# Patient Record
Sex: MALE | Race: WHITE | Employment: OTHER | URBAN - NONMETROPOLITAN AREA
[De-identification: names, ages, dates, MRNs, and addresses within clinical notes are randomized per-mention and may not be internally consistent; named-entity substitution may affect disease eponyms.]

---

## 2017-09-06 LAB
ALBUMIN SERPL-MCNC: 3.9 G/DL (ref 3.5–5.2)
ALP BLD-CCNC: 57 U/L (ref 40–130)
ALT SERPL-CCNC: 16 U/L (ref 5–41)
ANION GAP SERPL CALCULATED.3IONS-SCNC: 16 MMOL/L (ref 7–19)
AST SERPL-CCNC: 16 U/L (ref 5–40)
BASOPHILS ABSOLUTE: 0.1 K/UL (ref 0–0.2)
BASOPHILS RELATIVE PERCENT: 1 % (ref 0–1)
BILIRUB SERPL-MCNC: 0.7 MG/DL (ref 0.2–1.2)
BUN BLDV-MCNC: 16 MG/DL (ref 8–23)
CALCIUM SERPL-MCNC: 9 MG/DL (ref 8.8–10.2)
CHLORIDE BLD-SCNC: 102 MMOL/L (ref 98–111)
CHOLESTEROL, TOTAL: 195 MG/DL (ref 160–199)
CO2: 23 MMOL/L (ref 22–29)
CREAT SERPL-MCNC: 0.8 MG/DL (ref 0.5–1.2)
EOSINOPHILS ABSOLUTE: 0.1 K/UL (ref 0–0.6)
EOSINOPHILS RELATIVE PERCENT: 1.8 % (ref 0–5)
GFR NON-AFRICAN AMERICAN: >60
GLUCOSE BLD-MCNC: 103 MG/DL (ref 74–109)
HCT VFR BLD CALC: 44.2 % (ref 42–52)
HDLC SERPL-MCNC: 42 MG/DL (ref 55–121)
HEMOGLOBIN: 15 G/DL (ref 14–18)
LDL CHOLESTEROL CALCULATED: 122 MG/DL
LYMPHOCYTES ABSOLUTE: 1.7 K/UL (ref 1.1–4.5)
LYMPHOCYTES RELATIVE PERCENT: 34.1 % (ref 20–40)
MCH RBC QN AUTO: 32.2 PG (ref 27–31)
MCHC RBC AUTO-ENTMCNC: 33.9 G/DL (ref 33–37)
MCV RBC AUTO: 94.8 FL (ref 80–94)
MONOCYTES ABSOLUTE: 0.5 K/UL (ref 0–0.9)
MONOCYTES RELATIVE PERCENT: 10.8 % (ref 0–10)
NEUTROPHILS ABSOLUTE: 2.6 K/UL (ref 1.5–7.5)
NEUTROPHILS RELATIVE PERCENT: 51.9 % (ref 50–65)
PDW BLD-RTO: 13.4 % (ref 11.5–14.5)
PLATELET # BLD: 176 K/UL (ref 130–400)
PMV BLD AUTO: 11.2 FL (ref 9.4–12.4)
POTASSIUM SERPL-SCNC: 4 MMOL/L (ref 3.5–5)
PROSTATE SPECIFIC ANTIGEN: <0.01 NG/ML (ref 0–4)
RBC # BLD: 4.66 M/UL (ref 4.7–6.1)
SODIUM BLD-SCNC: 141 MMOL/L (ref 136–145)
TOTAL PROTEIN: 7.2 G/DL (ref 6.6–8.7)
TRIGL SERPL-MCNC: 153 MG/DL (ref 150–199)
WBC # BLD: 5 K/UL (ref 4.8–10.8)

## 2017-09-12 ENCOUNTER — OFFICE VISIT (OUTPATIENT)
Dept: FAMILY MEDICINE CLINIC | Age: 67
End: 2017-09-12
Payer: MEDICARE

## 2017-09-12 VITALS
SYSTOLIC BLOOD PRESSURE: 152 MMHG | RESPIRATION RATE: 18 BRPM | DIASTOLIC BLOOD PRESSURE: 84 MMHG | HEIGHT: 75 IN | OXYGEN SATURATION: 98 % | WEIGHT: 251.8 LBS | HEART RATE: 70 BPM | BODY MASS INDEX: 31.31 KG/M2

## 2017-09-12 DIAGNOSIS — E78.2 MIXED HYPERLIPIDEMIA: Primary | ICD-10-CM

## 2017-09-12 DIAGNOSIS — Z00.00 ADULT GENERAL MEDICAL EXAM: ICD-10-CM

## 2017-09-12 PROCEDURE — 99213 OFFICE O/P EST LOW 20 MIN: CPT | Performed by: CLINICAL NURSE SPECIALIST

## 2017-09-12 ASSESSMENT — ENCOUNTER SYMPTOMS
WHEEZING: 0
VOMITING: 0
DIARRHEA: 0
CONSTIPATION: 0
TROUBLE SWALLOWING: 0
EYE DISCHARGE: 0
ABDOMINAL PAIN: 0
SORE THROAT: 0
CHEST TIGHTNESS: 0
COUGH: 0
EYE PAIN: 0
SHORTNESS OF BREATH: 0
SINUS PRESSURE: 0
NAUSEA: 0
COLOR CHANGE: 0
BACK PAIN: 0

## 2017-09-12 ASSESSMENT — PATIENT HEALTH QUESTIONNAIRE - PHQ9
2. FEELING DOWN, DEPRESSED OR HOPELESS: 0
SUM OF ALL RESPONSES TO PHQ9 QUESTIONS 1 & 2: 0
SUM OF ALL RESPONSES TO PHQ QUESTIONS 1-9: 0
1. LITTLE INTEREST OR PLEASURE IN DOING THINGS: 0

## 2018-01-23 ENCOUNTER — APPOINTMENT (OUTPATIENT)
Dept: GENERAL RADIOLOGY | Facility: HOSPITAL | Age: 68
End: 2018-01-23
Attending: FAMILY MEDICINE

## 2018-01-23 PROCEDURE — 71046 X-RAY EXAM CHEST 2 VIEWS: CPT

## 2018-03-06 DIAGNOSIS — E78.2 MIXED HYPERLIPIDEMIA: ICD-10-CM

## 2018-03-06 LAB
ALBUMIN SERPL-MCNC: 4 G/DL (ref 3.5–5.2)
ALP BLD-CCNC: 55 U/L (ref 40–130)
ALT SERPL-CCNC: 15 U/L (ref 5–41)
ANION GAP SERPL CALCULATED.3IONS-SCNC: 16 MMOL/L (ref 7–19)
AST SERPL-CCNC: 17 U/L (ref 5–40)
BILIRUB SERPL-MCNC: 0.5 MG/DL (ref 0.2–1.2)
BUN BLDV-MCNC: 13 MG/DL (ref 8–23)
CALCIUM SERPL-MCNC: 9 MG/DL (ref 8.8–10.2)
CHLORIDE BLD-SCNC: 101 MMOL/L (ref 98–111)
CHOLESTEROL, TOTAL: 214 MG/DL (ref 160–199)
CO2: 24 MMOL/L (ref 22–29)
CREAT SERPL-MCNC: 0.9 MG/DL (ref 0.5–1.2)
GFR NON-AFRICAN AMERICAN: >60
GLUCOSE BLD-MCNC: 109 MG/DL (ref 74–109)
HCT VFR BLD CALC: 47 % (ref 42–52)
HDLC SERPL-MCNC: 47 MG/DL (ref 55–121)
HEMOGLOBIN: 15.8 G/DL (ref 14–18)
LDL CHOLESTEROL CALCULATED: 137 MG/DL
MCH RBC QN AUTO: 31.1 PG (ref 27–31)
MCHC RBC AUTO-ENTMCNC: 33.6 G/DL (ref 33–37)
MCV RBC AUTO: 92.5 FL (ref 80–94)
PDW BLD-RTO: 13.4 % (ref 11.5–14.5)
PLATELET # BLD: 188 K/UL (ref 130–400)
PMV BLD AUTO: 11.6 FL (ref 9.4–12.4)
POTASSIUM SERPL-SCNC: 4.2 MMOL/L (ref 3.5–5)
RBC # BLD: 5.08 M/UL (ref 4.7–6.1)
SODIUM BLD-SCNC: 141 MMOL/L (ref 136–145)
TOTAL PROTEIN: 7 G/DL (ref 6.6–8.7)
TRIGL SERPL-MCNC: 150 MG/DL (ref 0–149)
WBC # BLD: 5.6 K/UL (ref 4.8–10.8)

## 2018-03-13 ENCOUNTER — OFFICE VISIT (OUTPATIENT)
Dept: FAMILY MEDICINE CLINIC | Age: 68
End: 2018-03-13
Payer: MEDICARE

## 2018-03-13 VITALS
TEMPERATURE: 97.7 F | BODY MASS INDEX: 31.71 KG/M2 | DIASTOLIC BLOOD PRESSURE: 96 MMHG | HEART RATE: 82 BPM | SYSTOLIC BLOOD PRESSURE: 158 MMHG | OXYGEN SATURATION: 98 % | HEIGHT: 75 IN | WEIGHT: 255 LBS

## 2018-03-13 DIAGNOSIS — Z23 NEED FOR TDAP VACCINATION: ICD-10-CM

## 2018-03-13 DIAGNOSIS — I10 ESSENTIAL HYPERTENSION: ICD-10-CM

## 2018-03-13 DIAGNOSIS — E78.2 MIXED HYPERLIPIDEMIA: Primary | ICD-10-CM

## 2018-03-13 DIAGNOSIS — H61.21 IMPACTED CERUMEN OF RIGHT EAR: ICD-10-CM

## 2018-03-13 PROCEDURE — G8482 FLU IMMUNIZE ORDER/ADMIN: HCPCS | Performed by: CLINICAL NURSE SPECIALIST

## 2018-03-13 PROCEDURE — 90471 IMMUNIZATION ADMIN: CPT | Performed by: CLINICAL NURSE SPECIALIST

## 2018-03-13 PROCEDURE — 1036F TOBACCO NON-USER: CPT | Performed by: CLINICAL NURSE SPECIALIST

## 2018-03-13 PROCEDURE — G8427 DOCREV CUR MEDS BY ELIG CLIN: HCPCS | Performed by: CLINICAL NURSE SPECIALIST

## 2018-03-13 PROCEDURE — G8417 CALC BMI ABV UP PARAM F/U: HCPCS | Performed by: CLINICAL NURSE SPECIALIST

## 2018-03-13 PROCEDURE — 4040F PNEUMOC VAC/ADMIN/RCVD: CPT | Performed by: CLINICAL NURSE SPECIALIST

## 2018-03-13 PROCEDURE — 99214 OFFICE O/P EST MOD 30 MIN: CPT | Performed by: CLINICAL NURSE SPECIALIST

## 2018-03-13 PROCEDURE — 1123F ACP DISCUSS/DSCN MKR DOCD: CPT | Performed by: CLINICAL NURSE SPECIALIST

## 2018-03-13 PROCEDURE — 3017F COLORECTAL CA SCREEN DOC REV: CPT | Performed by: CLINICAL NURSE SPECIALIST

## 2018-03-13 PROCEDURE — 90715 TDAP VACCINE 7 YRS/> IM: CPT | Performed by: CLINICAL NURSE SPECIALIST

## 2018-03-13 RX ORDER — LISINOPRIL 10 MG/1
10 TABLET ORAL DAILY
Qty: 90 TABLET | Refills: 3 | Status: SHIPPED | OUTPATIENT
Start: 2018-03-13 | End: 2019-03-03 | Stop reason: SDUPTHER

## 2018-03-13 RX ORDER — ATORVASTATIN CALCIUM 20 MG/1
TABLET, FILM COATED ORAL
Qty: 90 TABLET | Refills: 3 | Status: SHIPPED | OUTPATIENT
Start: 2018-03-13 | End: 2019-03-20 | Stop reason: SDUPTHER

## 2018-03-13 ASSESSMENT — ENCOUNTER SYMPTOMS
TROUBLE SWALLOWING: 0
BACK PAIN: 0
EYE DISCHARGE: 0
WHEEZING: 0
ABDOMINAL PAIN: 0
SHORTNESS OF BREATH: 0
EYE PAIN: 0
CHEST TIGHTNESS: 0
SORE THROAT: 0
DIARRHEA: 0
COLOR CHANGE: 0
COUGH: 0
VOMITING: 0
SINUS PRESSURE: 0
CONSTIPATION: 0
NAUSEA: 0

## 2018-03-13 NOTE — PROGRESS NOTES
SUBJECTIVE:  Best Ahuja is a 75 y.o. who presents today for 6 Month Follow-Up      HPI  Mr Marco Lara presents today for 6 month follow up. He has been doing well since his last visit, no major illness or infection. His only complaint today is buzzing in his ears, worse when room is quiet. No drainage or decreased hearing noted. He has noted over the last 1-2 months. Mixed hyperlipidemia, he admits to not taking statin daily, may only take 3 days per week some weeks. He also admits to eating anything he wants, no real diet restrictions noted. LDL fasting 137  LFTs WNL    Essential hypertension, remains elevated on routine office visits. He reports elevation at dental appointments as well. No prior treatment for high blood pressure. No reports of syncope, headache, vision changes or acute neuro symptoms. No chest pain. We need his last colonoscopy, he is unsure of date or location  Needs updated Tdap    Past Medical History:   Diagnosis Date    Adult general medical exam     Elevated blood pressure reading     Erectile dysfunction     Mixed hyperlipidemia     Osteoarthritis     Prostate cancer ([facility] [address]      Past Surgical History:   Procedure Laterality Date    HIP SURGERY      KNEE SURGERY      PROSTATECTOMY       Family History   Problem Relation Age of Onset    Hypertension Mother     Coronary Art Dis Mother     Coronary Art Dis Father     Mult Sclerosis Sister      Social History   Substance Use Topics    Smoking status: Former Smoker     Years: 3.00    Smokeless tobacco: Former User     Types: Chew    Alcohol use Yes      Comment: social     Current Outpatient Prescriptions   Medication Sig Dispense Refill    lisinopril (PRINIVIL;ZESTRIL) 10 MG tablet Take 1 tablet by mouth daily 90 tablet 3    atorvastatin (LIPITOR) 20 MG tablet TAKE 1 TABLET AT BEDTIME FOR CHOLESTEROL 90 tablet 3     No current facility-administered medications for this visit.       No Known

## 2018-09-06 DIAGNOSIS — E78.2 MIXED HYPERLIPIDEMIA: ICD-10-CM

## 2018-09-06 DIAGNOSIS — I10 ESSENTIAL HYPERTENSION: ICD-10-CM

## 2018-09-06 LAB
ALBUMIN SERPL-MCNC: 4.2 G/DL (ref 3.5–5.2)
ALP BLD-CCNC: 59 U/L (ref 40–130)
ALT SERPL-CCNC: 20 U/L (ref 5–41)
ANION GAP SERPL CALCULATED.3IONS-SCNC: 16 MMOL/L (ref 7–19)
AST SERPL-CCNC: 19 U/L (ref 5–40)
BILIRUB SERPL-MCNC: 0.8 MG/DL (ref 0.2–1.2)
BUN BLDV-MCNC: 18 MG/DL (ref 8–23)
CALCIUM SERPL-MCNC: 9.2 MG/DL (ref 8.8–10.2)
CHLORIDE BLD-SCNC: 102 MMOL/L (ref 98–111)
CHOLESTEROL, TOTAL: 148 MG/DL (ref 160–199)
CO2: 24 MMOL/L (ref 22–29)
CREAT SERPL-MCNC: 0.9 MG/DL (ref 0.5–1.2)
GFR NON-AFRICAN AMERICAN: >60
GLUCOSE BLD-MCNC: 108 MG/DL (ref 74–109)
HCT VFR BLD CALC: 45.2 % (ref 42–52)
HDLC SERPL-MCNC: 44 MG/DL (ref 55–121)
HEMOGLOBIN: 14.9 G/DL (ref 14–18)
LDL CHOLESTEROL CALCULATED: 80 MG/DL
MCH RBC QN AUTO: 31.9 PG (ref 27–31)
MCHC RBC AUTO-ENTMCNC: 33 G/DL (ref 33–37)
MCV RBC AUTO: 96.8 FL (ref 80–94)
PDW BLD-RTO: 13.7 % (ref 11.5–14.5)
PLATELET # BLD: 186 K/UL (ref 130–400)
PMV BLD AUTO: 11.8 FL (ref 9.4–12.4)
POTASSIUM SERPL-SCNC: 3.9 MMOL/L (ref 3.5–5)
RBC # BLD: 4.67 M/UL (ref 4.7–6.1)
SODIUM BLD-SCNC: 142 MMOL/L (ref 136–145)
TOTAL PROTEIN: 7.1 G/DL (ref 6.6–8.7)
TRIGL SERPL-MCNC: 122 MG/DL (ref 0–149)
WBC # BLD: 6.1 K/UL (ref 4.8–10.8)

## 2018-09-13 ENCOUNTER — OFFICE VISIT (OUTPATIENT)
Dept: FAMILY MEDICINE CLINIC | Age: 68
End: 2018-09-13
Payer: MEDICARE

## 2018-09-13 VITALS
HEIGHT: 75 IN | HEART RATE: 74 BPM | WEIGHT: 251 LBS | BODY MASS INDEX: 31.21 KG/M2 | OXYGEN SATURATION: 98 % | TEMPERATURE: 98.3 F | SYSTOLIC BLOOD PRESSURE: 122 MMHG | DIASTOLIC BLOOD PRESSURE: 64 MMHG

## 2018-09-13 DIAGNOSIS — Z12.5 ENCOUNTER FOR SCREENING FOR MALIGNANT NEOPLASM OF PROSTATE: ICD-10-CM

## 2018-09-13 DIAGNOSIS — E78.2 MIXED HYPERLIPIDEMIA: ICD-10-CM

## 2018-09-13 DIAGNOSIS — Z23 NEED FOR INFLUENZA VACCINATION: ICD-10-CM

## 2018-09-13 DIAGNOSIS — C61 PROSTATE CANCER (HCC): ICD-10-CM

## 2018-09-13 DIAGNOSIS — Z00.00 ROUTINE GENERAL MEDICAL EXAMINATION AT A HEALTH CARE FACILITY: Primary | ICD-10-CM

## 2018-09-13 DIAGNOSIS — Z13.6 SCREENING FOR AAA (ABDOMINAL AORTIC ANEURYSM): ICD-10-CM

## 2018-09-13 DIAGNOSIS — I10 ESSENTIAL HYPERTENSION: ICD-10-CM

## 2018-09-13 PROCEDURE — G0438 PPPS, INITIAL VISIT: HCPCS | Performed by: CLINICAL NURSE SPECIALIST

## 2018-09-13 PROCEDURE — G0008 ADMIN INFLUENZA VIRUS VAC: HCPCS | Performed by: CLINICAL NURSE SPECIALIST

## 2018-09-13 PROCEDURE — 90662 IIV NO PRSV INCREASED AG IM: CPT | Performed by: CLINICAL NURSE SPECIALIST

## 2018-09-13 PROCEDURE — 4040F PNEUMOC VAC/ADMIN/RCVD: CPT | Performed by: CLINICAL NURSE SPECIALIST

## 2018-09-13 ASSESSMENT — ENCOUNTER SYMPTOMS
ABDOMINAL PAIN: 0
COLOR CHANGE: 0
EYE PAIN: 0
CHEST TIGHTNESS: 0
DIARRHEA: 0
VOMITING: 0
SHORTNESS OF BREATH: 0
BACK PAIN: 0
SORE THROAT: 0
NAUSEA: 0
WHEEZING: 0
SINUS PRESSURE: 0
EYE DISCHARGE: 0
TROUBLE SWALLOWING: 0
CONSTIPATION: 0
COUGH: 0

## 2018-09-13 ASSESSMENT — LIFESTYLE VARIABLES
HOW OFTEN DO YOU HAVE A DRINK CONTAINING ALCOHOL: 2
AUDIT-C TOTAL SCORE: 5
AUDIT TOTAL SCORE: 5
HOW OFTEN DURING THE LAST YEAR HAVE YOU FOUND THAT YOU WERE NOT ABLE TO STOP DRINKING ONCE YOU HAD STARTED: 0
HOW OFTEN DO YOU HAVE SIX OR MORE DRINKS ON ONE OCCASION: 2
HAS A RELATIVE, FRIEND, DOCTOR, OR ANOTHER HEALTH PROFESSIONAL EXPRESSED CONCERN ABOUT YOUR DRINKING OR SUGGESTED YOU CUT DOWN: 0
HOW OFTEN DURING THE LAST YEAR HAVE YOU FAILED TO DO WHAT WAS NORMALLY EXPECTED FROM YOU BECAUSE OF DRINKING: 0
HOW OFTEN DURING THE LAST YEAR HAVE YOU HAD A FEELING OF GUILT OR REMORSE AFTER DRINKING: 0
HOW OFTEN DURING THE LAST YEAR HAVE YOU BEEN UNABLE TO REMEMBER WHAT HAPPENED THE NIGHT BEFORE BECAUSE YOU HAD BEEN DRINKING: 0
HOW MANY STANDARD DRINKS CONTAINING ALCOHOL DO YOU HAVE ON A TYPICAL DAY: 1
HAVE YOU OR SOMEONE ELSE BEEN INJURED AS A RESULT OF YOUR DRINKING: 0
HOW OFTEN DURING THE LAST YEAR HAVE YOU NEEDED AN ALCOHOLIC DRINK FIRST THING IN THE MORNING TO GET YOURSELF GOING AFTER A NIGHT OF HEAVY DRINKING: 0

## 2018-09-13 ASSESSMENT — ANXIETY QUESTIONNAIRES: GAD7 TOTAL SCORE: 0

## 2018-09-13 ASSESSMENT — PATIENT HEALTH QUESTIONNAIRE - PHQ9
SUM OF ALL RESPONSES TO PHQ QUESTIONS 1-9: 0
SUM OF ALL RESPONSES TO PHQ QUESTIONS 1-9: 0

## 2018-09-13 NOTE — PROGRESS NOTES
Vaccine Information Sheet, \"Influenza - Inactivated\" OR \"Live - Intranasal\"  given to Thea Pallas, or parent/legal guardian of  Thea Pallas and verbalized understanding. Patient responses:    Have you ever had a reaction to a flu vaccine? No  Are you able to eat eggs without adverse effects? Yes  Do you have any current illness? No  Have you ever had Guillian [place] Syndrome? No    Flu vaccine given per order. Please see immunization tab.
INFLUENZA, HIGH DOSE, 65 YRS +, IM, PF, PREFILL SYR, 0.5ML (FLUZONE HD)    4. Mixed hyperlipidemia  Controlled, continue statin therapy daily  - Comprehensive Metabolic Panel; Future  - Lipid Panel; Future    5. Essential hypertension  Controlled, continue lisinopril   - CBC; Future  - Comprehensive Metabolic Panel; Future  - Urinalysis with Microscopic; Future    6. Prostate cancer [facility])  Post prostatectomy  - Psa screening; Future  - Urinalysis with Microscopic; Future    7. Encounter for screening for malignant neoplasm of prostate  - Psa screening; Future         Return in 6 months (on 3/13/2019) for Labs one week prior. Douglas Johnson

## 2018-09-13 NOTE — PATIENT INSTRUCTIONS
Personalized Preventive Plan for Kailash Flores - 9/13/2018  Medicare offers a range of preventive health benefits. Some of the tests and screenings are paid in full while other may be subject to a deductible, co-insurance, and/or copay. Some of these benefits include a comprehensive review of your medical history including lifestyle, illnesses that may run in your family, and various assessments and screenings as appropriate. After reviewing your medical record and screening and assessments performed today your provider may have ordered immunizations, labs, imaging, and/or referrals for you. A list of these orders (if applicable) as well as your Preventive Care list are included within your After Visit Summary for your review. Other Preventive Recommendations:    · A preventive eye exam performed by an eye specialist is recommended every 1-2 years to screen for glaucoma; cataracts, macular degeneration, and other eye disorders. · A preventive dental visit is recommended every 6 months. · Try to get at least 150 minutes of exercise per week or 10,000 steps per day on a pedometer . · Order or download the FREE \"Exercise & Physical Activity: Your Everyday Guide\" from [employer]. Call 9-918.424.4714 or search [employer] online. · You need 0340-8567 mg of calcium and 9927-6504 IU of vitamin D per day. It is possible to meet your calcium requirement with diet alone, but a vitamin D supplement is usually necessary to meet this goal.  · When exposed to the sun, use a sunscreen that protects against both UVA and UVB radiation with an SPF of 30 or greater. Reapply every 2 to 3 hours or after sweating, drying off with a towel, or swimming. · Always wear a seat belt when traveling in a car. Always wear a helmet when riding a bicycle or motorcycle.

## 2019-03-04 DIAGNOSIS — I10 ESSENTIAL HYPERTENSION: ICD-10-CM

## 2019-03-04 RX ORDER — LISINOPRIL 10 MG/1
TABLET ORAL
Qty: 90 TABLET | Refills: 1 | Status: SHIPPED | OUTPATIENT
Start: 2019-03-04 | End: 2019-08-30 | Stop reason: SDUPTHER

## 2019-03-06 DIAGNOSIS — C61 PROSTATE CANCER (HCC): ICD-10-CM

## 2019-03-06 DIAGNOSIS — Z12.5 ENCOUNTER FOR SCREENING FOR MALIGNANT NEOPLASM OF PROSTATE: ICD-10-CM

## 2019-03-06 DIAGNOSIS — I10 ESSENTIAL HYPERTENSION: ICD-10-CM

## 2019-03-06 DIAGNOSIS — Z00.00 ROUTINE GENERAL MEDICAL EXAMINATION AT A HEALTH CARE FACILITY: ICD-10-CM

## 2019-03-06 DIAGNOSIS — E78.2 MIXED HYPERLIPIDEMIA: ICD-10-CM

## 2019-03-06 LAB
ALBUMIN SERPL-MCNC: 4.3 G/DL (ref 3.5–5.2)
ALP BLD-CCNC: 70 U/L (ref 40–130)
ALT SERPL-CCNC: 23 U/L (ref 5–41)
ANION GAP SERPL CALCULATED.3IONS-SCNC: 16 MMOL/L (ref 7–19)
AST SERPL-CCNC: 18 U/L (ref 5–40)
BACTERIA: NEGATIVE /HPF
BILIRUB SERPL-MCNC: 0.7 MG/DL (ref 0.2–1.2)
BILIRUBIN URINE: NEGATIVE
BLOOD, URINE: NEGATIVE
BUN BLDV-MCNC: 18 MG/DL (ref 8–23)
CALCIUM SERPL-MCNC: 9.3 MG/DL (ref 8.8–10.2)
CHLORIDE BLD-SCNC: 101 MMOL/L (ref 98–111)
CHOLESTEROL, TOTAL: 133 MG/DL (ref 160–199)
CLARITY: CLEAR
CO2: 27 MMOL/L (ref 22–29)
COLOR: YELLOW
CREAT SERPL-MCNC: 1 MG/DL (ref 0.5–1.2)
EPITHELIAL CELLS, UA: 0 /HPF (ref 0–5)
GFR NON-AFRICAN AMERICAN: >60
GLUCOSE BLD-MCNC: 110 MG/DL (ref 74–109)
GLUCOSE URINE: NEGATIVE MG/DL
HCT VFR BLD CALC: 48.1 % (ref 42–52)
HDLC SERPL-MCNC: 44 MG/DL (ref 55–121)
HEMOGLOBIN: 15.6 G/DL (ref 14–18)
HYALINE CASTS: 1 /HPF (ref 0–8)
KETONES, URINE: NEGATIVE MG/DL
LDL CHOLESTEROL CALCULATED: 68 MG/DL
LEUKOCYTE ESTERASE, URINE: NEGATIVE
MCH RBC QN AUTO: 30.9 PG (ref 27–31)
MCHC RBC AUTO-ENTMCNC: 32.4 G/DL (ref 33–37)
MCV RBC AUTO: 95.2 FL (ref 80–94)
NITRITE, URINE: NEGATIVE
PDW BLD-RTO: 13.8 % (ref 11.5–14.5)
PH UA: 6 (ref 5–8)
PLATELET # BLD: 206 K/UL (ref 130–400)
PMV BLD AUTO: 11.3 FL (ref 9.4–12.4)
POTASSIUM SERPL-SCNC: 4.4 MMOL/L (ref 3.5–5)
PROSTATE SPECIFIC ANTIGEN: 0.01 NG/ML (ref 0–4)
PROTEIN UA: NEGATIVE MG/DL
RBC # BLD: 5.05 M/UL (ref 4.7–6.1)
RBC UA: 0 /HPF (ref 0–4)
SODIUM BLD-SCNC: 144 MMOL/L (ref 136–145)
SPECIFIC GRAVITY UA: 1.02 (ref 1–1.03)
TOTAL PROTEIN: 7.6 G/DL (ref 6.6–8.7)
TRIGL SERPL-MCNC: 103 MG/DL (ref 0–149)
UROBILINOGEN, URINE: 0.2 E.U./DL
WBC # BLD: 6.5 K/UL (ref 4.8–10.8)
WBC UA: 0 /HPF (ref 0–5)

## 2019-03-13 ENCOUNTER — OFFICE VISIT (OUTPATIENT)
Dept: FAMILY MEDICINE CLINIC | Age: 69
End: 2019-03-13
Payer: MEDICARE

## 2019-03-13 VITALS
TEMPERATURE: 97.7 F | WEIGHT: 249.4 LBS | HEART RATE: 91 BPM | DIASTOLIC BLOOD PRESSURE: 74 MMHG | BODY MASS INDEX: 31.17 KG/M2 | SYSTOLIC BLOOD PRESSURE: 130 MMHG | OXYGEN SATURATION: 98 %

## 2019-03-13 DIAGNOSIS — Z23 NEED FOR SHINGLES VACCINE: ICD-10-CM

## 2019-03-13 DIAGNOSIS — C61 PROSTATE CANCER (HCC): ICD-10-CM

## 2019-03-13 DIAGNOSIS — I10 ESSENTIAL HYPERTENSION: ICD-10-CM

## 2019-03-13 DIAGNOSIS — E78.2 MIXED HYPERLIPIDEMIA: Primary | ICD-10-CM

## 2019-03-13 PROCEDURE — 1123F ACP DISCUSS/DSCN MKR DOCD: CPT | Performed by: CLINICAL NURSE SPECIALIST

## 2019-03-13 PROCEDURE — 99214 OFFICE O/P EST MOD 30 MIN: CPT | Performed by: CLINICAL NURSE SPECIALIST

## 2019-03-13 PROCEDURE — 4040F PNEUMOC VAC/ADMIN/RCVD: CPT | Performed by: CLINICAL NURSE SPECIALIST

## 2019-03-13 PROCEDURE — G8417 CALC BMI ABV UP PARAM F/U: HCPCS | Performed by: CLINICAL NURSE SPECIALIST

## 2019-03-13 PROCEDURE — 3017F COLORECTAL CA SCREEN DOC REV: CPT | Performed by: CLINICAL NURSE SPECIALIST

## 2019-03-13 PROCEDURE — 1036F TOBACCO NON-USER: CPT | Performed by: CLINICAL NURSE SPECIALIST

## 2019-03-13 PROCEDURE — 1101F PT FALLS ASSESS-DOCD LE1/YR: CPT | Performed by: CLINICAL NURSE SPECIALIST

## 2019-03-13 PROCEDURE — G8427 DOCREV CUR MEDS BY ELIG CLIN: HCPCS | Performed by: CLINICAL NURSE SPECIALIST

## 2019-03-13 PROCEDURE — G8482 FLU IMMUNIZE ORDER/ADMIN: HCPCS | Performed by: CLINICAL NURSE SPECIALIST

## 2019-03-13 ASSESSMENT — ENCOUNTER SYMPTOMS
COUGH: 0
SINUS PRESSURE: 0
DIARRHEA: 0
COLOR CHANGE: 0
TROUBLE SWALLOWING: 0
VOMITING: 0
SHORTNESS OF BREATH: 0
EYE DISCHARGE: 0
EYE PAIN: 0
CHEST TIGHTNESS: 0
ABDOMINAL PAIN: 0
CONSTIPATION: 0
BACK PAIN: 0
NAUSEA: 0
WHEEZING: 0
SORE THROAT: 0

## 2019-03-13 ASSESSMENT — PATIENT HEALTH QUESTIONNAIRE - PHQ9
SUM OF ALL RESPONSES TO PHQ QUESTIONS 1-9: 0
SUM OF ALL RESPONSES TO PHQ9 QUESTIONS 1 & 2: 0
1. LITTLE INTEREST OR PLEASURE IN DOING THINGS: 0
2. FEELING DOWN, DEPRESSED OR HOPELESS: 0
SUM OF ALL RESPONSES TO PHQ QUESTIONS 1-9: 0

## 2019-03-20 DIAGNOSIS — E78.2 MIXED HYPERLIPIDEMIA: ICD-10-CM

## 2019-03-20 RX ORDER — ATORVASTATIN CALCIUM 20 MG/1
TABLET, FILM COATED ORAL
Qty: 90 TABLET | Refills: 3 | Status: SHIPPED | OUTPATIENT
Start: 2019-03-20 | End: 2020-03-16

## 2019-03-22 ENCOUNTER — HOSPITAL ENCOUNTER (OUTPATIENT)
Dept: ULTRASOUND IMAGING | Age: 69
Discharge: HOME | End: 2019-03-22
Payer: MEDICARE

## 2019-03-22 DIAGNOSIS — Z13.6 SCREENING FOR AAA (ABDOMINAL AORTIC ANEURYSM): ICD-10-CM

## 2019-03-22 PROCEDURE — 76706 US ABDL AORTA SCREEN AAA: CPT

## 2019-06-10 RX ORDER — CHLORAL HYDRATE 500 MG
CAPSULE ORAL
COMMUNITY

## 2019-06-10 RX ORDER — NAPROXEN SODIUM 220 MG
220 TABLET ORAL 2 TIMES DAILY PRN
COMMUNITY

## 2019-07-15 NOTE — PROGRESS NOTES
Chief Complaint   Patient presents with   • Colon Cancer Screening     colon-8/6/14 diverticulosis       PCP: Aida Stubbs APRN  REFER: No ref. provider found    Subjective     HPI    Dereje Leone is a 69 y.o. male who presents to office for preventative maintenance.  There is not  a personal history of colon polyps.  There is not a history of colon cancer.  He does not have complaints of nausea/vomiting, change in bowels, weight loss, no BRBPR, no melena.  There is not a family history of colon cancer.  There is not a family history of colon polyps.  He last colonoscopy-2014 .  Bowels do move on regular basis.    CScope (Dr Wong) 2009 & 2014- normal     Past Medical History:   Diagnosis Date   • Cancer (CMS/HCC)     prostate     Past Surgical History:   Procedure Laterality Date   • COLONOSCOPY  08/06/2014    diverticosis   • ENDOSCOPY  07/26/2004    esophagitis  gastritis   • HERNIA REPAIR     • HIP SURGERY     • PROSTATE SURGERY     • REPLACEMENT TOTAL KNEE       Outpatient Medications Marked as Taking for the 7/16/19 encounter (Office Visit) with David Thacker APRN   Medication Sig Dispense Refill   • atorvastatin (LIPITOR) 20 MG tablet      • lisinopril (PRINIVIL,ZESTRIL) 10 MG tablet TAKE 1 TABLET DAILY     • naproxen sodium (ALEVE) 220 MG tablet Take 220 mg by mouth 2 (Two) Times a Day As Needed.       No Known Allergies  Social History     Socioeconomic History   • Marital status:      Spouse name: Not on file   • Number of children: Not on file   • Years of education: Not on file   • Highest education level: Not on file   Tobacco Use   • Smoking status: Former Smoker   • Smokeless tobacco: Never Used   Substance and Sexual Activity   • Alcohol use: Yes     Comment: occ   • Drug use: No   • Sexual activity: Defer     Family History   Problem Relation Age of Onset   • Esophageal cancer Mother      Review of Systems   Constitutional: Negative for fatigue, fever and unexpected  weight change.   HENT: Negative for hearing loss, sore throat and voice change.    Eyes: Negative for visual disturbance.   Respiratory: Negative for cough, shortness of breath and wheezing.    Cardiovascular: Negative for chest pain and palpitations.   Gastrointestinal: Negative for abdominal pain, blood in stool and vomiting.   Endocrine: Negative for polydipsia and polyuria.   Genitourinary: Negative for difficulty urinating, dysuria, hematuria and urgency.   Musculoskeletal: Negative for joint swelling and myalgias.   Skin: Negative for color change, rash and wound.   Neurological: Negative for dizziness, tremors, seizures and syncope.   Hematological: Does not bruise/bleed easily.   Psychiatric/Behavioral: Negative for agitation and confusion. The patient is not nervous/anxious.      Objective   Vitals:    07/16/19 1033   BP: 125/74   Pulse: 68   Temp: 95.4 °F (35.2 °C)   SpO2: 98%     Physical Exam   Constitutional: He is oriented to person, place, and time. He appears well-developed and well-nourished. He is cooperative.   HENT:   Head: Normocephalic and atraumatic.   Eyes: Conjunctivae are normal. Pupils are equal, round, and reactive to light. No scleral icterus.   Neck: Normal range of motion. Neck supple. No JVD present. No thyroid mass and no thyromegaly present.   Cardiovascular: Normal rate, regular rhythm and normal heart sounds. Exam reveals no gallop and no friction rub.   No murmur heard.  Pulmonary/Chest: Effort normal and breath sounds normal. No accessory muscle usage. No respiratory distress. He has no wheezes. He has no rales.   Abdominal: Soft. Normal appearance and bowel sounds are normal. He exhibits no distension, no ascites and no mass. There is no hepatosplenomegaly. There is no tenderness. There is no rebound and no guarding.   Musculoskeletal: Normal range of motion. He exhibits no edema or tenderness.     Vascular Status -  His right foot exhibits normal foot vasculature  and no  edema. His left foot exhibits normal foot vasculature  and no edema.  Lymphadenopathy:     He has no cervical adenopathy.   Neurological: He is alert and oriented to person, place, and time. He has normal strength. Gait normal.   Skin: Skin is warm, dry and intact. No rash noted.     Imaging Results (most recent)     None        Body mass index is 31 kg/m².    Assessment/Plan   Dereje was seen today for colon cancer screening.    Diagnoses and all orders for this visit:    Encounter for screening for malignant neoplasm of colon  -     Case Request; Standing  -     Implement Anesthesia Orders Day of Procedure; Standing  -     Obtain Informed Consent; Standing  -     Case Request      COLONOSCOPY WITH ANESTHESIA (N/A)  Educated on miralax prep    Last colon[facility] will only cover one screening colonoscopy every 10 years, this explained to patient, he verbalized understanding     All risks, benefits, alternatives, and indications of colonoscopy procedure have been discussed with the patient. Risks to include perforation of the colon requiring possible surgery or colostomy, risk of bleeding from biopsies or removal of colon tissue, possibility of missing a colon polyp or cancer, or adverse drug reaction.  Benefits to include the diagnosis and management of disease of the colon and rectum. Alternatives to include barium enema, radiographic evaluation, lab testing or no intervention. He verbalizes understanding and agrees.     Patient's Body mass index is 31 kg/m². BMI is above normal parameters. Recommendations include: no follow up.      There are no Patient Instructions on file for this visit.

## 2019-07-16 ENCOUNTER — OFFICE VISIT (OUTPATIENT)
Dept: GASTROENTEROLOGY | Facility: CLINIC | Age: 69
End: 2019-07-16

## 2019-07-16 VITALS
WEIGHT: 248 LBS | HEIGHT: 75 IN | HEART RATE: 68 BPM | DIASTOLIC BLOOD PRESSURE: 74 MMHG | SYSTOLIC BLOOD PRESSURE: 125 MMHG | BODY MASS INDEX: 30.84 KG/M2 | TEMPERATURE: 95.4 F | OXYGEN SATURATION: 98 %

## 2019-07-16 DIAGNOSIS — Z12.11 ENCOUNTER FOR SCREENING FOR MALIGNANT NEOPLASM OF COLON: Primary | ICD-10-CM

## 2019-07-16 PROCEDURE — S0260 H&P FOR SURGERY: HCPCS | Performed by: NURSE PRACTITIONER

## 2019-07-16 RX ORDER — ATORVASTATIN CALCIUM 20 MG/1
TABLET, FILM COATED ORAL
COMMUNITY
Start: 2019-06-19

## 2019-07-16 RX ORDER — LISINOPRIL 10 MG/1
TABLET ORAL
COMMUNITY
Start: 2019-03-04

## 2019-07-17 PROBLEM — Z12.11 ENCOUNTER FOR SCREENING FOR MALIGNANT NEOPLASM OF COLON: Status: ACTIVE | Noted: 2019-07-17

## 2019-08-06 ENCOUNTER — TELEPHONE (OUTPATIENT)
Dept: GASTROENTEROLOGY | Facility: CLINIC | Age: 69
End: 2019-08-06

## 2019-08-06 ENCOUNTER — ANESTHESIA (OUTPATIENT)
Dept: GASTROENTEROLOGY | Facility: HOSPITAL | Age: 69
End: 2019-08-06

## 2019-08-06 ENCOUNTER — ANESTHESIA EVENT (OUTPATIENT)
Dept: GASTROENTEROLOGY | Facility: HOSPITAL | Age: 69
End: 2019-08-06

## 2019-08-06 ENCOUNTER — HOSPITAL ENCOUNTER (OUTPATIENT)
Facility: HOSPITAL | Age: 69
Setting detail: HOSPITAL OUTPATIENT SURGERY
Discharge: HOME | End: 2019-08-06
Attending: INTERNAL MEDICINE | Admitting: INTERNAL MEDICINE

## 2019-08-06 VITALS
WEIGHT: 240 LBS | RESPIRATION RATE: 21 BRPM | BODY MASS INDEX: 29.84 KG/M2 | DIASTOLIC BLOOD PRESSURE: 83 MMHG | OXYGEN SATURATION: 96 % | SYSTOLIC BLOOD PRESSURE: 146 MMHG | HEART RATE: 69 BPM | HEIGHT: 75 IN | TEMPERATURE: 97.4 F

## 2019-08-06 DIAGNOSIS — Z12.11 ENCOUNTER FOR SCREENING FOR MALIGNANT NEOPLASM OF COLON: ICD-10-CM

## 2019-08-06 PROCEDURE — G0121 COLON CA SCRN NOT HI RSK IND: HCPCS | Performed by: INTERNAL MEDICINE

## 2019-08-06 PROCEDURE — 25010000002 PROPOFOL 10 MG/ML EMULSION: Performed by: NURSE ANESTHETIST, CERTIFIED REGISTERED

## 2019-08-06 RX ORDER — SODIUM CHLORIDE 0.9 % (FLUSH) 0.9 %
3 SYRINGE (ML) INJECTION AS NEEDED
Status: DISCONTINUED | OUTPATIENT
Start: 2019-08-06 | End: 2019-08-06 | Stop reason: HOSPADM

## 2019-08-06 RX ORDER — SODIUM CHLORIDE 9 MG/ML
500 INJECTION, SOLUTION INTRAVENOUS CONTINUOUS PRN
Status: DISCONTINUED | OUTPATIENT
Start: 2019-08-06 | End: 2019-08-06 | Stop reason: HOSPADM

## 2019-08-06 RX ORDER — PROPOFOL 10 MG/ML
VIAL (ML) INTRAVENOUS AS NEEDED
Status: DISCONTINUED | OUTPATIENT
Start: 2019-08-06 | End: 2019-08-06 | Stop reason: SURG

## 2019-08-06 RX ORDER — LIDOCAINE HYDROCHLORIDE 20 MG/ML
INJECTION, SOLUTION INFILTRATION; PERINEURAL AS NEEDED
Status: DISCONTINUED | OUTPATIENT
Start: 2019-08-06 | End: 2019-08-06 | Stop reason: SURG

## 2019-08-06 RX ADMIN — PROPOFOL 50 MG: 10 INJECTION, EMULSION INTRAVENOUS at 10:09

## 2019-08-06 RX ADMIN — PROPOFOL 50 MG: 10 INJECTION, EMULSION INTRAVENOUS at 10:05

## 2019-08-06 RX ADMIN — PROPOFOL 50 MG: 10 INJECTION, EMULSION INTRAVENOUS at 10:12

## 2019-08-06 RX ADMIN — SODIUM CHLORIDE 500 ML: 0.9 INJECTION, SOLUTION INTRAVENOUS at 09:03

## 2019-08-06 RX ADMIN — PROPOFOL 50 MG: 10 INJECTION, EMULSION INTRAVENOUS at 10:04

## 2019-08-06 RX ADMIN — PROPOFOL 50 MG: 10 INJECTION, EMULSION INTRAVENOUS at 10:07

## 2019-08-06 RX ADMIN — LIDOCAINE HYDROCHLORIDE 40 MG: 20 INJECTION, SOLUTION INFILTRATION; PERINEURAL at 10:04

## 2019-08-06 NOTE — ANESTHESIA POSTPROCEDURE EVALUATION
Patient: Dereje Leone    Procedure Summary     Date:  08/06/19 Room / Location:  [facility] ENDOSCOPY 4 / [facility] ENDOSCOPY    Anesthesia Start:  1002 Anesthesia Stop:  1016    Procedure:  COLONOSCOPY WITH ANESTHESIA (N/A ) Diagnosis:       Encounter for screening for malignant neoplasm of colon      (Encounter for screening for malignant neoplasm of colon [Z12.11])    Surgeon:  Cornelius Wong DO Provider:  Johnny Granados CRNA    Anesthesia Type:  MAC ASA Status:  2          Anesthesia Type: MAC  Last vitals  BP   146/83 (08/06/19 1017)   Temp   97.4 °F (36.3 °C) (08/06/19 0847)   Pulse   69 (08/06/19 1017)   Resp   21 (08/06/19 1030)     SpO2   96 % (08/06/19 1017)     Post Anesthesia Care and Evaluation    Patient location during evaluation: PHASE II  Patient participation: complete - patient participated  Level of consciousness: awake  Pain score: 0  Pain management: adequate  Airway patency: patent  Anesthetic complications: No anesthetic complications  PONV Status: none  Cardiovascular status: acceptable  Respiratory status: acceptable  Hydration status: acceptable  No anesthesia care post op

## 2019-08-06 NOTE — ANESTHESIA PREPROCEDURE EVALUATION
Anesthesia Evaluation     Patient summary reviewed and Nursing notes reviewed   NPO Solid Status: > 8 hours  NPO Liquid Status: > 2 hours           Airway   Mallampati: II  TM distance: >3 FB  Neck ROM: full  No difficulty expected  Dental      Pulmonary    Cardiovascular   Exercise tolerance: good (4-7 METS)    (+) hypertension, hyperlipidemia,       Neuro/Psych  (-) seizures, TIA, CVA  GI/Hepatic/Renal/Endo    (+) obesity,     (-) liver disease, no renal disease, diabetes    Musculoskeletal     Abdominal    Substance History      OB/GYN          Other      history of cancer (prostate cancer in remission) remission                    Anesthesia Plan    ASA 2     MAC     intravenous induction   Anesthetic plan, all risks, benefits, and alternatives have been provided, discussed and informed consent has been obtained with: patient.

## 2019-08-31 DIAGNOSIS — I10 ESSENTIAL HYPERTENSION: ICD-10-CM

## 2019-09-03 RX ORDER — LISINOPRIL 10 MG/1
TABLET ORAL
Qty: 90 TABLET | Refills: 4 | Status: SHIPPED | OUTPATIENT
Start: 2019-09-03 | End: 2020-11-30

## 2019-10-02 DIAGNOSIS — I10 ESSENTIAL HYPERTENSION: ICD-10-CM

## 2019-10-02 DIAGNOSIS — E78.2 MIXED HYPERLIPIDEMIA: ICD-10-CM

## 2019-10-02 LAB
ALBUMIN SERPL-MCNC: 4.3 G/DL (ref 3.5–5.2)
ALP BLD-CCNC: 70 U/L (ref 40–130)
ALT SERPL-CCNC: 24 U/L (ref 5–41)
ANION GAP SERPL CALCULATED.3IONS-SCNC: 17 MMOL/L (ref 7–19)
AST SERPL-CCNC: 18 U/L (ref 5–40)
BILIRUB SERPL-MCNC: 0.8 MG/DL (ref 0.2–1.2)
BUN BLDV-MCNC: 18 MG/DL (ref 8–23)
CALCIUM SERPL-MCNC: 9.2 MG/DL (ref 8.8–10.2)
CHLORIDE BLD-SCNC: 104 MMOL/L (ref 98–111)
CO2: 22 MMOL/L (ref 22–29)
CREAT SERPL-MCNC: 1 MG/DL (ref 0.5–1.2)
GFR NON-AFRICAN AMERICAN: >60
GLUCOSE BLD-MCNC: 110 MG/DL (ref 74–109)
POTASSIUM SERPL-SCNC: 4.3 MMOL/L (ref 3.5–5)
SODIUM BLD-SCNC: 143 MMOL/L (ref 136–145)
TOTAL PROTEIN: 7.5 G/DL (ref 6.6–8.7)

## 2019-10-09 ENCOUNTER — OFFICE VISIT (OUTPATIENT)
Dept: FAMILY MEDICINE CLINIC | Age: 69
End: 2019-10-09
Payer: MEDICARE

## 2019-10-09 VITALS
OXYGEN SATURATION: 97 % | SYSTOLIC BLOOD PRESSURE: 136 MMHG | TEMPERATURE: 97.7 F | HEART RATE: 79 BPM | DIASTOLIC BLOOD PRESSURE: 70 MMHG | WEIGHT: 249.6 LBS | BODY MASS INDEX: 31.04 KG/M2 | HEIGHT: 75 IN

## 2019-10-09 DIAGNOSIS — I10 ESSENTIAL HYPERTENSION: ICD-10-CM

## 2019-10-09 DIAGNOSIS — Z00.00 ROUTINE GENERAL MEDICAL EXAMINATION AT A HEALTH CARE FACILITY: Primary | ICD-10-CM

## 2019-10-09 DIAGNOSIS — Z23 NEED FOR INFLUENZA VACCINATION: ICD-10-CM

## 2019-10-09 DIAGNOSIS — Z12.5 ENCOUNTER FOR SCREENING FOR MALIGNANT NEOPLASM OF PROSTATE: ICD-10-CM

## 2019-10-09 DIAGNOSIS — E78.2 MIXED HYPERLIPIDEMIA: ICD-10-CM

## 2019-10-09 DIAGNOSIS — Z72.89 OTHER PROBLEMS RELATED TO LIFESTYLE: ICD-10-CM

## 2019-10-09 PROCEDURE — G8482 FLU IMMUNIZE ORDER/ADMIN: HCPCS | Performed by: CLINICAL NURSE SPECIALIST

## 2019-10-09 PROCEDURE — 3017F COLORECTAL CA SCREEN DOC REV: CPT | Performed by: CLINICAL NURSE SPECIALIST

## 2019-10-09 PROCEDURE — G8417 CALC BMI ABV UP PARAM F/U: HCPCS | Performed by: CLINICAL NURSE SPECIALIST

## 2019-10-09 PROCEDURE — G0439 PPPS, SUBSEQ VISIT: HCPCS | Performed by: CLINICAL NURSE SPECIALIST

## 2019-10-09 PROCEDURE — 1123F ACP DISCUSS/DSCN MKR DOCD: CPT | Performed by: CLINICAL NURSE SPECIALIST

## 2019-10-09 PROCEDURE — G8427 DOCREV CUR MEDS BY ELIG CLIN: HCPCS | Performed by: CLINICAL NURSE SPECIALIST

## 2019-10-09 PROCEDURE — 1036F TOBACCO NON-USER: CPT | Performed by: CLINICAL NURSE SPECIALIST

## 2019-10-09 PROCEDURE — 90653 IIV ADJUVANT VACCINE IM: CPT | Performed by: CLINICAL NURSE SPECIALIST

## 2019-10-09 PROCEDURE — 4040F PNEUMOC VAC/ADMIN/RCVD: CPT | Performed by: CLINICAL NURSE SPECIALIST

## 2019-10-09 PROCEDURE — G0008 ADMIN INFLUENZA VIRUS VAC: HCPCS | Performed by: CLINICAL NURSE SPECIALIST

## 2019-10-09 PROCEDURE — 99213 OFFICE O/P EST LOW 20 MIN: CPT | Performed by: CLINICAL NURSE SPECIALIST

## 2019-10-09 ASSESSMENT — ENCOUNTER SYMPTOMS
SINUS PRESSURE: 0
CHEST TIGHTNESS: 0
SHORTNESS OF BREATH: 0
EYE PAIN: 0
DIARRHEA: 0
NAUSEA: 0
BACK PAIN: 0
VOMITING: 0
EYE DISCHARGE: 0
COLOR CHANGE: 0
WHEEZING: 0
ABDOMINAL PAIN: 0
CONSTIPATION: 0
COUGH: 0
SORE THROAT: 0
TROUBLE SWALLOWING: 0

## 2019-10-09 ASSESSMENT — PATIENT HEALTH QUESTIONNAIRE - PHQ9
SUM OF ALL RESPONSES TO PHQ QUESTIONS 1-9: 0
SUM OF ALL RESPONSES TO PHQ QUESTIONS 1-9: 0

## 2019-10-09 ASSESSMENT — LIFESTYLE VARIABLES
HOW OFTEN DO YOU HAVE SIX OR MORE DRINKS ON ONE OCCASION: 1
HOW OFTEN DO YOU HAVE A DRINK CONTAINING ALCOHOL: 3
HOW MANY STANDARD DRINKS CONTAINING ALCOHOL DO YOU HAVE ON A TYPICAL DAY: 1
AUDIT-C TOTAL SCORE: 5

## 2020-10-07 DIAGNOSIS — E78.2 MIXED HYPERLIPIDEMIA: ICD-10-CM

## 2020-10-07 DIAGNOSIS — I10 ESSENTIAL HYPERTENSION: ICD-10-CM

## 2020-10-07 DIAGNOSIS — Z72.89 OTHER PROBLEMS RELATED TO LIFESTYLE: ICD-10-CM

## 2020-10-07 DIAGNOSIS — Z12.5 ENCOUNTER FOR SCREENING FOR MALIGNANT NEOPLASM OF PROSTATE: ICD-10-CM

## 2020-10-07 LAB
ALBUMIN SERPL-MCNC: 4.3 G/DL (ref 3.5–5.2)
ALP BLD-CCNC: 82 U/L (ref 40–130)
ALT SERPL-CCNC: 19 U/L (ref 5–41)
ANION GAP SERPL CALCULATED.3IONS-SCNC: 15 MMOL/L (ref 7–19)
AST SERPL-CCNC: 19 U/L (ref 5–40)
BACTERIA: NEGATIVE /HPF
BASOPHILS ABSOLUTE: 0.1 K/UL (ref 0–0.2)
BASOPHILS RELATIVE PERCENT: 0.9 % (ref 0–1)
BILIRUB SERPL-MCNC: 1.1 MG/DL (ref 0.2–1.2)
BILIRUBIN URINE: NEGATIVE
BLOOD, URINE: NEGATIVE
BUN BLDV-MCNC: 20 MG/DL (ref 8–23)
CALCIUM SERPL-MCNC: 9.2 MG/DL (ref 8.8–10.2)
CHLORIDE BLD-SCNC: 103 MMOL/L (ref 98–111)
CHOLESTEROL, TOTAL: 114 MG/DL (ref 160–199)
CLARITY: CLEAR
CO2: 25 MMOL/L (ref 22–29)
COLOR: ABNORMAL
CREAT SERPL-MCNC: 1 MG/DL (ref 0.5–1.2)
CRYSTALS, UA: ABNORMAL /HPF
EOSINOPHILS ABSOLUTE: 0.2 K/UL (ref 0–0.6)
EOSINOPHILS RELATIVE PERCENT: 2.6 % (ref 0–5)
EPITHELIAL CELLS, UA: 1 /HPF (ref 0–5)
GFR AFRICAN AMERICAN: >59
GFR NON-AFRICAN AMERICAN: >60
GLUCOSE BLD-MCNC: 104 MG/DL (ref 74–109)
GLUCOSE URINE: NEGATIVE MG/DL
HCT VFR BLD CALC: 50.7 % (ref 42–52)
HDLC SERPL-MCNC: 38 MG/DL (ref 55–121)
HEMOGLOBIN: 16.6 G/DL (ref 14–18)
HEPATITIS C ANTIBODY INTERPRETATION: NORMAL
HYALINE CASTS: 4 /HPF (ref 0–8)
IMMATURE GRANULOCYTES #: 0 K/UL
KETONES, URINE: NEGATIVE MG/DL
LDL CHOLESTEROL CALCULATED: 53 MG/DL
LEUKOCYTE ESTERASE, URINE: NEGATIVE
LYMPHOCYTES ABSOLUTE: 2.2 K/UL (ref 1.1–4.5)
LYMPHOCYTES RELATIVE PERCENT: 33.4 % (ref 20–40)
MCH RBC QN AUTO: 31.7 PG (ref 27–31)
MCHC RBC AUTO-ENTMCNC: 32.7 G/DL (ref 33–37)
MCV RBC AUTO: 96.8 FL (ref 80–94)
MONOCYTES ABSOLUTE: 0.7 K/UL (ref 0–0.9)
MONOCYTES RELATIVE PERCENT: 11 % (ref 0–10)
NEUTROPHILS ABSOLUTE: 3.4 K/UL (ref 1.5–7.5)
NEUTROPHILS RELATIVE PERCENT: 51.9 % (ref 50–65)
NITRITE, URINE: NEGATIVE
PDW BLD-RTO: 13.2 % (ref 11.5–14.5)
PH UA: 5 (ref 5–8)
PLATELET # BLD: 200 K/UL (ref 130–400)
PMV BLD AUTO: 11.4 FL (ref 9.4–12.4)
POTASSIUM SERPL-SCNC: 4.7 MMOL/L (ref 3.5–5)
PROSTATE SPECIFIC ANTIGEN: 0.01 NG/ML (ref 0–4)
PROTEIN UA: NEGATIVE MG/DL
RBC # BLD: 5.24 M/UL (ref 4.7–6.1)
RBC UA: 2 /HPF (ref 0–4)
SODIUM BLD-SCNC: 143 MMOL/L (ref 136–145)
SPECIFIC GRAVITY UA: 1.02 (ref 1–1.03)
TOTAL PROTEIN: 7.4 G/DL (ref 6.6–8.7)
TRIGL SERPL-MCNC: 114 MG/DL (ref 0–149)
UROBILINOGEN, URINE: 1 E.U./DL
WBC # BLD: 6.5 K/UL (ref 4.8–10.8)
WBC UA: 1 /HPF (ref 0–5)

## 2020-10-14 ENCOUNTER — HOSPITAL ENCOUNTER (OUTPATIENT)
Dept: CT IMAGING | Age: 70
Discharge: HOME | End: 2020-10-14
Payer: MEDICARE

## 2020-10-14 ENCOUNTER — OFFICE VISIT (OUTPATIENT)
Dept: FAMILY MEDICINE CLINIC | Age: 70
End: 2020-10-14
Payer: MEDICARE

## 2020-10-14 VITALS
WEIGHT: 246 LBS | BODY MASS INDEX: 30.59 KG/M2 | SYSTOLIC BLOOD PRESSURE: 138 MMHG | DIASTOLIC BLOOD PRESSURE: 82 MMHG | OXYGEN SATURATION: 99 % | TEMPERATURE: 97.3 F | HEART RATE: 55 BPM | HEIGHT: 75 IN

## 2020-10-14 PROBLEM — I48.91 ATRIAL FIBRILLATION (HCC): Status: ACTIVE | Noted: 2020-10-14

## 2020-10-14 PROCEDURE — 1036F TOBACCO NON-USER: CPT | Performed by: CLINICAL NURSE SPECIALIST

## 2020-10-14 PROCEDURE — 93000 ELECTROCARDIOGRAM COMPLETE: CPT | Performed by: CLINICAL NURSE SPECIALIST

## 2020-10-14 PROCEDURE — 99214 OFFICE O/P EST MOD 30 MIN: CPT | Performed by: CLINICAL NURSE SPECIALIST

## 2020-10-14 PROCEDURE — G8427 DOCREV CUR MEDS BY ELIG CLIN: HCPCS | Performed by: CLINICAL NURSE SPECIALIST

## 2020-10-14 PROCEDURE — 4040F PNEUMOC VAC/ADMIN/RCVD: CPT | Performed by: CLINICAL NURSE SPECIALIST

## 2020-10-14 PROCEDURE — G8417 CALC BMI ABV UP PARAM F/U: HCPCS | Performed by: CLINICAL NURSE SPECIALIST

## 2020-10-14 PROCEDURE — 3017F COLORECTAL CA SCREEN DOC REV: CPT | Performed by: CLINICAL NURSE SPECIALIST

## 2020-10-14 PROCEDURE — 90694 VACC AIIV4 NO PRSRV 0.5ML IM: CPT | Performed by: CLINICAL NURSE SPECIALIST

## 2020-10-14 PROCEDURE — 70450 CT HEAD/BRAIN W/O DYE: CPT

## 2020-10-14 PROCEDURE — 1123F ACP DISCUSS/DSCN MKR DOCD: CPT | Performed by: CLINICAL NURSE SPECIALIST

## 2020-10-14 PROCEDURE — G0008 ADMIN INFLUENZA VIRUS VAC: HCPCS | Performed by: CLINICAL NURSE SPECIALIST

## 2020-10-14 PROCEDURE — G8484 FLU IMMUNIZE NO ADMIN: HCPCS | Performed by: CLINICAL NURSE SPECIALIST

## 2020-10-14 PROCEDURE — G0439 PPPS, SUBSEQ VISIT: HCPCS | Performed by: CLINICAL NURSE SPECIALIST

## 2020-10-14 ASSESSMENT — ENCOUNTER SYMPTOMS
ABDOMINAL PAIN: 0
WHEEZING: 0
CONSTIPATION: 0
CHEST TIGHTNESS: 0
BACK PAIN: 0
VOMITING: 0
SINUS PRESSURE: 0
NAUSEA: 0
COLOR CHANGE: 0
SORE THROAT: 0
EYE DISCHARGE: 0
COUGH: 0
SHORTNESS OF BREATH: 0
TROUBLE SWALLOWING: 0
EYE PAIN: 0
DIARRHEA: 0

## 2020-10-14 ASSESSMENT — LIFESTYLE VARIABLES
HOW MANY STANDARD DRINKS CONTAINING ALCOHOL DO YOU HAVE ON A TYPICAL DAY: 0
HOW OFTEN DO YOU HAVE A DRINK CONTAINING ALCOHOL: 2
HOW OFTEN DO YOU HAVE SIX OR MORE DRINKS ON ONE OCCASION: 1
AUDIT-C TOTAL SCORE: 3

## 2020-10-14 ASSESSMENT — PATIENT HEALTH QUESTIONNAIRE - PHQ9
SUM OF ALL RESPONSES TO PHQ QUESTIONS 1-9: 0
1. LITTLE INTEREST OR PLEASURE IN DOING THINGS: 0
SUM OF ALL RESPONSES TO PHQ9 QUESTIONS 1 & 2: 0
2. FEELING DOWN, DEPRESSED OR HOPELESS: 0
SUM OF ALL RESPONSES TO PHQ QUESTIONS 1-9: 0

## 2020-10-14 NOTE — PROGRESS NOTES
Vaccine Information Sheet, \"Influenza - Inactivated\" OR \"Live - Intranasal\"  given to Al Larson, or parent/legal guardian of  Al Larson and verbalized understanding. Patient responses:    Have you ever had a reaction to a flu vaccine? No  Are you able to eat eggs without adverse effects? Yes  Do you have any current illness? No  Have you ever had Guillian [place] Syndrome? No    Flu vaccine given per order. Please see immunization tab.

## 2020-10-14 NOTE — PROGRESS NOTES
Medicare Annual Wellness Visit  Name: Micki Ada Date: 10/14/2020   MRN: [id number] Sex: Male   Age: 69 y.o. Ethnicity: Non-/Non    : 1950 Race: Sohail Regan is here for Medicare AWV    Screenings for behavioral, psychosocial and functional/safety risks, and cognitive dysfunction are all negative except as indicated below. These results, as well as other patient data from the [address] form, are documented in Flowsheets linked to this Encounter. HM  Colonoscopy UTD  PSA 0  Immunizations UTD    No Known Allergies      Prior to Visit Medications    Medication Sig Taking?  Authorizing Provider   atorvastatin (LIPITOR) 20 MG tablet Take 1 tablet by mouth daily Yes CHEIKH Ellis   lisinopril (PRINIVIL;ZESTRIL) 10 MG tablet TAKE 1 TABLET DAILY Yes CHEIKH Valdovinos   zoster recombinant adjuvanted vaccine [facility]) 50 MCG/0.5ML SUSR injection Inject 0.5 mLs into the muscle See Admin Instructions 1 dose now and repeat in 2-6 months Yes CHEIKH Acuna         Past Medical History:   Diagnosis Date    Adult general medical exam     Elevated blood pressure reading     Erectile dysfunction     Mixed hyperlipidemia     Osteoarthritis     Prostate cancer [facility])        Past Surgical History:   Procedure Laterality Date    HIP SURGERY      KNEE SURGERY      PROSTATECTOMY           Family History   Problem Relation Age of Onset    Hypertension Mother     Coronary Art Dis Mother     Coronary Art Dis Father     Mult Sclerosis Sister        CareTeam (Including outside providers/suppliers regularly involved in providing care):   Patient Care Team:  CHEIKH Acuna as PCP - General (Family Medicine)  CHEIKH Acuna as PCP - [facility] Empaneled Provider    Wt Readings from Last 3 Encounters:   10/14/20 246 lb (111.6 kg)   10/09/19 249 lb 9.6 oz (113.2 kg)   19 249 lb 6.4 oz (113.1 kg)     Vitals:    10/14/20 6218 Date(s) Administered    Influenza, High Dose (Fluzone 65 yrs and older) 10/13/2017, 09/13/2018    Influenza, Triv, inactivated, subunit, adjuvanted, IM (Fluad 65 yrs and older) 10/09/2019    Pneumococcal Conjugate 13-valent ([id number]) 08/26/2016    Pneumococcal Polysaccharide ([id number]) 07/22/2014    Tdap (Boostrix, Adacel) 03/13/2018    Zoster Recombinant (Shingrix) 10/09/2019, 12/12/2019        Health Maintenance   Topic Date Due    Annual Wellness Visit (AWV)  05/29/2019    Flu vaccine (1) 09/01/2020    Lipid screen  10/07/2021    Potassium monitoring  10/07/2021    Creatinine monitoring  10/07/2021    PSA counseling  10/07/2021    Colon cancer screen colonoscopy  08/06/2024    DTaP/Tdap/Td vaccine (2 - Td) 03/13/2028    Shingles Vaccine  Completed    Pneumococcal 65+ years Vaccine  Completed    AAA screen  Completed    Hepatitis C screen  Completed    Hepatitis A vaccine  Aged Out    Hepatitis B vaccine  Aged Out    Hib vaccine  Aged Out    Meningococcal (ACWY) vaccine  Aged Out     Recommendations for Sporthold Due: see orders and patient instructions/AVS.  . Recommended screening schedule for the next 5-10 years is provided to the patient in written form: see Patient Instructions/AVS.    Dinahcharly Brigitte was seen today for medicare awv.     Diagnoses and all orders for this visit:    Routine general medical examination at a health care facility    Need for influenza vaccination  -     INFLUENZA, QUADV, ADJUVANTED, 65 YRS =, IM, PF, PREFILL SYR, 0.5ML (FLUAD)

## 2020-10-14 NOTE — PROGRESS NOTES
SUBJECTIVE:  Sean Wills is a 69 y.o. who presents today for Medicare AWV      HPI    Mr Chanell Lopez presents today for AWV and routine follow up. Essential hypertension, well controlled. Has been compliant with med regimen, which has not been changed in years. No dizziness or syncope. No swelling. Normal elytes and renal function. Mixed hyperlipidemia, well controlled. LIPID at goal.  Tolerating statin therapy without new myalgias. LFT WNL. Remains active at home, no routine exercise though. He tells me that while on SC 2 weeks ago, he developed soreness to his right thumb. He then developed soreness that extended up right arm with some slight numbness. He now feels slight pain from his right lateral neck into hand. While sleeping he will develop some right hand stiffness and tingling. With use, right hand and arm seems weaker than left. No chest pain. No leg symptoms. He takes aleve for pain. On exam, he has noticeable irregular heart rate but he does not notice.      Past Medical History:   Diagnosis Date    Adult general medical exam     Elevated blood pressure reading     Erectile dysfunction     Mixed hyperlipidemia     Osteoarthritis     Prostate cancer ([facility] [address]      Past Surgical History:   Procedure Laterality Date    HIP SURGERY      KNEE SURGERY      PROSTATECTOMY       Family History   Problem Relation Age of Onset    Hypertension Mother     Coronary Art Dis Mother     Coronary Art Dis Father     Mult Sclerosis Sister      Social History     Tobacco Use    Smoking status: Former Smoker     Packs/day: 0.25     Years: 10.00     Pack years: 2.50     Types: Cigars     Last attempt to quit: 1975     Years since quittin.1    Smokeless tobacco: Former User     Types: Chew   Substance Use Topics    Alcohol use: Yes     Comment: social     Current Outpatient Medications   Medication Sig Dispense Refill    metoprolol tartrate (LOPRESSOR) 25 MG tablet Take 1 tablet by mouth 2 times daily 60 tablet 1    apixaban (ELIQUIS) 5 MG TABS tablet Take 1 tablet by mouth 2 times daily 60 tablet 1    atorvastatin (LIPITOR) 20 MG tablet Take 1 tablet by mouth daily 90 tablet 1    lisinopril (PRINIVIL;ZESTRIL) 10 MG tablet TAKE 1 TABLET DAILY 90 tablet 4    zoster recombinant adjuvanted vaccine (SHINGRIX) 50 MCG/0.5ML SUSR injection Inject 0.5 mLs into the muscle See Admin Instructions 1 dose now and repeat in 2-6 months 1 each 1     No current facility-administered medications for this visit. No Known Allergies    Review of Systems   Constitutional: Negative for appetite change, chills, diaphoresis, fatigue and fever. HENT: Positive for hearing loss. Negative for congestion, ear pain, postnasal drip, sinus pressure, sore throat and trouble swallowing. Eyes: Negative for pain, discharge and visual disturbance. Respiratory: Negative for cough, chest tightness, shortness of breath and wheezing. Cardiovascular: Negative for chest pain, palpitations and leg swelling. Gastrointestinal: Negative for abdominal pain, constipation, diarrhea, nausea and vomiting. Endocrine: Negative for cold intolerance and heat intolerance. Genitourinary: Negative for difficulty urinating, dysuria, flank pain, frequency, hematuria and urgency. Musculoskeletal: Positive for arthralgias. Negative for back pain, joint swelling and neck pain. Skin: Negative for color change and rash. Neurological: Positive for weakness and numbness. Negative for dizziness, syncope, light-headedness and headaches. Hematological: Negative for adenopathy. Does not bruise/bleed easily. Psychiatric/Behavioral: Negative for behavioral problems, confusion, dysphoric mood, sleep disturbance and suicidal ideas. The patient is not nervous/anxious.          OBJECTIVE:  /82   Pulse 55   Temp 97.3 °F (36.3 °C) (Temporal)   Ht 6' 3\" (1.905 m)   Wt 246 lb (111.6 kg)   SpO2 99%   BMI 30.75 kg/m²    Physical Exam  Vitals signs reviewed. Constitutional:       General: He is not in acute distress. Appearance: Normal appearance. He is well-developed. He is not ill-appearing or toxic-appearing. HENT:      Head: Normocephalic and atraumatic. Right Ear: There is impacted cerumen. Left Ear: Tympanic membrane, ear canal and external ear normal.   Eyes:      General:         Right eye: No discharge. Left eye: No discharge. Conjunctiva/sclera: Conjunctivae normal.      Pupils: Pupils are equal, round, and reactive to light. Neck:      Musculoskeletal: Normal range of motion and neck supple. Thyroid: No thyromegaly. Trachea: No tracheal deviation. Cardiovascular:      Rate and Rhythm: Normal rate. Rhythm irregular. Heart sounds: No murmur. Pulmonary:      Effort: Pulmonary effort is normal. No respiratory distress. Breath sounds: Normal breath sounds. No wheezing or rales. Abdominal:      General: Bowel sounds are normal. There is no distension. Palpations: Abdomen is soft. Tenderness: There is no abdominal tenderness. Genitourinary:     Penis: No tenderness. Musculoskeletal: Normal range of motion. General: No tenderness or deformity. Lymphadenopathy:      Cervical: No cervical adenopathy. Skin:     General: Skin is warm and dry. Findings: No erythema or rash. Neurological:      General: No focal deficit present. Mental Status: He is alert and oriented to person, place, and time. Motor: No weakness (no noticeable weakness on exam). Psychiatric:         Mood and Affect: Mood normal.         Behavior: Behavior normal.         Thought Content:  Thought content normal.         Judgment: Judgment normal.         EKG- afib with rate 112    Lab Results   Component Value Date    PSA 0.01 10/07/2020    PSA 0.01 03/06/2019    PSA <0.01 09/06/2017     Lab Results   Component Value Date    CHOL 114 (L) 10/07/2020    CHOL 133 (L) 03/06/2019 CHOL 148 (L) 09/06/2018     Lab Results   Component Value Date    TRIG 114 10/07/2020    TRIG 103 03/06/2019    TRIG 122 09/06/2018     Lab Results   Component Value Date    HDL 38 (L) 10/07/2020    HDL 44 (L) 03/06/2019    HDL 44 (L) 09/06/2018     Lab Results   Component Value Date    LDLCALC 53 10/07/2020    LDLCALC 68 03/06/2019    LDLCALC 80 09/06/2018     No results found for: LABVLDL, VLDL  No results found for: CHOLHDLRATIO  Lab Results   Component Value Date     10/07/2020    K 4.7 10/07/2020     10/07/2020    CO2 25 10/07/2020    BUN 20 10/07/2020    CREATININE 1.0 10/07/2020    GLUCOSE 104 10/07/2020    CALCIUM 9.2 10/07/2020    PROT 7.4 10/07/2020    LABALBU 4.3 10/07/2020    BILITOT 1.1 10/07/2020    ALKPHOS 82 10/07/2020    AST 19 10/07/2020    ALT 19 10/07/2020    LABGLOM >60 10/07/2020    GFRAA >59 10/07/2020       Lab Results   Component Value Date    WBC 6.5 10/07/2020    HGB 16.6 10/07/2020    HCT 50.7 10/07/2020    MCV 96.8 (H) 10/07/2020     10/07/2020     Hep C antibody- negative    CT HEAD WO CONTRAST  Narrative: EXAMINATION: CT HEAD WO CONTRAST 10/14/2020 10:32 AM  HISTORY: Right arm weakness  Comparison: None  Technique: Sequential imaging was performed from the vertex through  the base of the skull without the use of IV contrast. Sagittal and  coronal reformations were made from the original source data and  reviewed. Automated exposure control was utilized for radiation dose  reduction. Radiation dose:  mGy-cm  Findings: There is diffuse cerebral atrophy. There is no evidence of acute  intracranial hemorrhage or midline shift. There is no evidence of  acute territorial infarct. There are periventricular and subcortical  white matter changes, a nonspecific finding most often seen as sequela  of chronic microvascular ischemia. The ventricles appear normal in  configuration. The basilar cisterns are patent.  Posterior fossa  appears grossly normal. The calvarium

## 2020-10-23 ENCOUNTER — TELEPHONE (OUTPATIENT)
Dept: CARDIOLOGY | Age: 70
End: 2020-10-23

## 2020-10-29 ENCOUNTER — OFFICE VISIT (OUTPATIENT)
Dept: CARDIOLOGY | Age: 70
End: 2020-10-29
Payer: MEDICARE

## 2020-10-29 VITALS
SYSTOLIC BLOOD PRESSURE: 124 MMHG | DIASTOLIC BLOOD PRESSURE: 76 MMHG | BODY MASS INDEX: 30.71 KG/M2 | WEIGHT: 247 LBS | HEART RATE: 84 BPM | HEIGHT: 75 IN | OXYGEN SATURATION: 96 %

## 2020-10-29 PROCEDURE — G8417 CALC BMI ABV UP PARAM F/U: HCPCS | Performed by: NURSE PRACTITIONER

## 2020-10-29 PROCEDURE — 93000 ELECTROCARDIOGRAM COMPLETE: CPT | Performed by: NURSE PRACTITIONER

## 2020-10-29 PROCEDURE — G8484 FLU IMMUNIZE NO ADMIN: HCPCS | Performed by: NURSE PRACTITIONER

## 2020-10-29 PROCEDURE — 1123F ACP DISCUSS/DSCN MKR DOCD: CPT | Performed by: NURSE PRACTITIONER

## 2020-10-29 PROCEDURE — 3017F COLORECTAL CA SCREEN DOC REV: CPT | Performed by: NURSE PRACTITIONER

## 2020-10-29 PROCEDURE — 99204 OFFICE O/P NEW MOD 45 MIN: CPT | Performed by: NURSE PRACTITIONER

## 2020-10-29 PROCEDURE — 4040F PNEUMOC VAC/ADMIN/RCVD: CPT | Performed by: NURSE PRACTITIONER

## 2020-10-29 PROCEDURE — G8427 DOCREV CUR MEDS BY ELIG CLIN: HCPCS | Performed by: NURSE PRACTITIONER

## 2020-10-29 PROCEDURE — 1036F TOBACCO NON-USER: CPT | Performed by: NURSE PRACTITIONER

## 2020-10-29 ASSESSMENT — ENCOUNTER SYMPTOMS
GASTROINTESTINAL NEGATIVE: 1
SORE THROAT: 0
CHEST TIGHTNESS: 0
EYES NEGATIVE: 1
WHEEZING: 0
SHORTNESS OF BREATH: 1
COUGH: 0

## 2020-10-29 NOTE — PATIENT INSTRUCTIONS
[place] at the [facility] and [address] [facility] located on the first floor of [place] [address] through [facility] main entrance and turn immediately to your left. Date/Time:     Patient's contact number:  [phone] (home)     Echocardiogram -  No prep. Takes approximately 30 min. An echocardiogram uses sound waves to produce images of your heart. This commonly used test allows your doctor to see how your heart is beating and pumping blood. Your doctor can use the images from an echocardiogram to identify various abnormalities in the heart muscle and valves. This test has 2 parts:   Ø You will be asked to disrobe from the waist up and given a gown to wear. The technologist will then hook up an EKG monitor to you for the entire exam.   Ø You will then have an ultrasound of your heart (echocardiogram) to assess the heart muscle, heart valves and heart function. You may eat and take any medicines before the exam.     If you need to change your appointment, please call outpatient scheduling at [phone]. [place] at the [facility] and [address] [facility] located on the first floor of [place] [address] through [facility] main entrance and turn immediately to your left. Patient's contact number:  [phone] (home)      Lexiscan Stress Test      Lexiscan (regadenoson injection) is a prescription drug given through an IV line that increases blood flow through the arteries of the heart during a cardiac nuclear stress test.     There are two parts to a Lexiscan stress test: the rest portion and the exercise portion. For the rest portion, a radioactive tracer is injected into your arm through the IV. After 30 to 60 minutes, the process of imaging will begin. A nuclear camera will be placed on your chest area and images are taken for the next 15 to 20 minutes.   For the exercise portion, a nurse will attach EKG electrodes to your chest to monitor your heart

## 2020-10-29 NOTE — PROGRESS NOTES
[facility]  [address] [facility]  [phone]      Chief Complaint / Reason for Being Seen: New onset A. fib    1. Atrial fibrillation, unspecified type ([facility] [address]    2. Essential hypertension    3. Hyperlipidemia, unspecified hyperlipidemia type        Patient with a history of hypertension and hyperlipidemia. Patient was seen by his primary care provider on 10/14/2020. Patient was noted to have irregular heart rate. EKG in the office showed A. fib with RVR at a rate of 113 bpm.  Patient was started on beta-blocker and Eliquis. Patient presents to clinic today for further follow-up and treatment. Patient with a family history of coronary artery disease which includes his father. Patient is a former smoker quit 40 years ago. He is smoked occasional cigars. Patient denies any prior history himself of coronary artery disease, MI or CHF. Patient states he is noted over the last several months decreased stamina and fatigue. There is some shortness of breath with exertion. He denies any chest pain, pressure or tightness. There is no lightheadedness, dizziness or syncope. Subjective: Old records have been obtained from the referring providers. Those records have been reviewed and summarized.       Isaac Sandoval is a 69 y.o. male with the following history as recorded in [facility]:  Patient Active Problem List    Diagnosis Date Noted    Atrial fibrillation ([facility] [address] 10/14/2020    Essential hypertension 03/13/2018    Prostate cancer ([facility]     Mixed hyperlipidemia      Current Outpatient Medications   Medication Sig Dispense Refill    metoprolol tartrate (LOPRESSOR) 25 MG tablet Take 1 tablet by mouth 2 times daily 60 tablet 1    apixaban (ELIQUIS) 5 MG TABS tablet Take 1 tablet by mouth 2 times daily 60 tablet 1    atorvastatin (LIPITOR) 20 MG tablet Take 1 tablet by mouth daily 90 tablet 1    lisinopril (PRINIVIL;ZESTRIL) 10 MG tablet TAKE 1 TABLET DAILY 90 tablet 4    zoster recombinant adjuvanted vaccine (SHINGRIX) 50 MCG/0.5ML SUSR injection Inject 0.5 mLs into the muscle See Admin Instructions 1 dose now and repeat in 2-6 months 1 each 1     No current facility-administered medications for this visit. Allergies: Patient has no known allergies. Past Medical History:   Diagnosis Date    Adult general medical exam     Elevated blood pressure reading     Erectile dysfunction     Mixed hyperlipidemia     Osteoarthritis     Prostate cancer ([address]      Past Surgical History:   Procedure Laterality Date    HIP SURGERY      KNEE SURGERY      PROSTATECTOMY       Family History   Problem Relation Age of Onset    Hypertension Mother     Coronary Art Dis Mother     Coronary Art Dis Father     Mult Sclerosis Sister      Social History     Tobacco Use    Smoking status: Former Smoker     Packs/day: 0.25     Years: 10.00     Pack years: 2.50     Types: Cigars     Last attempt to quit: 1975     Years since quittin.1    Smokeless tobacco: Former User     Types: Chew   Substance Use Topics    Alcohol use: Yes     Comment: social          Review of System:    Review of Systems   Constitutional: Positive for activity change and fatigue. Negative for chills, diaphoresis and fever. HENT: Negative for congestion and sore throat. Eyes: Negative. Respiratory: Positive for shortness of breath. Negative for cough, chest tightness and wheezing. Cardiovascular: Negative for chest pain, palpitations and leg swelling. Gastrointestinal: Negative. Genitourinary: Negative. Musculoskeletal: Negative. Neurological: Negative for dizziness, syncope, light-headedness and headaches. Psychiatric/Behavioral: Negative for confusion. The patient is not nervous/anxious.          Objective:    /76   Pulse 84   Ht 6' 3\" (1.905 m)   Wt 247 lb (112 kg)   SpO2 96%   BMI 30.87 kg/m²     GENERAL - well developed and well nourished    NYDIA ZAPATA Hearing appears normal, conjunctive and lids are normal.  External inspection of ears and nose appear normal.  NECK - no thyromegaly, no JVD, trachea is in the midline  CARDIOVASCULAR - PMI is in the mid line clavicular position, Normal S1 and S2 with no systolic murmur. No S3 or S4    PULMONARY - no respiratory distress. No wheezes or rales. Lungs are clear to ausculation, normal respiratory effort. ABDOMEN  - soft, non tender, no rebound  MUSCULOSKELETAL  - range of motion of the upper and lower extermites appears normal and equal and is without pain   EXTREMITIES - no significant edema   NEUROLOGIC - gait and station are normal  SKIN - turgor is normal, skin warm and dry. PSYCHIATRIC - normal mood and affect, alert and orientated x 3,      ASSESSMENT:    ALL THE CARDIOLOGY PROBLEMS ARE LISTED ABOVE; HOWEVER, THE FOLLOWING SPECIFIC CARDIAC PROBLEMS / CONDITIONS WERE ADDRESSED AND TREATED DURING THE OFFICE VISIT TODAY:       Cardiac Specific Problem  Discussion and Plan         1. New onset A. fib  initial encounter   EKG at the office showed atrial fib with a controlled rate of 84 to 98 bpm.  Patient is on Eliquis 5 mg twice daily as well as Lopressor 25 mg twice daily. Will obtain 2D echo to evaluate left atrial size. Did discussed in great detail with patient and patient's wife what atrial fib is, risk of blood clot and the importance of the blood thinner. Both verbalized understanding and agreed with plan. Did discuss with patient patient needs to be diligent about taking his Eliquis as prescribed with the anticipation of cardioversion in 6 weeks. We will also order Hayden Min as patient has never had an ischemic study. 2.   Dyspnea on exertion  initial encounter   O2 sat in the office 96%. Will order 2D echo to evaluate current EF and for any valvular heart disease. Will order Hayden Min for ischemic study.          3.   Hypertension  initial encounter   Blood pressure in the office well controlled 124/76. 4.  Hyperlipidemia -managed by primary care provider. Patient is on Lipitor 20 mg daily. PLAN:    No orders of the defined types were placed in this encounter. No orders of the defined types were placed in this encounter. No follow-ups on file. Discussed with patient and spouse. I greatly appreciate the opportunity to meet Fran [place] and your confidence in allowing me to participate in his cardiovascular care. CHEIKH Blake NP 10/29/2020 10:34 AM CDT                    This dictation was generated by voice recognition computer software. Although all attempts are made to edit dictation for accuracy, there may be errors in the transcription that are not intended.

## 2020-11-12 ENCOUNTER — HOSPITAL ENCOUNTER (OUTPATIENT)
Dept: NUCLEAR MEDICINE | Age: 70
Discharge: HOME | End: 2020-11-14
Payer: MEDICARE

## 2020-11-12 ENCOUNTER — HOSPITAL ENCOUNTER (OUTPATIENT)
Dept: NON INVASIVE DIAGNOSTICS | Age: 70
Discharge: HOME | End: 2020-11-12
Payer: MEDICARE

## 2020-11-12 LAB
LV EF: 40 %
LVEF MODALITY: NORMAL

## 2020-11-12 PROCEDURE — 93017 CV STRESS TEST TRACING ONLY: CPT

## 2020-11-12 PROCEDURE — 3430000000 HC RX DIAGNOSTIC RADIOPHARMACEUTICAL: Performed by: NURSE PRACTITIONER

## 2020-11-12 PROCEDURE — A9500 TC99M SESTAMIBI: HCPCS | Performed by: NURSE PRACTITIONER

## 2020-11-12 PROCEDURE — 6360000002 HC RX W HCPCS: Performed by: INTERNAL MEDICINE

## 2020-11-12 PROCEDURE — 6360000004 HC RX CONTRAST MEDICATION: Performed by: NURSE PRACTITIONER

## 2020-11-12 PROCEDURE — C8929 TTE W OR WO FOL WCON,DOPPLER: HCPCS

## 2020-11-12 RX ADMIN — REGADENOSON 0.4 MG: 0.08 INJECTION, SOLUTION INTRAVENOUS at 11:40

## 2020-11-12 RX ADMIN — TETRAKIS(2-METHOXYISOBUTYLISOCYANIDE)COPPER(I) TETRAFLUOROBORATE 30 MILLICURIE: 1 INJECTION, POWDER, LYOPHILIZED, FOR SOLUTION INTRAVENOUS at 14:49

## 2020-11-12 RX ADMIN — TETRAKIS(2-METHOXYISOBUTYLISOCYANIDE)COPPER(I) TETRAFLUOROBORATE 10 MILLICURIE: 1 INJECTION, POWDER, LYOPHILIZED, FOR SOLUTION INTRAVENOUS at 14:49

## 2020-11-12 RX ADMIN — PERFLUTREN 1.65 MG: 6.52 INJECTION, SUSPENSION INTRAVENOUS at 09:32

## 2020-11-16 ENCOUNTER — TELEPHONE (OUTPATIENT)
Dept: CARDIOLOGY | Age: 70
End: 2020-11-16

## 2020-11-16 LAB
LV EF: 29 %
LVEF MODALITY: NORMAL

## 2020-11-24 ENCOUNTER — TELEPHONE (OUTPATIENT)
Dept: ADMINISTRATIVE | Age: 70
End: 2020-11-24

## 2020-11-25 DIAGNOSIS — I48.91 ATRIAL FIBRILLATION, UNSPECIFIED TYPE (HCC): ICD-10-CM

## 2020-11-25 LAB
ANION GAP SERPL CALCULATED.3IONS-SCNC: 13 MMOL/L (ref 7–19)
BUN BLDV-MCNC: 16 MG/DL (ref 8–23)
CALCIUM SERPL-MCNC: 9.2 MG/DL (ref 8.8–10.2)
CHLORIDE BLD-SCNC: 103 MMOL/L (ref 98–111)
CO2: 22 MMOL/L (ref 22–29)
CREAT SERPL-MCNC: 1 MG/DL (ref 0.5–1.2)
GFR AFRICAN AMERICAN: >59
GFR NON-AFRICAN AMERICAN: >60
GLUCOSE BLD-MCNC: 107 MG/DL (ref 74–109)
HCT VFR BLD CALC: 47.6 % (ref 42–52)
HEMOGLOBIN: 15.4 G/DL (ref 14–18)
MCH RBC QN AUTO: 30.4 PG (ref 27–31)
MCHC RBC AUTO-ENTMCNC: 32.4 G/DL (ref 33–37)
MCV RBC AUTO: 94.1 FL (ref 80–94)
PDW BLD-RTO: 13.4 % (ref 11.5–14.5)
PLATELET # BLD: 206 K/UL (ref 130–400)
PMV BLD AUTO: 12.3 FL (ref 9.4–12.4)
POTASSIUM SERPL-SCNC: 4.2 MMOL/L (ref 3.5–5)
RBC # BLD: 5.06 M/UL (ref 4.7–6.1)
SODIUM BLD-SCNC: 138 MMOL/L (ref 136–145)
WBC # BLD: 6.5 K/UL (ref 4.8–10.8)

## 2020-11-29 ENCOUNTER — OFFICE VISIT (OUTPATIENT)
Age: 70
End: 2020-11-29

## 2020-11-29 VITALS — OXYGEN SATURATION: 98 % | TEMPERATURE: 97.8 F | HEART RATE: 98 BPM

## 2020-11-30 LAB — SARS-COV-2, PCR: NOT DETECTED

## 2020-11-30 RX ORDER — LISINOPRIL 10 MG/1
TABLET ORAL
Qty: 90 TABLET | Refills: 3 | Status: SHIPPED | OUTPATIENT
Start: 2020-11-30 | End: 2021-10-21

## 2020-12-03 ENCOUNTER — HOSPITAL ENCOUNTER (OUTPATIENT)
Dept: CARDIAC CATH/INVASIVE PROCEDURES | Age: 70
Discharge: HOME | End: 2020-12-03
Attending: INTERNAL MEDICINE | Admitting: INTERNAL MEDICINE
Payer: MEDICARE

## 2020-12-03 VITALS
HEIGHT: 75 IN | DIASTOLIC BLOOD PRESSURE: 76 MMHG | RESPIRATION RATE: 18 BRPM | TEMPERATURE: 96.9 F | OXYGEN SATURATION: 97 % | WEIGHT: 237 LBS | BODY MASS INDEX: 29.47 KG/M2 | HEART RATE: 58 BPM | SYSTOLIC BLOOD PRESSURE: 142 MMHG

## 2020-12-03 LAB
LV EF: 35 %
LVEF MODALITY: NORMAL

## 2020-12-03 PROCEDURE — 92960 CARDIOVERSION ELECTRIC EXT: CPT | Performed by: INTERNAL MEDICINE

## 2020-12-03 PROCEDURE — C1894 INTRO/SHEATH, NON-LASER: HCPCS

## 2020-12-03 PROCEDURE — 99153 MOD SED SAME PHYS/QHP EA: CPT

## 2020-12-03 PROCEDURE — 2709999900 HC NON-CHARGEABLE SUPPLY

## 2020-12-03 PROCEDURE — 92960 CARDIOVERSION ELECTRIC EXT: CPT

## 2020-12-03 PROCEDURE — 6360000004 HC RX CONTRAST MEDICATION: Performed by: INTERNAL MEDICINE

## 2020-12-03 PROCEDURE — 99152 MOD SED SAME PHYS/QHP 5/>YRS: CPT | Performed by: INTERNAL MEDICINE

## 2020-12-03 PROCEDURE — 6360000002 HC RX W HCPCS

## 2020-12-03 PROCEDURE — 2500000003 HC RX 250 WO HCPCS

## 2020-12-03 PROCEDURE — 93458 L HRT ARTERY/VENTRICLE ANGIO: CPT

## 2020-12-03 PROCEDURE — C1887 CATHETER, GUIDING: HCPCS

## 2020-12-03 PROCEDURE — 2580000003 HC RX 258: Performed by: INTERNAL MEDICINE

## 2020-12-03 PROCEDURE — 99152 MOD SED SAME PHYS/QHP 5/>YRS: CPT

## 2020-12-03 PROCEDURE — C1769 GUIDE WIRE: HCPCS

## 2020-12-03 PROCEDURE — 93458 L HRT ARTERY/VENTRICLE ANGIO: CPT | Performed by: INTERNAL MEDICINE

## 2020-12-03 PROCEDURE — 93005 ELECTROCARDIOGRAM TRACING: CPT | Performed by: INTERNAL MEDICINE

## 2020-12-03 RX ORDER — AMOXICILLIN 500 MG/1
CAPSULE ORAL
COMMUNITY
Start: 2020-10-20 | End: 2021-09-30

## 2020-12-03 RX ORDER — SODIUM CHLORIDE 0.9 % (FLUSH) 0.9 %
10 SYRINGE (ML) INJECTION PRN
Status: DISCONTINUED | OUTPATIENT
Start: 2020-12-03 | End: 2020-12-03 | Stop reason: HOSPADM

## 2020-12-03 RX ORDER — ONDANSETRON 2 MG/ML
4 INJECTION INTRAMUSCULAR; INTRAVENOUS EVERY 6 HOURS PRN
Status: DISCONTINUED | OUTPATIENT
Start: 2020-12-03 | End: 2020-12-03 | Stop reason: HOSPADM

## 2020-12-03 RX ORDER — SODIUM CHLORIDE 0.9 % (FLUSH) 0.9 %
10 SYRINGE (ML) INJECTION EVERY 12 HOURS SCHEDULED
Status: DISCONTINUED | OUTPATIENT
Start: 2020-12-03 | End: 2020-12-03 | Stop reason: HOSPADM

## 2020-12-03 RX ORDER — SODIUM CHLORIDE 9 MG/ML
INJECTION, SOLUTION INTRAVENOUS CONTINUOUS
Status: DISCONTINUED | OUTPATIENT
Start: 2020-12-03 | End: 2020-12-03 | Stop reason: HOSPADM

## 2020-12-03 RX ORDER — CARVEDILOL 6.25 MG/1
6.25 TABLET ORAL 2 TIMES DAILY
Qty: 180 TABLET | Refills: 5 | Status: SHIPPED | OUTPATIENT
Start: 2020-12-03 | End: 2022-02-28

## 2020-12-03 RX ORDER — ASPIRIN 325 MG
325 TABLET ORAL ONCE
Status: DISCONTINUED | OUTPATIENT
Start: 2020-12-03 | End: 2020-12-03 | Stop reason: HOSPADM

## 2020-12-03 RX ORDER — CARVEDILOL 6.25 MG/1
6.25 TABLET ORAL 2 TIMES DAILY
Qty: 180 TABLET | Refills: 3 | Status: SHIPPED | OUTPATIENT
Start: 2020-12-03 | End: 2020-12-03 | Stop reason: SDUPTHER

## 2020-12-03 RX ORDER — NAPROXEN SODIUM 220 MG
220 TABLET ORAL 2 TIMES DAILY PRN
COMMUNITY
End: 2020-12-10

## 2020-12-03 RX ORDER — AMIODARONE HYDROCHLORIDE 200 MG/1
200 TABLET ORAL DAILY
Qty: 100 TABLET | Refills: 5 | Status: SHIPPED | OUTPATIENT
Start: 2020-12-03 | End: 2021-10-18

## 2020-12-03 RX ORDER — ACETAMINOPHEN 325 MG/1
650 TABLET ORAL EVERY 4 HOURS PRN
Status: DISCONTINUED | OUTPATIENT
Start: 2020-12-03 | End: 2020-12-03 | Stop reason: HOSPADM

## 2020-12-03 RX ORDER — AMIODARONE HYDROCHLORIDE 200 MG/1
200 TABLET ORAL DAILY
Qty: 100 TABLET | Refills: 3 | Status: SHIPPED | OUTPATIENT
Start: 2020-12-03 | End: 2020-12-03 | Stop reason: SDUPTHER

## 2020-12-03 RX ORDER — NITROGLYCERIN 0.4 MG/1
0.4 TABLET SUBLINGUAL EVERY 5 MIN PRN
Status: DISCONTINUED | OUTPATIENT
Start: 2020-12-03 | End: 2020-12-03 | Stop reason: HOSPADM

## 2020-12-03 RX ADMIN — SODIUM CHLORIDE: 9 INJECTION, SOLUTION INTRAVENOUS at 10:45

## 2020-12-03 RX ADMIN — IOPAMIDOL 125 ML: 612 INJECTION, SOLUTION INTRAVENOUS at 11:43

## 2020-12-03 ASSESSMENT — ENCOUNTER SYMPTOMS
ABDOMINAL DISTENTION: 0
ABDOMINAL PAIN: 0
BLOOD IN STOOL: 0
SHORTNESS OF BREATH: 0
DIARRHEA: 0
VOMITING: 0
COUGH: 0
WHEEZING: 0
BACK PAIN: 0

## 2020-12-03 NOTE — H&P
Patient:  Misty Lyon                  1950  MRN: [id number]    PROBLEM LIST:    Patient Active Problem List    Diagnosis Date Noted    Atrial fibrillation ([facility] [address] 10/14/2020     Priority: Low    Essential hypertension 03/13/2018     Priority: Low    Prostate cancer [facility])      Priority: Low    Mixed hyperlipidemia      Priority: Low       PRESENTATION: Misty Lyon is a 69y.o. year old male who presents with new onset atrial fibrillation diagnosed on EKG 10/14/2020 started on beta-blocker and Eliquis with symptoms of fatigue and reduced stamina, abnormal Lexiscan with ejection fraction reported at 29% without obvious infarct or ischemia, echo EF of 40% being referred for cardiac catheterization. REVIEW OF SYSTEMS:  Review of Systems   Constitutional: Positive for fatigue. Negative for activity change and diaphoresis. HENT: Negative for hearing loss, nosebleeds and tinnitus. Eyes: Negative for visual disturbance. Respiratory: Negative for cough, shortness of breath and wheezing. Cardiovascular: Negative for chest pain, palpitations and leg swelling. Gastrointestinal: Negative for abdominal distention, abdominal pain, blood in stool, diarrhea and vomiting. Endocrine: Negative for cold intolerance, heat intolerance, polydipsia, polyphagia and polyuria. Genitourinary: Negative for difficulty urinating, flank pain and hematuria. Musculoskeletal: Negative for arthralgias, back pain, joint swelling and myalgias. Skin: Negative for pallor and rash. Neurological: Negative for dizziness, seizures, syncope and headaches. Psychiatric/Behavioral: Negative for behavioral problems and dysphoric mood. The patient is not nervous/anxious.         Past Medical History:      Diagnosis Date    Adult general medical exam     Elevated blood pressure reading     Erectile dysfunction     Mixed hyperlipidemia     Osteoarthritis     Prostate cancer [facility])        Past Surgical History: Procedure Laterality Date    HIP SURGERY      KNEE SURGERY      PROSTATECTOMY         Medications Prior to Admission:    Prior to Admission medications    Medication Sig Start Date End Date Taking? Authorizing Provider   lisinopril (PRINIVIL;ZESTRIL) 10 MG tablet TAKE 1 TABLET DAILY 20   Sherrin Inch, APRN   metoprolol tartrate (LOPRESSOR) 25 MG tablet TAKE 1 TABLET BY MOUTH TWICE A DAY 20   Sherrin Inch, APRN   apixaban (ELIQUIS) 5 MG TABS tablet Take 1 tablet by mouth 2 times daily 10/14/20   Sherrin Inch, APRN   atorvastatin (LIPITOR) 20 MG tablet Take 1 tablet by mouth daily 20   Sherrin Inch, APRN   zoster recombinant adjuvanted vaccine [facility]) 50 MCG/0.5ML SUSR injection Inject 0.5 mLs into the muscle See Admin Instructions 1 dose now and repeat in 2-6 months 3/13/19   Sheryaima Inch, CHEIKH       Allergies:  Patient has no known allergies.     Past Social History:  Social History     Socioeconomic History    Marital status:      Spouse name: Not on file    Number of children: Not on file    Years of education: Not on file    Highest education level: Not on file   Occupational History    Not on file   Social Needs    Financial resource strain: Not on file    Food insecurity     Worry: Not on file     Inability: Not on file    Transportation needs     Medical: Not on file     Non-medical: Not on file   Tobacco Use    Smoking status: Former Smoker     Packs/day: 0.25     Years: 10.00     Pack years: 2.50     Types: Cigars     Last attempt to quit: 1975     Years since quittin.2    Smokeless tobacco: Former User     Types: Chew   Substance and Sexual Activity    Alcohol use: Yes     Comment: social    Drug use: No    Sexual activity: Not on file   Lifestyle    Physical activity     Days per week: Not on file     Minutes per session: Not on file    Stress: Not on file   Relationships    Social connections     Talks on phone: Not on file LAB DATA:  CBC: No results for input(s): WBC, HGB, PLT in the last 72 hours. BMP:  No results for input(s): NA, K, CL, CO2, BUN, CREATININE, GLUCOSE in the last 72 hours. Hepatic: No results for input(s): AST, ALT, ALB, BILITOT, ALKPHOS in the last 72 hours. CK, CKMB, Troponin: @LABRCNT (CKTOTAL:3, CKMB:3, TROPONINI:3)@  Pro-BNP: No results for input(s): BNP in the last 72 hours. Lipids: No results for input(s): CHOL, HDL in the last 72 hours. Invalid input(s): LDL  ABGs: No results for input(s): PHART, FUX9XLD, PO2ART, SDX2QHV, BEART, HGBAE, T1NXZWRX, CARBOXHGBART, 02THERAPY in the last 72 hours. INR: No results for input(s): INR in the last 72 hours. A1c:Invalid input(s): HEMOGLOBIN A1C  URINALYSIS:   Lab Results   Component Value Date    NITRU Negative 10/07/2020    WBCUA 1 10/07/2020    BACTERIA NEGATIVE 10/07/2020    RBCUA 2 10/07/2020    BLOODU Negative 10/07/2020    SPECGRAV 1.022 10/07/2020    GLUCOSEU Negative 10/07/2020     -----------------------------------------------------------------  IMAGING:  No orders to display     [facility] Nuclear Stress Test Report    Procedure date: 11/12/20    Indications: shortness of breath    Procedure: Stress was performed with injection of 0.4 mg Lexiscan. Vital signs and EKG were monitored. Technetium-99 sestamibi was    injected in divided doses, approximately 10 mCi and 30 mCi    respectively for rest and stress imaging. The patient was discharged    in stable condition. Results: Patient had no symptoms during infusion. Baseline EKG showed    atrial fibrillation with nonspecific ST/T changes. During stress there    were no significant EKG changes or rhythm changes. Baseline and peak    blood pressures were 142/99, and 155/105 respectively.  Baseline and    peak heart rates were 111 and  132 respectively. Sheridan Turneran/[facility] Report    Date of Procedure: 11/12/2020    The patient was injected with 58.3 millicuries (mCi) of Technetium    (Tc99m).  After an appropriate level of stress the patient was    re-injected with 17.3 millicuries (mCi) of Technetium (Tc99m).  Repeat    gated images were then performed per standard protocol. Findings:    1.  Analysis of the the stress and rest images reveals small mid    anterior defect consistent with breast attenuation without    reversibility. 2.  Analysis of the gated images reveals severely reduced left    ventricular function with a calculated ejection fraction of 29 %. LV    appears normal in size.              Impression    Impression:    There is no obvious infarct or ischemia, with a calculated ejection    fraction of 29 %. Suggest: Clinical correlation with echocardiography. LV function    appears depressed. If new cardiomyopathy consideration for further    evaluation with cardiac catheterization. Signed by Dr Stephani Ring on 11/12/2020 5:31 PM      Type of Study      TTE procedure:ECHO 2D W/DOPPLER/COLOR/CONTRAST. Study Location: Echo Lab  Technical Quality: Adequate visualization     Patient Status: Outpatient     Contrast Medium: Definity.  Amount - 3 ml     BP: 129/76 mmHg     Indications:Dyspnea/SOB.      Conclusions      Summary   Technically difficult study because of body habitus and atrial   fibrillation with some images suboptimal   Normal left ventricular size with global systolic dysfunction estimated   ejection fraction 40%   Mild concentric left ventricular hypertrophy with abnormal rhythm   precluding characterization of diastolic function   Moderately thickened aortic valve which is poorly visualized but offers no   significant stenosis and trace insufficiency   Mild to moderately thickened mitral valve with apparent early   calcification but only minimally reduced mobility no significant stenosis   and mild regurgitation   Mild pulmonic insufficiency   Normal right-sided chambers with preserved RV systolic function   Mild tricuspid regurgitation with RVSP estimate 39 mmHg   Normal IVC dimension consistent with normal right atrial filling pressures   No significant pericardial effusion   Aorta is dilated at the root at 3.8 cm and in the ascending portion at 4.1   cm   Definity contrast utilized to better define endocardial borders      Signature      ----------------------------------------------------------------   Electronically signed by Mellissa Ibarra MD(Interpreting physician)   on 11/12/2020 10:58 AM   ----------------------------------------------------------------      Assessment and Recommendations: This is a 69y.o. year old male with past medical history of hypertension, hyperlipidemia, recent diagnosis of atrial fibrillation 10/14/2020 on EKG initiated on Eliquis with symptoms of fatigue and reduced stamina, abnormal Lexiscan with ejection fraction of 29% and echo ejection fraction 40% with new cardiomyopathy of unclear etiology being referred for cardiac catheterization. Risks, benefits, alternatives of cardiac catheterization/PCI/DCCV discussed with the patient and full informed consent obtained.   Acceptable Mallampati score  Consent for moderate conscious sedation  ASA 3            Electronically signed by Irma Moreno MD on 12/3/2020 at 7:44 AM

## 2020-12-03 NOTE — PROCEDURES
Date of Procedure:  12/3/2020     Indications:  Atrial fibrillation with an appropriate duration of anticoagulation for sinoatrial node dysfunction    Conscious Sedation Protocol Used During this Procedure -        Anesthesia: Moderate   Sedation:   4 mg Midazolam (Versed)   125  mcg Fentanyl   Start time:  10:29 AM   Stop time:  11:22 AM   ASA Class:  3   EBL  not applicable     A trained medical personnel administered medications at my direction. The patient was monitored continuously with ECG, pulse oximetry, blood pressure monitoring and direct observation. After obtaining informed written consent and an appropriate level of conscious sedation, DCCV with 360 J was successful in restoring sinus rhythm. Complications:  none      Impression:  Successful DC cardioversion of atrial fibrillation to normal sinus rhythm on Eliquis 5 mg twice a day has been used for anticoagulation.     Electronically signed by Moe Garrett MD on 12/3/20

## 2020-12-04 LAB
EKG P AXIS: -2 DEGREES
EKG P AXIS: NORMAL DEGREES
EKG P-R INTERVAL: 178 MS
EKG P-R INTERVAL: NORMAL MS
EKG Q-T INTERVAL: 382 MS
EKG Q-T INTERVAL: 462 MS
EKG QRS DURATION: 90 MS
EKG QRS DURATION: 90 MS
EKG QTC CALCULATION (BAZETT): 438 MS
EKG QTC CALCULATION (BAZETT): 454 MS
EKG T AXIS: 18 DEGREES
EKG T AXIS: 30 DEGREES

## 2020-12-04 PROCEDURE — 93010 ELECTROCARDIOGRAM REPORT: CPT | Performed by: INTERNAL MEDICINE

## 2020-12-10 ENCOUNTER — TELEPHONE (OUTPATIENT)
Dept: CARDIOLOGY | Age: 70
End: 2020-12-10

## 2020-12-10 ENCOUNTER — OFFICE VISIT (OUTPATIENT)
Dept: FAMILY MEDICINE CLINIC | Age: 70
End: 2020-12-10
Payer: MEDICARE

## 2020-12-10 VITALS
WEIGHT: 243.2 LBS | DIASTOLIC BLOOD PRESSURE: 78 MMHG | SYSTOLIC BLOOD PRESSURE: 136 MMHG | BODY MASS INDEX: 30.4 KG/M2 | OXYGEN SATURATION: 98 % | HEART RATE: 69 BPM | TEMPERATURE: 96.7 F

## 2020-12-10 PROCEDURE — 3017F COLORECTAL CA SCREEN DOC REV: CPT | Performed by: CLINICAL NURSE SPECIALIST

## 2020-12-10 PROCEDURE — G8427 DOCREV CUR MEDS BY ELIG CLIN: HCPCS | Performed by: CLINICAL NURSE SPECIALIST

## 2020-12-10 PROCEDURE — 1036F TOBACCO NON-USER: CPT | Performed by: CLINICAL NURSE SPECIALIST

## 2020-12-10 PROCEDURE — 1123F ACP DISCUSS/DSCN MKR DOCD: CPT | Performed by: CLINICAL NURSE SPECIALIST

## 2020-12-10 PROCEDURE — G8417 CALC BMI ABV UP PARAM F/U: HCPCS | Performed by: CLINICAL NURSE SPECIALIST

## 2020-12-10 PROCEDURE — G8484 FLU IMMUNIZE NO ADMIN: HCPCS | Performed by: CLINICAL NURSE SPECIALIST

## 2020-12-10 PROCEDURE — 4040F PNEUMOC VAC/ADMIN/RCVD: CPT | Performed by: CLINICAL NURSE SPECIALIST

## 2020-12-10 PROCEDURE — 99213 OFFICE O/P EST LOW 20 MIN: CPT | Performed by: CLINICAL NURSE SPECIALIST

## 2020-12-10 ASSESSMENT — ENCOUNTER SYMPTOMS
EYE PAIN: 0
NAUSEA: 0
CHEST TIGHTNESS: 0
ABDOMINAL PAIN: 0
TROUBLE SWALLOWING: 0
ANAL BLEEDING: 0
WHEEZING: 0
SORE THROAT: 0
EYE DISCHARGE: 0
SHORTNESS OF BREATH: 0
CONSTIPATION: 0
VOMITING: 0
DIARRHEA: 0
BACK PAIN: 0
BLOOD IN STOOL: 0
SINUS PRESSURE: 0
COUGH: 0
COLOR CHANGE: 0

## 2020-12-10 NOTE — PROGRESS NOTES
SUBJECTIVE:  Yosi Polanco is a 69 y.o. who presents today for 1 Month Follow-Up      HPI    Mr Adelia Ulloa presents today for a one month follow up on newly diagnosed atrial fibrillation. The atrial fib was found incidentally at a routine visit last month. He was asymptomatic other than some progressive fatigue and shortness of breath that he really blamed on other causes like age. Since his diagnosis he has established with cardiology. He underwent successful cardioversion last week. He is tolerating eliquis therapy without bleeding. Seems to be tolerating newest meds: amiodarone and coreg also. Cardiac cath on 12/3 showed single vessel intermediate grade disease mid LAD and EF 35%. Past Medical History:   Diagnosis Date    Adult general medical exam     CAD (coronary artery disease)     Elevated blood pressure reading     Erectile dysfunction     Hypertension     Mixed hyperlipidemia     Osteoarthritis     Prostate cancer [facility])      Past Surgical History:   Procedure Laterality Date    CARDIAC CATHETERIZATION  2020    EF 35%, moderate diffuse disease in LAD    HIP SURGERY      KNEE SURGERY      PROSTATECTOMY       Family History   Problem Relation Age of Onset    Hypertension Mother     Coronary Art Dis Mother     Coronary Art Dis Father     Mult Sclerosis Sister      Social History     Tobacco Use    Smoking status: Former Smoker     Packs/day: 0.25     Years: 10.00     Pack years: 2.50     Types: Cigars     Last attempt to quit: 1975     Years since quittin.2    Smokeless tobacco: Former User     Types: Chew   Substance Use Topics    Alcohol use: Yes     Comment: social     Current Outpatient Medications   Medication Sig Dispense Refill    amiodarone (CORDARONE) 200 MG tablet Take 1 tablet by mouth daily Take amiodarone 200 mg twice daily for 10 days and then 200 mg p.o. daily.  100 tablet 5    carvedilol (COREG) 6.25 MG tablet Take 1 tablet by mouth 2 times daily 180 tablet 5    lisinopril (PRINIVIL;ZESTRIL) 10 MG tablet TAKE 1 TABLET DAILY 90 tablet 3    apixaban (ELIQUIS) 5 MG TABS tablet Take 1 tablet by mouth 2 times daily 60 tablet 1    atorvastatin (LIPITOR) 20 MG tablet Take 1 tablet by mouth daily 90 tablet 1    zoster recombinant adjuvanted vaccine (SHINGRIX) 50 MCG/0.5ML SUSR injection Inject 0.5 mLs into the muscle See Admin Instructions 1 dose now and repeat in 2-6 months 1 each 1    amoxicillin (AMOXIL) 500 MG capsule TAKE 4 CAPSULES BY MOUTH 1 HR PRIOR TO DENTAL APPT. No current facility-administered medications for this visit. No Known Allergies    Review of Systems   Constitutional: Negative for appetite change, chills, diaphoresis, fatigue and fever. HENT: Negative for congestion, ear pain, hearing loss, postnasal drip, sinus pressure, sore throat and trouble swallowing. Eyes: Negative for pain, discharge and visual disturbance. Respiratory: Negative for cough, chest tightness, shortness of breath and wheezing. Cardiovascular: Negative for chest pain, palpitations and leg swelling. Gastrointestinal: Negative for abdominal pain, anal bleeding, blood in stool, constipation, diarrhea, nausea and vomiting. Genitourinary: Negative for difficulty urinating, dysuria, flank pain, frequency, hematuria and urgency. Musculoskeletal: Negative for arthralgias, back pain, joint swelling and neck pain. Skin: Negative for color change and rash. Neurological: Negative for dizziness, syncope, weakness, light-headedness and headaches. Hematological: Negative for adenopathy. Does not bruise/bleed easily. Psychiatric/Behavioral: Negative for confusion and dysphoric mood. The patient is not nervous/anxious. OBJECTIVE:  /78   Pulse 69   Temp 96.7 °F (35.9 °C) (Temporal)   Wt 243 lb 3.2 oz (110.3 kg)   SpO2 98%   BMI 30.40 kg/m²    Physical Exam  Vitals signs reviewed.    Constitutional:       General: He is not in acute distress. Appearance: Normal appearance. He is well-developed. He is not ill-appearing or toxic-appearing. HENT:      Head: Normocephalic and atraumatic. Eyes:      General:         Right eye: No discharge. Left eye: No discharge. Conjunctiva/sclera: Conjunctivae normal.      Pupils: Pupils are equal, round, and reactive to light. Neck:      Musculoskeletal: Normal range of motion and neck supple. Thyroid: No thyromegaly. Trachea: No tracheal deviation. Cardiovascular:      Rate and Rhythm: Normal rate and regular rhythm. Heart sounds: No murmur. Pulmonary:      Effort: Pulmonary effort is normal. No respiratory distress. Breath sounds: Normal breath sounds. No wheezing or rales. Genitourinary:     Penis: No tenderness. Musculoskeletal: Normal range of motion. General: No deformity. Right lower leg: No edema. Left lower leg: No edema. Skin:     General: Skin is warm and dry. Findings: No erythema or rash. Neurological:      General: No focal deficit present. Mental Status: He is alert and oriented to person, place, and time. Mental status is at baseline. Motor: No weakness. Gait: Gait normal.   Psychiatric:         Mood and Affect: Mood normal.         Behavior: Behavior normal.         Thought Content: Thought content normal.         Judgment: Judgment normal.         ASSESSMENT/PLAN:  1. Paroxysmal atrial fibrillation (HCC)  S/p cardioversion  NSR on exam today  Tolerating med regimen   Keep follow up with cardiology  Plan TFT if remains on amiodarone     2. Cardiomyopathy, unspecified type ([address]  New, EF 35% on cath  [facility] to see improvement with resolution of afib          Return in about 3 months (around 3/10/2021).

## 2020-12-30 ENCOUNTER — OFFICE VISIT (OUTPATIENT)
Dept: CARDIOLOGY | Age: 70
End: 2020-12-30
Payer: MEDICARE

## 2020-12-30 VITALS
HEART RATE: 53 BPM | WEIGHT: 247 LBS | BODY MASS INDEX: 30.71 KG/M2 | SYSTOLIC BLOOD PRESSURE: 124 MMHG | DIASTOLIC BLOOD PRESSURE: 84 MMHG | HEIGHT: 75 IN

## 2020-12-30 PROCEDURE — G8417 CALC BMI ABV UP PARAM F/U: HCPCS | Performed by: CLINICAL NURSE SPECIALIST

## 2020-12-30 PROCEDURE — 4040F PNEUMOC VAC/ADMIN/RCVD: CPT | Performed by: CLINICAL NURSE SPECIALIST

## 2020-12-30 PROCEDURE — 1036F TOBACCO NON-USER: CPT | Performed by: CLINICAL NURSE SPECIALIST

## 2020-12-30 PROCEDURE — 1123F ACP DISCUSS/DSCN MKR DOCD: CPT | Performed by: CLINICAL NURSE SPECIALIST

## 2020-12-30 PROCEDURE — G8427 DOCREV CUR MEDS BY ELIG CLIN: HCPCS | Performed by: CLINICAL NURSE SPECIALIST

## 2020-12-30 PROCEDURE — 3017F COLORECTAL CA SCREEN DOC REV: CPT | Performed by: CLINICAL NURSE SPECIALIST

## 2020-12-30 PROCEDURE — 93000 ELECTROCARDIOGRAM COMPLETE: CPT | Performed by: CLINICAL NURSE SPECIALIST

## 2020-12-30 PROCEDURE — G8484 FLU IMMUNIZE NO ADMIN: HCPCS | Performed by: CLINICAL NURSE SPECIALIST

## 2020-12-30 PROCEDURE — 99214 OFFICE O/P EST MOD 30 MIN: CPT | Performed by: CLINICAL NURSE SPECIALIST

## 2020-12-30 NOTE — PROGRESS NOTES
[address] [place], [address] [address]  Phone: [phone]  Fax: [phone]    OFFICE VISIT:  2020    Lauryn Simmons - : 1950    Reason For Visit:  Deepti Dior is a 69 y.o. male who is here for Follow-up (fu post cath   no cardiac symptoms) and Atrial Fibrillation  Patient was seen initially in October with new finding of A. fib with RVR. Started on beta-blocker and anticoagulated  2D echo obtained showing normal LV size with global dysfunction with EF 40%. Mild concentric LVH with A. fib. No significant valvular disease. Mild TR with elevated RVSP at 39 mmHg. Slightly dilatation of aortic root at 3.8 cm and ascending portion 4.1 cm. Patient underwent nuclear stress test that showed no obvious infarct or ischemia but did show depressed LV function. Recommend heart catheterization    12/3/2020 heart catheterization showed indeterminate grade of mid LAD stenosis. Moderate to severely globally depressed LV function with EF 35%. Recommendations-proceed with cardioversion and medical management of coronary disease and heart failure with reassessment of LV function in 3 months    He is here today in follow-up. He states he is feeling much better. His shortness of breath his pretty much resolved. He has not noticed any recurrent arrhythmia. Subjective  Deepti Dior denies exertional chest pain, shortness of breath, orthopnea, paroxysmal nocturnal dyspnea, syncope, presyncope, arrhythmia, edema and fatigue. The patient denies numbness or weakness to suggest cerebrovascular accident or transient ischemic attack. CHEIKH Lo is PCP and follows labs.   Lauryn Simmons has the following history as recorded in EpicCare:    Patient Active Problem List    Diagnosis Date Noted    Cardiomyopathy [facility])      Priority: High    Atrial fibrillation ([address] 10/14/2020    Essential hypertension 2018    Prostate cancer ([address]     Mixed hyperlipidemia Past Medical History:   Diagnosis Date    Adult general medical exam     CAD (coronary artery disease)     Elevated blood pressure reading     Erectile dysfunction     Hypertension     Mixed hyperlipidemia     Osteoarthritis     Prostate cancer [facility])      Past Surgical History:   Procedure Laterality Date    CARDIAC CATHETERIZATION  2020    EF 35%, moderate diffuse disease in LAD    CARDIOVERSION      DIAGNOSTIC CARDIAC CATH LAB PROCEDURE  2020    HIP SURGERY      KNEE SURGERY      PROSTATECTOMY       Family History   Problem Relation Age of Onset    Hypertension Mother     Coronary Art Dis Mother     Coronary Art Dis Father     Mult Sclerosis Sister      Social History     Tobacco Use    Smoking status: Former Smoker     Packs/day: 0.25     Years: 10.00     Pack years: 2.50     Types: Cigars     Quit date: 1975     Years since quittin.3    Smokeless tobacco: Former User     Types: Chew   Substance Use Topics    Alcohol use: Yes     Comment: social      Current Outpatient Medications   Medication Sig Dispense Refill    apixaban (ELIQUIS) 5 MG TABS tablet Take 1 tablet by mouth 2 times daily 180 tablet 3    amoxicillin (AMOXIL) 500 MG capsule TAKE 4 CAPSULES BY MOUTH 1 HR PRIOR TO DENTAL APPT.  amiodarone (CORDARONE) 200 MG tablet Take 1 tablet by mouth daily Take amiodarone 200 mg twice daily for 10 days and then 200 mg p.o. daily. 100 tablet 5    carvedilol (COREG) 6.25 MG tablet Take 1 tablet by mouth 2 times daily 180 tablet 5    lisinopril (PRINIVIL;ZESTRIL) 10 MG tablet TAKE 1 TABLET DAILY 90 tablet 3    atorvastatin (LIPITOR) 20 MG tablet Take 1 tablet by mouth daily 90 tablet 1    zoster recombinant adjuvanted vaccine (SHINGRIX) 50 MCG/0.5ML SUSR injection Inject 0.5 mLs into the muscle See Admin Instructions 1 dose now and repeat in 2-6 months 1 each 1     No current facility-administered medications for this visit. Cardiovascular  Heart has regular rhythm and rate. No murmurs, rubs or gallops. + pedal pulses and no varicosities. Abdominal -  Soft, nontender, nondistended. Bowel sounds are intact. Extremities - No clubbing, cyanosis, or  edema. Musculoskeletal -  No clubbing . No Osler's nodes. Gait normal .  No kyphosis or scoliosis. Skin -  no statis ulcers or dermatitis. Neurological - No focal signs are identified. Oriented to person, place and time. Psychiatric -  Appropriate affect and mood. Assessment:     Diagnosis Orders   1. Atrial fibrillation, unspecified type (HCC)  EKG 12 lead    ECHO Complete 2D W Doppler W Color    apixaban (ELIQUIS) 5 MG TABS tablet   2. Cardiomyopathy, unspecified type ([address]  ECHO Complete 2D W Doppler W Color   3. Essential hypertension  ECHO Complete 2D W Doppler W Color   4. Coronary artery disease involving native coronary artery of native heart without angina pectoris  ECHO Complete 2D W Doppler W Color     Data:  BP Readings from Last 3 Encounters:   12/30/20 124/84   12/10/20 136/78   12/03/20 (!) 142/76    Pulse Readings from Last 3 Encounters:   12/30/20 53   12/10/20 69   12/03/20 58        Wt Readings from Last 3 Encounters:   12/30/20 247 lb (112 kg)   12/10/20 243 lb 3.2 oz (110.3 kg)   12/03/20 237 lb (107.5 kg)   Reviewed all hospital records and testing    EKG today shows sinus bradycardia with a rate of 53. Old anterolateral infarct noted. Blood pressure and heart rate well controlled. Medical management includes beta-blocker, ACE inhibitor and amiodarone for arrhythmia. Remains anticoagulated on Eliquis    He is feeling much better. We discussed reassessing his LV function in 3 months now that he is on medical management. Prior to being evaluated he was just taking some blood pressure medicine. We discussed in detail atrial fibrillation, medical management coronary disease and depressed LV function. States understanding. States taking medications as prescribed  Stable cardiovascular status. No evidence of overt heart failure, angina or dysrhythmia. Plan  Continue medications and reassess LV function in March  Follow up in March after ECHO  Report any recurrent shortness of breath or arrhythmia   Call with any questions or concerns  Follow up with CHEIKH Mena for non cardiac problems and labs  Report any new problems  Cardiovascular Fitness-Exercise as tolerated. Strive for 30 minutes of exercise most days of the week. Cardiac / Healthy Diet  Continue current medications as directed  Continue plan of treatment  It is always recommended that you bring your medications bottles with you to each visit - this is for your safety! CHEIKH Cho    EMR dragon/transcription disclaimer: Much of this encounter note is electronic transcription/translation of spoken language to printed tach. Electronic translation of spoken language may be erroneous, or at times, nonsensical words or phrases may be inadvertently transcribed.  Although, I have reviewed the note for such errors, some may still exist.

## 2020-12-30 NOTE — PATIENT INSTRUCTIONS
Continue medications and reassess LV function in March  Follow up in March after ECHO  Report any recurrent shortness of breath or arrhythmia   Call with any questions or concerns  Follow up with [address] [place], [place] [address]CHEIKH for non cardiac problems  Report any new problems  Cardiovascular Fitness-Exercise as tolerated. Strive for 30 minutes of exercise most days of the week. Cardiac / Healthy Diet  Continue current medications as directed  Continue plan of treatment  It is always recommended that you bring your medications          bottles with you to each visit - this is for your safety! Patient Education        Learning About Coronary Artery Disease (CAD)  What is coronary artery disease? Coronary artery disease (CAD) occurs when plaque builds up in the arteries that bring oxygen-rich blood to your heart. Plaque is a fatty substance made of cholesterol, calcium, and other substances in the blood. This process is called hardening of the arteries, or atherosclerosis. What happens when you have coronary artery disease? · Plaque may narrow the coronary arteries. Narrowed arteries cause poor blood flow. This can lead to angina symptoms such as chest pain or discomfort. If blood flow is completely blocked, you could have a heart attack. · You can slow CAD and reduce the risk of future problems by making changes in your lifestyle. These include quitting smoking and eating heart-healthy foods. · Treatments for CAD, along with changes in your lifestyle, can help you live a longer and healthier life. How can you prevent coronary artery disease? · Do not smoke. It may be the best thing you can do to prevent heart disease. If you need help quitting, talk to your doctor about stop-smoking programs and medicines. These can increase your chances of quitting for good. · Be active. Get at least 30 minutes of exercise on most days of the week. Walking is a good choice. You also may want to do other activities, such as running, swimming, cycling, or playing tennis or team sports. · Eat heart-healthy foods. Eat more fruits and vegetables and less foods that contain saturated and trans fats. Limit alcohol, sodium, and sweets. · Stay at a healthy weight. Lose weight if you need to. · Manage other health problems such as diabetes, high blood pressure, and high cholesterol. How is coronary artery disease treated? · Your doctor will suggest that you make lifestyle changes. For example, your doctor may ask you to eat healthy foods, quit smoking, lose extra weight, and be more active. · You will have to take medicines. · Your doctor may suggest a procedure to open narrowed or blocked arteries. This is called angioplasty. Or your doctor may suggest using healthy blood vessels to create detours around narrowed or blocked arteries. This is called bypass surgery. Follow-up care is a key part of your treatment and safety. Be sure to make and go to all appointments, and call your doctor if you are having problems. It's also a good idea to know your test results and keep a list of the medicines you take. Where can you learn more? Go to https://AllihubpeSweetgreen.[employer]. org and sign in to your GeoPage account. Enter [phone] in the [facility] box to learn more about \"Learning About Coronary Artery Disease (CAD). \"     If you do not have an account, please click on the \"Sign Up Now\" link. Current as of: December 16, 2019               Content Version: 12.6  © 5239-3183 [employer]. Care instructions adapted under license by [facility] ([facility]). If you have questions about a medical condition or this instruction, always ask your healthcare professional. [address] any warranty or liability for your use of this information. Patient Education        Learning About Atrial Fibrillation  What is atrial fibrillation? Atrial fibrillation (say \"AY-tree-pedro hqq-izhb-GFD-shun\") is the most common type of irregular heartbeat (arrhythmia). Normally, the heart beats in a strong, steady rhythm. In atrial fibrillation, a problem with the heart's electrical system causes the two upper parts of the heart (the atria) to quiver, or fibrillate. Your heart rate also may be faster than normal.  Atrial fibrillation can be dangerous because if the heartbeat isn't strong and steady, blood can collect, or pool, in the atria. And pooled blood is more likely to form clots. Clots can travel to the brain, block blood flow, and cause a stroke. Atrial fibrillation can also lead to heart failure. Treatment for atrial fibrillation helps prevent stroke and heart failure. It also helps relieve symptoms. Atrial fibrillation is often caused by another heart problem. It may happen after heart surgery. It may also be caused by other problems, such as an overactive thyroid gland or lung disease. Many people with atrial fibrillation are able to live full and active lives. What are the symptoms? Some people feel symptoms when they have episodes of atrial fibrillation. But other people don't notice any symptoms. If you have symptoms, you may feel:  · A fluttering, racing, or pounding feeling in your chest called palpitations. · Weak or tired. · Dizzy or lightheaded. · Short of breath. · Chest pain. · Confused. You may notice signs of atrial fibrillation when you check your pulse. Your pulse may seem uneven or fast.  What can you expect when you have atrial fibrillation? At first, spells of atrial fibrillation may come on suddenly and last a short time. It may go away on its own or it goes away after treatment. This is called paroxysmal atrial fibrillation. Over time, the spells may last longer and occur more often. They often don't go away on their own. How is it treated? Treatments can help you feel better and prevent future problems, especially stroke and heart failure. The main types of treatment slow the heart rate, control the heart rhythm, and help prevent stroke. Your treatment will depend on the cause of your atrial fibrillation, your symptoms, and your risk for stroke. · Heart rate treatment. Medicine may be used to slow your heart rate. Your heartbeat may still be irregular. But these medicines keep your heart from beating too fast. They may also help relieve your symptoms. · Heart rhythm treatment. Different treatments may be used to try to stop atrial fibrillation and keep it from returning. They can also relieve symptoms. These treatments include medicine, electrical cardioversion to shock the heart back to a normal rhythm, a procedure called catheter ablation, and heart surgery. · Stroke prevention. You and your doctor can decide how to lower your risk. You may decide to take a blood-thinning medicine called an anticoagulant. How can you live well with it? You can live well and help manage atrial fibrillation by having a heart-healthy lifestyle. This lifestyle may help reduce how often you have episodes of atrial fibrillation. If you are overweight, losing weight can help relieve symptoms. To have a heart-healthy lifestyle:  · Don't smoke. · Eat heart-healthy foods. · Be active. Talk to your doctor about what type and level of exercise is safe for you. · Stay at a healthy weight. Lose weight if you need to. · Avoid alcohol if it triggers symptoms. · Manage other health problems such as high blood pressure, high cholesterol, and diabetes. · Avoid getting sick from the flu. Get a flu shot every year. · Manage stress. Where can you learn more? Go to https://padma.Lexy. org and sign in to your T[employer] account. Enter N738 in the [facility] box to learn more about \"Learning About Atrial Fibrillation. \" If you do not have an account, please click on the \"Sign Up Now\" link. Current as of: December 16, 2019               Content Version: 12.6  © 9277-4650 [employer]. Care instructions adapted under license by [facility] ([facility]). If you have questions about a medical condition or this instruction, always ask your healthcare professional. [address] any warranty or liability for your use of this information.

## 2021-02-22 ENCOUNTER — IMMUNIZATION (OUTPATIENT)
Dept: VACCINE CLINIC | Facility: HOSPITAL | Age: 71
End: 2021-02-22

## 2021-02-22 PROCEDURE — 0011A: CPT | Performed by: OBSTETRICS & GYNECOLOGY

## 2021-02-22 PROCEDURE — 91301 HC SARSCO02 VAC 100MCG/0.5ML IM: CPT | Performed by: OBSTETRICS & GYNECOLOGY

## 2021-03-11 DIAGNOSIS — E78.2 MIXED HYPERLIPIDEMIA: ICD-10-CM

## 2021-03-11 RX ORDER — ATORVASTATIN CALCIUM 20 MG/1
TABLET, FILM COATED ORAL
Qty: 90 TABLET | Refills: 3 | Status: SHIPPED | OUTPATIENT
Start: 2021-03-11 | End: 2021-10-08 | Stop reason: SDUPTHER

## 2021-03-17 ENCOUNTER — HOSPITAL ENCOUNTER (OUTPATIENT)
Dept: NON INVASIVE DIAGNOSTICS | Age: 71
Discharge: HOME | End: 2021-03-17
Payer: MEDICARE

## 2021-03-17 DIAGNOSIS — I48.91 ATRIAL FIBRILLATION, UNSPECIFIED TYPE (HCC): ICD-10-CM

## 2021-03-17 DIAGNOSIS — I42.9 CARDIOMYOPATHY, UNSPECIFIED TYPE (HCC): ICD-10-CM

## 2021-03-17 DIAGNOSIS — I10 ESSENTIAL HYPERTENSION: ICD-10-CM

## 2021-03-17 DIAGNOSIS — I25.10 CORONARY ARTERY DISEASE INVOLVING NATIVE CORONARY ARTERY OF NATIVE HEART WITHOUT ANGINA PECTORIS: ICD-10-CM

## 2021-03-17 LAB
LV EF: 63 %
LVEF MODALITY: NORMAL

## 2021-03-17 PROCEDURE — C8923 2D TTE W OR W/O FOL W/CON,CO: HCPCS

## 2021-03-17 PROCEDURE — 6360000004 HC RX CONTRAST MEDICATION: Performed by: INTERNAL MEDICINE

## 2021-03-17 RX ADMIN — PERFLUTREN 1.65 MG: 6.52 INJECTION, SUSPENSION INTRAVENOUS at 11:38

## 2021-03-22 ENCOUNTER — IMMUNIZATION (OUTPATIENT)
Dept: VACCINE CLINIC | Facility: HOSPITAL | Age: 71
End: 2021-03-22

## 2021-03-22 PROCEDURE — 91301 HC SARSCO02 VAC 100MCG/0.5ML IM: CPT | Performed by: OBSTETRICS & GYNECOLOGY

## 2021-03-22 PROCEDURE — 0012A: CPT | Performed by: OBSTETRICS & GYNECOLOGY

## 2021-03-31 ENCOUNTER — OFFICE VISIT (OUTPATIENT)
Dept: CARDIOLOGY CLINIC | Age: 71
End: 2021-03-31
Payer: MEDICARE

## 2021-03-31 VITALS
SYSTOLIC BLOOD PRESSURE: 134 MMHG | HEIGHT: 75 IN | WEIGHT: 246 LBS | DIASTOLIC BLOOD PRESSURE: 80 MMHG | HEART RATE: 64 BPM | BODY MASS INDEX: 30.59 KG/M2

## 2021-03-31 DIAGNOSIS — I42.8 OTHER CARDIOMYOPATHY (HCC): ICD-10-CM

## 2021-03-31 DIAGNOSIS — I10 ESSENTIAL HYPERTENSION: ICD-10-CM

## 2021-03-31 DIAGNOSIS — I25.10 CORONARY ARTERY DISEASE INVOLVING NATIVE CORONARY ARTERY OF NATIVE HEART WITHOUT ANGINA PECTORIS: Primary | ICD-10-CM

## 2021-03-31 DIAGNOSIS — I48.0 PAROXYSMAL ATRIAL FIBRILLATION (HCC): ICD-10-CM

## 2021-03-31 DIAGNOSIS — Z79.899 ON AMIODARONE THERAPY: ICD-10-CM

## 2021-03-31 PROCEDURE — G8484 FLU IMMUNIZE NO ADMIN: HCPCS | Performed by: CLINICAL NURSE SPECIALIST

## 2021-03-31 PROCEDURE — 1123F ACP DISCUSS/DSCN MKR DOCD: CPT | Performed by: CLINICAL NURSE SPECIALIST

## 2021-03-31 PROCEDURE — 99214 OFFICE O/P EST MOD 30 MIN: CPT | Performed by: CLINICAL NURSE SPECIALIST

## 2021-03-31 PROCEDURE — 3017F COLORECTAL CA SCREEN DOC REV: CPT | Performed by: CLINICAL NURSE SPECIALIST

## 2021-03-31 PROCEDURE — 4040F PNEUMOC VAC/ADMIN/RCVD: CPT | Performed by: CLINICAL NURSE SPECIALIST

## 2021-03-31 PROCEDURE — 1036F TOBACCO NON-USER: CPT | Performed by: CLINICAL NURSE SPECIALIST

## 2021-03-31 PROCEDURE — G8417 CALC BMI ABV UP PARAM F/U: HCPCS | Performed by: CLINICAL NURSE SPECIALIST

## 2021-03-31 PROCEDURE — G8427 DOCREV CUR MEDS BY ELIG CLIN: HCPCS | Performed by: CLINICAL NURSE SPECIALIST

## 2021-03-31 NOTE — PATIENT INSTRUCTIONS
Labs and chest x ray in about a month  Follow up in 6 mos With Dr. Dana Durbin   Call with any questions or concerns  Follow up with CHEIKH Medrano for non cardiac problems  Report any new problems  Cardiovascular Fitness-Exercise as tolerated. Strive for 30 minutes of exercise most days of the week. Cardiac / Healthy Diet  Continue current medications as directed  Continue plan of treatment  It is always recommended that you bring your medications bottles with you to each visit - this is for your safety! Let us know if you have any recurrent problems!

## 2021-03-31 NOTE — PROGRESS NOTES
[id number] [facility]  [address] [place] [address]  Phone: [phone]  Fax: [phone]    OFFICE VISIT:  3/31/2021    [address]: 1950    Reason For Visit:  Avis Guardado is a 69 y.o. male who is here for Follow-up (no cardiac symptoms) and Atrial Fibrillation  Patient was seen initially in October with new finding of A. fib with RVR. Started on beta-blocker and anticoagulated  2D echo obtained showing normal LV size with global dysfunction with EF 40%. Mild concentric LVH with A. fib. No significant valvular disease. Mild TR with elevated RVSP at 39 mmHg. Slightly dilatation of aortic root at 3.8 cm and ascending portion 4.1 cm. Patient underwent nuclear stress test that showed no obvious infarct or ischemia but did show depressed LV function. Recommend heart catheterization     12/3/2020 heart catheterization showed indeterminate grade of mid LAD stenosis. Moderate to severely globally depressed LV function with EF 35%. Recommendations-proceed with cardioversion and medical management of coronary disease and heart failure with reassessment of LV function in 3 months    Repeat 2D echo 3/17/2021 that showed mild concentric LVH. LV function has improved with EF 60 to 65%. Is here today in follow-up. He states overall he is feeling well. He is not had had any recurrent symptoms of arrhythmia, chest pain or significant shortness of breath. Get a little winded the other day walking up a hill but recovered quickly.         Subjective  Avis Guardado denies exertional chest pain, shortness of breath, orthopnea, paroxysmal nocturnal dyspnea, syncope, presyncope, arrhythmia, edema and fatigue. The patient denies numbness or weakness to suggest cerebrovascular accident or transient ischemic attack. CHEIKH Medrano is PCP.   Dayna Dom has the following history as recorded in [facility]:    Patient Active Problem List    Diagnosis Date Noted    Cardiomyopathy [facility]) Priority: High    Atrial fibrillation ([facility] [address] 10/14/2020    Essential hypertension 2018    Prostate cancer ([facility] [address]     Mixed hyperlipidemia      Past Medical History:   Diagnosis Date    Adult general medical exam     CAD (coronary artery disease)     Elevated blood pressure reading     Erectile dysfunction     Hypertension     Mixed hyperlipidemia     Osteoarthritis     Prostate cancer [facility])      Past Surgical History:   Procedure Laterality Date    CARDIAC CATHETERIZATION  2020    EF 35%, moderate diffuse disease in LAD    CARDIOVERSION      DIAGNOSTIC CARDIAC CATH LAB PROCEDURE  2020    HIP SURGERY      KNEE SURGERY      PROSTATECTOMY       Family History   Problem Relation Age of Onset    Hypertension Mother     Coronary Art Dis Mother     Coronary Art Dis Father     Mult Sclerosis Sister      Social History     Tobacco Use    Smoking status: Former Smoker     Packs/day: 0.25     Years: 10.00     Pack years: 2.50     Types: Cigars     Quit date: 1975     Years since quittin.6    Smokeless tobacco: Former User     Types: Chew   Substance Use Topics    Alcohol use: Yes     Comment: social      Current Outpatient Medications   Medication Sig Dispense Refill    atorvastatin (LIPITOR) 20 MG tablet TAKE 1 TABLET DAILY 90 tablet 3    apixaban (ELIQUIS) 5 MG TABS tablet Take 1 tablet by mouth 2 times daily 180 tablet 3    amoxicillin (AMOXIL) 500 MG capsule TAKE 4 CAPSULES BY MOUTH 1 HR PRIOR TO DENTAL APPT.  amiodarone (CORDARONE) 200 MG tablet Take 1 tablet by mouth daily Take amiodarone 200 mg twice daily for 10 days and then 200 mg p.o. daily.  100 tablet 5    carvedilol (COREG) 6.25 MG tablet Take 1 tablet by mouth 2 times daily 180 tablet 5    lisinopril (PRINIVIL;ZESTRIL) 10 MG tablet TAKE 1 TABLET DAILY 90 tablet 3    zoster recombinant adjuvanted vaccine (SHINGRIX) 50 MCG/0.5ML SUSR injection Inject 0.5 mLs into the muscle See Admin Instructions 1 Normal effort without use of accessory muscles. Cardiovascular  Heart has regular rhythm and rate. No murmurs, rubs or gallops. + pedal pulses and no varicosities. Abdominal -  Soft, nontender, nondistended. Bowel sounds are intact. Extremities - No clubbing, cyanosis, or  edema. Musculoskeletal -  No clubbing . No Osler's nodes. Gait normal .  No kyphosis or scoliosis. Skin - no statis ulcers or dermatitis. Neurological - No focal signs are identified. Oriented to person, place and time. Psychiatric -  Appropriate affect and mood. Assessment:     Diagnosis Orders   1. Coronary artery disease involving native coronary artery of native heart without angina pectoris     2. Other cardiomyopathy ([facility] [address]     3. Paroxysmal atrial fibrillation ([facility] [address]     4. Essential hypertension     5. On amiodarone therapy  TSH without Reflex    T4, Free    Comprehensive Metabolic Panel    XR CHEST (2 VW)     Data:  BP Readings from Last 3 Encounters:   03/31/21 134/80   12/30/20 124/84   12/10/20 136/78    Pulse Readings from Last 3 Encounters:   03/31/21 64   12/30/20 53   12/10/20 69        Wt Readings from Last 3 Encounters:   03/31/21 246 lb (111.6 kg)   12/30/20 247 lb (112 kg)   12/10/20 243 lb 3.2 oz (110.3 kg)   Blood pressure and heart rate well controlled  Improvement in LV function with medical management and return to sinus rhythm. Currently on amiodarone for arrhythmia. Remains anticoagulated on Eliquis. On beta-blocker, ACE inhibitor and statin  Weight is stable and no signs of fluid overload. Being on amiodarone will need to get labs in about a month including CMP and thyroid panel. Also will get chest x-ray. We will give him orders. We discussed signs and symptoms to report of arrhythmia or failure. We will continue same medications and see him back in about 6 months    Primary care follow-up was scheduled for tomorrow for repeat labs.   Will forward to primary care provider as he may want to bump this out    States taking medications as prescribed  Stable cardiovascular status. No evidence of overt heart failure, angina or dysrhythmia. Plan    Labs and chest x ray in about a month  Follow up in 6 mos With Dr. Allen Court   Call with any questions or concerns  Follow up with CHEIKH Carreno for non cardiac problems  Report any new problems  Cardiovascular Fitness-Exercise as tolerated. Strive for 30 minutes of exercise most days of the week. Cardiac / Healthy Diet  Continue current medications as directed  Continue plan of treatment  It is always recommended that you bring your medications bottles with you to each visit - this is for your safety! CHEIKH Fink    EMR dragon/transcription disclaimer: Much of this encounter note is electronic transcription/translation of spoken language to printed tach. Electronic translation of spoken language may be erroneous, or at times, nonsensical words or phrases may be inadvertently transcribed.  Although, I have reviewed the note for such errors, some may still exist.

## 2021-04-19 DIAGNOSIS — I48.91 ATRIAL FIBRILLATION, UNSPECIFIED TYPE (HCC): ICD-10-CM

## 2021-04-20 ENCOUNTER — TELEPHONE (OUTPATIENT)
Dept: CARDIOLOGY CLINIC | Age: 71
End: 2021-04-20

## 2021-09-27 ENCOUNTER — HOSPITAL ENCOUNTER (OUTPATIENT)
Dept: GENERAL RADIOLOGY | Age: 71
Discharge: HOME | End: 2021-09-27
Payer: MEDICARE

## 2021-09-27 DIAGNOSIS — Z79.899 ON AMIODARONE THERAPY: ICD-10-CM

## 2021-09-27 PROCEDURE — 71046 X-RAY EXAM CHEST 2 VIEWS: CPT

## 2021-09-30 ENCOUNTER — OFFICE VISIT (OUTPATIENT)
Dept: CARDIOLOGY CLINIC | Age: 71
End: 2021-09-30
Payer: MEDICARE

## 2021-09-30 VITALS
WEIGHT: 238 LBS | DIASTOLIC BLOOD PRESSURE: 64 MMHG | HEIGHT: 75 IN | HEART RATE: 60 BPM | SYSTOLIC BLOOD PRESSURE: 112 MMHG | BODY MASS INDEX: 29.59 KG/M2

## 2021-09-30 DIAGNOSIS — I48.0 PAROXYSMAL ATRIAL FIBRILLATION (HCC): ICD-10-CM

## 2021-09-30 DIAGNOSIS — I25.10 CORONARY ARTERY DISEASE INVOLVING NATIVE CORONARY ARTERY OF NATIVE HEART WITHOUT ANGINA PECTORIS: Primary | ICD-10-CM

## 2021-09-30 DIAGNOSIS — I42.8 OTHER CARDIOMYOPATHY (HCC): ICD-10-CM

## 2021-09-30 DIAGNOSIS — I10 ESSENTIAL HYPERTENSION: ICD-10-CM

## 2021-09-30 DIAGNOSIS — Z79.899 ON AMIODARONE THERAPY: ICD-10-CM

## 2021-09-30 PROCEDURE — 3017F COLORECTAL CA SCREEN DOC REV: CPT | Performed by: CLINICAL NURSE SPECIALIST

## 2021-09-30 PROCEDURE — 1123F ACP DISCUSS/DSCN MKR DOCD: CPT | Performed by: CLINICAL NURSE SPECIALIST

## 2021-09-30 PROCEDURE — 4040F PNEUMOC VAC/ADMIN/RCVD: CPT | Performed by: CLINICAL NURSE SPECIALIST

## 2021-09-30 PROCEDURE — G8417 CALC BMI ABV UP PARAM F/U: HCPCS | Performed by: CLINICAL NURSE SPECIALIST

## 2021-09-30 PROCEDURE — 99214 OFFICE O/P EST MOD 30 MIN: CPT | Performed by: CLINICAL NURSE SPECIALIST

## 2021-09-30 PROCEDURE — G8427 DOCREV CUR MEDS BY ELIG CLIN: HCPCS | Performed by: CLINICAL NURSE SPECIALIST

## 2021-09-30 PROCEDURE — 1036F TOBACCO NON-USER: CPT | Performed by: CLINICAL NURSE SPECIALIST

## 2021-09-30 NOTE — PATIENT INSTRUCTIONS
If going to have dental extractions then hold your Eliquis for 2 days prior   Follow up in 6-9 mos With Dr. Gabo Willis   Call with any questions or concerns  Follow up with CHEIKH Cazares for non cardiac problems  Report any new problems  Cardiovascular Fitness-Exercise as tolerated. Strive for 30 minutes of exercise most days of the week. Cardiac / Healthy Diet  Continue current medications as directed  Continue plan of treatment  It is always recommended that you bring your medications bottles with you to each visit - this is for your safety!

## 2021-09-30 NOTE — PROGRESS NOTES
[facility]  [facility] [address]  [address]  Phone: [phone]  Fax: [phone]    OFFICE VISIT:  9/30/2021    [address]: 1950    Reason For Visit:  Jose Tsang is a 66 y.o. male who is here for Coronary Artery Disease (No cardiac symptoms today.), Atrial Fibrillation, and Cardiomyopathy  Patient was seen initially in October with new finding of A. fib with RVR.  Started on beta-blocker and anticoagulated  2D echo obtained showing normal LV size with global dysfunction with EF 40%.  Mild concentric LVH with A. fib.  No significant valvular disease.  Mild TR with elevated RVSP at 39 mmHg.  Slightly dilatation of aortic root at 3.8 cm and ascending portion 4.1 cm. Patient underwent nuclear stress test that showed no obvious infarct or ischemia but did show depressed LV function.  Recommend heart catheterization     12/3/2020 heart catheterization showed indeterminate grade of mid LAD stenosis.  Moderate to severely globally depressed LV function with EF 35%. Recommendations-proceed with cardioversion and medical management of coronary disease and heart failure with reassessment of LV function in 3 months     Repeat 2D echo 3/17/2021 that showed mild concentric LVH. LV function has improved with EF 60 to 65%. Subjective  Jose Tsang denies exertional chest pain, shortness of breath, orthopnea, paroxysmal nocturnal dyspnea, syncope, presyncope, arrhythmia, edema and fatigue. The patient denies numbness or weakness to suggest cerebrovascular accident or transient ischemic attack. CHEIKH Neves is PCP .   Claudetta Kemps has the following history as recorded in [facility]:    Patient Active Problem List    Diagnosis Date Noted    Cardiomyopathy [facility])     Atrial fibrillation ([facility] [address] 10/14/2020    Essential hypertension 03/13/2018    Prostate cancer ([facility] [address]     Mixed hyperlipidemia      Past Medical History:   Diagnosis Date    Adult general medical exam     CAD (coronary artery disease)     Elevated blood pressure reading     Erectile dysfunction     Hypertension     Mixed hyperlipidemia     Osteoarthritis     Prostate cancer [facility])      Past Surgical History:   Procedure Laterality Date    CARDIAC CATHETERIZATION  2020    EF 35%, moderate diffuse disease in LAD    CARDIOVERSION      DIAGNOSTIC CARDIAC CATH LAB PROCEDURE  2020    HIP SURGERY      KNEE SURGERY      PROSTATECTOMY       Family History   Problem Relation Age of Onset    Hypertension Mother     Coronary Art Dis Mother     Coronary Art Dis Father     Mult Sclerosis Sister      Social History     Tobacco Use    Smoking status: Former Smoker     Packs/day: 0.25     Years: 10.00     Pack years: 2.50     Types: Cigars     Quit date: 1975     Years since quittin.1    Smokeless tobacco: Former User     Types: Chew   Substance Use Topics    Alcohol use: Yes     Comment: social      Current Outpatient Medications   Medication Sig Dispense Refill    apixaban (ELIQUIS) 5 MG TABS tablet Take 1 tablet by mouth 2 times daily 14 tablet 0    atorvastatin (LIPITOR) 20 MG tablet TAKE 1 TABLET DAILY 90 tablet 3    amiodarone (CORDARONE) 200 MG tablet Take 1 tablet by mouth daily Take amiodarone 200 mg twice daily for 10 days and then 200 mg p.o. daily. (Patient taking differently: Take 200 mg by mouth daily ) 100 tablet 5    carvedilol (COREG) 6.25 MG tablet Take 1 tablet by mouth 2 times daily 180 tablet 5    lisinopril (PRINIVIL;ZESTRIL) 10 MG tablet TAKE 1 TABLET DAILY 90 tablet 3     No current facility-administered medications for this visit. Allergies: Patient has no known allergies. Review of Systems  Constitutional  no significant activity change, appetite change, or unexpected weight change. No fever, chills or diaphoresis. No fatigue. HEENT  no significant rhinorrhea or epistaxis. No tinnitus or significant hearing loss.    Eyes  no sudden vision change or amaurosis. Respiratory  no significant wheezing, stridor, apnea or cough. No dyspnea on exertion or shortness of breath. Cardiovascular  no exertional chest pain, orthopnea or PND. No sensation of arrhythmia or slow heart rate. No claudication or leg edema. Gastrointestinal  no abdominal swelling or pain. No blood in stool. No severe constipation, diarrhea, nausea, or vomiting. Genitourinary  no difficulty urinating, dysuria, frequency, or urgency. No flank pain or hematuria. Musculoskeletal  no back pain, gait disturbance, or myalgia. Skin  no color change or rash. No pallor. No new surgical incision. Neurologic  no speech difficulty, facial asymmetry or lateralizing weakness. No seizures, presyncope, syncope, or significant dizziness. Hematologic  no easy bruising or excessive bleeding. Psychiatric  no severe anxiety or insomnia. No confusion. All other review of systems are negative. Objective  Vital Signs - /64   Pulse 60   Ht 6' 3\" (1.905 m)   Wt 238 lb (108 kg)   BMI 29.75 kg/m²   General - [facility] is alert, cooperative, and pleasant. Well groomed. No acute distress. Body habitus is overweight. HEENT  The head is normocephalic. No circumoral cyanosis. Dentition is normal.   EYES -  No Xanthelasma, no arcus senilis, no conjunctival hemorrhages or discharge. Neck - Supple, without increased jugular venous pressures. No carotid bruits. No mass. Respiratory - Lungs are clear bilaterally. No wheezes or rales. Normal effort without use of accessory muscles. Cardiovascular  Heart has regular rhythm and rate. No murmurs, rubs or gallops. + pedal pulses and no varicosities. Abdominal -  Soft, nontender, nondistended. Bowel sounds are intact. Extremities - No clubbing, cyanosis, or  edema. Musculoskeletal -  No clubbing . No Osler's nodes. Gait normal .  No kyphosis or scoliosis. Skin -  no statis ulcers or dermatitis.   Neurological - No focal signs are identified. Oriented to person, place and time. Psychiatric -  Appropriate affect and mood. Assessment:     Diagnosis Orders   1. Coronary artery disease involving native coronary artery of native heart without angina pectoris     2. Paroxysmal atrial fibrillation (HCC)     3. Other cardiomyopathy ([facility] [address]     4. Essential hypertension     5. On amiodarone therapy       Data:  BP Readings from Last 3 Encounters:   09/30/21 112/64   03/31/21 134/80   12/30/20 124/84    Pulse Readings from Last 3 Encounters:   09/30/21 60   03/31/21 64   12/30/20 53        Wt Readings from Last 3 Encounters:   09/30/21 238 lb (108 kg)   03/31/21 246 lb (111.6 kg)   12/30/20 247 lb (112 kg)      Reviewed PCP recent notes   Reviewed recent labs  Lab Results   Component Value Date     09/27/2021    K 4.7 09/27/2021     09/27/2021    CO2 24 09/27/2021    BUN 13 09/27/2021    CREATININE 1.0 09/27/2021    GLUCOSE 112 (H) 09/27/2021    CALCIUM 9.3 09/27/2021    PROT 6.9 09/27/2021    LABALBU 4.4 09/27/2021    BILITOT 0.9 09/27/2021    ALKPHOS 75 09/27/2021    AST 17 09/27/2021    ALT 19 09/27/2021    LABGLOM >60 09/27/2021    GFRAA >59 09/27/2021     Lab Results   Component Value Date    TSH 1.550 09/27/2021   Free T4     1.68                9/27/2021     CXR - stable     Blood pressure heart rate well controlled. Remains on beta-blocker, ACE inhibitor and amiodarone for arrhythmia. Anticoagulated on Eliquis and on statin     States taking medications as prescribed  Stable cardiovascular status. No evidence of overt heart failure, angina or dysrhythmia. 30 minutes were spent preparing, reviewing and seeing patient.   All questions answered    Plan  If going to have dental extractions then hold your Eliquis for 2 days prior   Follow up in 6-9 mos With Dr. Pablo Matthew   Call with any questions or concerns  Follow up with CHEIKH Quintero for non cardiac problems  Report any new problems  Cardiovascular Fitness-Exercise as tolerated. Strive for 30 minutes of exercise most days of the week. Cardiac / Healthy Diet  Continue current medications as directed  Continue plan of treatment  It is always recommended that you bring your medications bottles with you to each visit - this is for your safety! CHEIKH Benítez dragon/transcription disclaimer: Much of this encounter note is electronic transcription/translation of spoken language to printed tach. Electronic translation of spoken language may be erroneous, or at times, nonsensical words or phrases may be inadvertently transcribed.  Although, I have reviewed the note for such errors, some may still exist.

## 2021-10-04 ENCOUNTER — APPOINTMENT (OUTPATIENT)
Dept: MRI IMAGING | Age: 71
End: 2021-10-04
Payer: MEDICARE

## 2021-10-04 ENCOUNTER — APPOINTMENT (OUTPATIENT)
Dept: CT IMAGING | Age: 71
End: 2021-10-04
Payer: MEDICARE

## 2021-10-04 ENCOUNTER — HOSPITAL ENCOUNTER (EMERGENCY)
Age: 71
Discharge: HOME | End: 2021-10-05
Attending: EMERGENCY MEDICINE
Payer: MEDICARE

## 2021-10-04 VITALS
TEMPERATURE: 98.2 F | OXYGEN SATURATION: 93 % | HEART RATE: 53 BPM | SYSTOLIC BLOOD PRESSURE: 151 MMHG | DIASTOLIC BLOOD PRESSURE: 83 MMHG | RESPIRATION RATE: 17 BRPM

## 2021-10-04 DIAGNOSIS — H53.9 VISUAL DISTURBANCE: Primary | ICD-10-CM

## 2021-10-04 DIAGNOSIS — H34.11 CENTRAL RETINAL ARTERY OCCLUSION OF RIGHT EYE: ICD-10-CM

## 2021-10-04 LAB
ALBUMIN SERPL-MCNC: 4.4 G/DL (ref 3.5–5.2)
ALP BLD-CCNC: 85 U/L (ref 40–130)
ALT SERPL-CCNC: 15 U/L (ref 5–41)
ANION GAP SERPL CALCULATED.3IONS-SCNC: 10 MMOL/L (ref 7–19)
APTT: 33.8 SEC (ref 26–36.2)
AST SERPL-CCNC: 16 U/L (ref 5–40)
BASOPHILS ABSOLUTE: 0 K/UL (ref 0–0.2)
BASOPHILS RELATIVE PERCENT: 0.6 % (ref 0–1)
BILIRUB SERPL-MCNC: 0.9 MG/DL (ref 0.2–1.2)
BUN BLDV-MCNC: 17 MG/DL (ref 8–23)
CALCIUM SERPL-MCNC: 9.1 MG/DL (ref 8.8–10.2)
CHLORIDE BLD-SCNC: 106 MMOL/L (ref 98–111)
CO2: 27 MMOL/L (ref 22–29)
CREAT SERPL-MCNC: 0.9 MG/DL (ref 0.5–1.2)
EOSINOPHILS ABSOLUTE: 0.1 K/UL (ref 0–0.6)
EOSINOPHILS RELATIVE PERCENT: 1.3 % (ref 0–5)
GFR AFRICAN AMERICAN: >59
GFR NON-AFRICAN AMERICAN: >60
GLUCOSE BLD-MCNC: 128 MG/DL (ref 70–99)
GLUCOSE BLD-MCNC: 96 MG/DL (ref 74–109)
HCT VFR BLD CALC: 44.8 % (ref 42–52)
HEMOGLOBIN: 14.6 G/DL (ref 14–18)
IMMATURE GRANULOCYTES #: 0 K/UL
INR BLD: 1.26 (ref 0.88–1.18)
LYMPHOCYTES ABSOLUTE: 1.5 K/UL (ref 1.1–4.5)
LYMPHOCYTES RELATIVE PERCENT: 28 % (ref 20–40)
MCH RBC QN AUTO: 32.4 PG (ref 27–31)
MCHC RBC AUTO-ENTMCNC: 32.6 G/DL (ref 33–37)
MCV RBC AUTO: 99.3 FL (ref 80–94)
MONOCYTES ABSOLUTE: 0.5 K/UL (ref 0–0.9)
MONOCYTES RELATIVE PERCENT: 10 % (ref 0–10)
NEUTROPHILS ABSOLUTE: 3.1 K/UL (ref 1.5–7.5)
NEUTROPHILS RELATIVE PERCENT: 59.7 % (ref 50–65)
PDW BLD-RTO: 13.1 % (ref 11.5–14.5)
PERFORMED ON: ABNORMAL
PLATELET # BLD: 164 K/UL (ref 130–400)
PMV BLD AUTO: 11.6 FL (ref 9.4–12.4)
POTASSIUM REFLEX MAGNESIUM: 4.7 MMOL/L (ref 3.5–5)
PROTHROMBIN TIME: 15.9 SEC (ref 12–14.6)
RBC # BLD: 4.51 M/UL (ref 4.7–6.1)
SODIUM BLD-SCNC: 143 MMOL/L (ref 136–145)
TOTAL PROTEIN: 6.9 G/DL (ref 6.6–8.7)
WBC # BLD: 5.2 K/UL (ref 4.8–10.8)

## 2021-10-04 PROCEDURE — 70496 CT ANGIOGRAPHY HEAD: CPT

## 2021-10-04 PROCEDURE — A9577 INJ MULTIHANCE: HCPCS | Performed by: PHYSICIAN ASSISTANT

## 2021-10-04 PROCEDURE — 6360000004 HC RX CONTRAST MEDICATION: Performed by: PHYSICIAN ASSISTANT

## 2021-10-04 PROCEDURE — 99284 EMERGENCY DEPT VISIT MOD MDM: CPT

## 2021-10-04 PROCEDURE — 85610 PROTHROMBIN TIME: CPT

## 2021-10-04 PROCEDURE — 70553 MRI BRAIN STEM W/O & W/DYE: CPT

## 2021-10-04 PROCEDURE — 80053 COMPREHEN METABOLIC PANEL: CPT

## 2021-10-04 PROCEDURE — 85730 THROMBOPLASTIN TIME PARTIAL: CPT

## 2021-10-04 PROCEDURE — 85025 COMPLETE CBC W/AUTO DIFF WBC: CPT

## 2021-10-04 PROCEDURE — 70498 CT ANGIOGRAPHY NECK: CPT

## 2021-10-04 PROCEDURE — 36415 COLL VENOUS BLD VENIPUNCTURE: CPT

## 2021-10-04 PROCEDURE — 82947 ASSAY GLUCOSE BLOOD QUANT: CPT

## 2021-10-04 PROCEDURE — 93005 ELECTROCARDIOGRAM TRACING: CPT | Performed by: PHYSICIAN ASSISTANT

## 2021-10-04 PROCEDURE — 70450 CT HEAD/BRAIN W/O DYE: CPT

## 2021-10-04 RX ADMIN — IOPAMIDOL 90 ML: 755 INJECTION, SOLUTION INTRAVENOUS at 14:58

## 2021-10-04 RX ADMIN — GADOBENATE DIMEGLUMINE 20 ML: 529 INJECTION, SOLUTION INTRAVENOUS at 18:01

## 2021-10-04 ASSESSMENT — ENCOUNTER SYMPTOMS
EYE DISCHARGE: 0
EYE ITCHING: 0
SHORTNESS OF BREATH: 0
APNEA: 0
COUGH: 0
PHOTOPHOBIA: 0
COLOR CHANGE: 0
BACK PAIN: 0

## 2021-10-04 NOTE — ED PROVIDER NOTES
Attending Supervisory Note/Shared Visit    I have reviewed the mid-levels findings and agree. We have discussed the case I am in agreement with the plan and work-up. My shift will end prior to the patient's diagnostic data being compiled.   We have discussed appropriate follow-up and referral.    Twan Álvarez MD  Attending Emergency Physician        Pradip Chang MD  10/08/21 4513

## 2021-10-04 NOTE — ED PROVIDER NOTES
[facility] [place]  eMERGENCYdEPARTMENT eNCOUnter      Pt Name: Lai Burger  MRN: [id number]  [place] 1950  Date of evaluation: 10/4/2021  Provider:CATHERINE Sandra    CHIEF COMPLAINT       Chief Complaint   Patient presents with    Loss of Consciousness         HISTORY OF PRESENT ILLNESS  (Location/Symptom, Timing/Onset, Context/Setting, Quality, Duration, Modifying Factors, Severity.)   Lai Burger is a 66 y.o. male who presents to the emergency department with complaints of right vision loss field acute onset yesterday 24hrs plus out on eliquis he has HTN here going to get that resolved. Sent here by optometry for stroke rule out. Saw Dr Se Burdick states Yani Rodriguez said I had a bloody spot on my artery\" states he was dilated and viewed in clinic. No other complaints or deficits. Patient endorses fall Friday where he struck his head on the ground but this visual impairment did not start till yesterday. He tells me the fall was witnessed. Mechanical is what was endorsed. He states he felt fine following the fall. No arthralgias or any painful joints today. HPI    Nursing Notes were reviewed and I agree. REVIEW OF SYSTEMS    (2-9 systems for level 4, 10 or more for level 5)     Review of Systems   Constitutional: Negative for activity change, appetite change, chills and fever. HENT: Negative for congestion and dental problem. Eyes: Positive for visual disturbance. Negative for photophobia, discharge and itching. Respiratory: Negative for apnea, cough and shortness of breath. Cardiovascular: Negative for chest pain. Musculoskeletal: Negative for arthralgias, back pain, gait problem, myalgias and neck pain. Skin: Negative for color change, pallor and rash. Neurological: Negative for dizziness, seizures and syncope. Psychiatric/Behavioral: Negative for agitation. The patient is not nervous/anxious.          Except as noted above the remainder of the review of systems was reviewed and negative. PAST MEDICAL HISTORY     Past Medical History:   Diagnosis Date    Adult general medical exam     CAD (coronary artery disease)     Elevated blood pressure reading     Erectile dysfunction     Hypertension     Mixed hyperlipidemia     Osteoarthritis     Prostate cancer ([facility] [address]          SURGICAL HISTORY       Past Surgical History:   Procedure Laterality Date    CARDIAC CATHETERIZATION  12/03/2020    EF 35%, moderate diffuse disease in LAD    CARDIOVERSION      DIAGNOSTIC CARDIAC CATH LAB PROCEDURE  12/03/2020    HIP SURGERY      KNEE SURGERY      PROSTATECTOMY           CURRENT MEDICATIONS       Discharge Medication List as of 10/4/2021 11:17 PM      CONTINUE these medications which have NOT CHANGED    Details   apixaban (ELIQUIS) 5 MG TABS tablet Take 1 tablet by mouth 2 times daily, Disp-14 tablet, R-0One week supply till mail order gets to himNormal      atorvastatin (LIPITOR) 20 MG tablet TAKE 1 TABLET DAILY, Disp-90 tablet, R-3Normal      amiodarone (CORDARONE) 200 MG tablet Take 1 tablet by mouth daily Take amiodarone 200 mg twice daily for 10 days and then 200 mg p.o. daily. , Disp-100 tablet,R-5Normal      carvedilol (COREG) 6.25 MG tablet Take 1 tablet by mouth 2 times daily, Disp-180 tablet,R-5Normal      lisinopril (PRINIVIL;ZESTRIL) 10 MG tablet TAKE 1 TABLET DAILY, Disp-90 tablet,R-3Normal             ALLERGIES     Patient has no known allergies.     FAMILY HISTORY       Family History   Problem Relation Age of Onset    Hypertension Mother     Coronary Art Dis Mother     Coronary Art Dis Father     Mult Sclerosis Sister           SOCIAL HISTORY       Social History     Socioeconomic History    Marital status:      Spouse name: Not on file    Number of children: Not on file    Years of education: Not on file    Highest education level: Not on file   Occupational History    Not on file   Tobacco Use    Smoking status: Former Smoker     Packs/day: 0.25     Years: 10.00     Pack years: 2.50     Types: Cigars     Quit date: 1975     Years since quittin.1    Smokeless tobacco: Former User     Types: Chew   Vaping Use    Vaping Use: Never used   Substance and Sexual Activity    Alcohol use: Yes     Comment: social    Drug use: No    Sexual activity: Not on file   Other Topics Concern    Not on file   Social History Narrative    Not on file     Social Determinants of Health     Financial Resource Strain:     Difficulty of Paying Living Expenses:    Food Insecurity:     Worried About Running Out of Food in the Last Year:     [address] in the Last Year:    Transportation Needs:     Lack of Transportation (Medical):  Lack of Transportation (Non-Medical):    Physical Activity:     Days of Exercise per Week:     Minutes of Exercise per Session:    Stress:     Feeling of Stress :    Social Connections:     Frequency of Communication with Friends and Family:     Frequency of Social Gatherings with Friends and Family:     Attends Jew Services:     Active Member of Clubs or Organizations:     Attends Club or Organization Meetings:     Marital Status:    Intimate Partner Violence:     Fear of Current or Ex-Partner:     Emotionally Abused:     Physically Abused:     Sexually Abused:        SCREENINGS           PHYSICAL EXAM    (up to 7 forlevel 4, 8 or more for level 5)     ED Triage Vitals   BP Temp Temp src Pulse Resp SpO2 Height Weight   -- -- -- -- -- -- -- --       Physical Exam  Constitutional:       General: He is not in acute distress. Appearance: He is well-developed. He is not diaphoretic. HENT:      Head: Normocephalic and atraumatic. Right Ear: External ear normal.      Left Ear: External ear normal.   Eyes:      General: Visual field deficit present. Extraocular Movements: Extraocular movements intact.       Conjunctiva/sclera: Conjunctivae normal.      Comments: Right pupil suspect no constriction on exam normal fundus   Neck:      Trachea: No tracheal deviation. Cardiovascular:      Rate and Rhythm: Normal rate and regular rhythm. Heart sounds: Normal heart sounds. No murmur heard. Pulmonary:      Effort: Pulmonary effort is normal.      Breath sounds: Normal breath sounds. No stridor. No wheezing. Chest:      Chest wall: No tenderness. Abdominal:      General: Bowel sounds are normal. There is no distension. Palpations: Abdomen is soft. Tenderness: There is no abdominal tenderness. Musculoskeletal:         General: Normal range of motion. Cervical back: Normal range of motion and neck supple. Skin:     General: Skin is warm and dry. Capillary Refill: Capillary refill takes less than 2 seconds. Neurological:      General: No focal deficit present. Mental Status: He is alert and oriented to person, place, and time. Mental status is at baseline. GCS: GCS eye subscore is 4. GCS verbal subscore is 5. GCS motor subscore is 6. Cranial Nerves: No dysarthria or facial asymmetry. Sensory: Sensation is intact. No sensory deficit. Motor: Motor function is intact. No weakness, seizure activity or pronator drift. Coordination: Romberg sign negative. Coordination normal. Finger-Nose-Finger Test normal.      Gait: Gait normal.      Deep Tendon Reflexes: Reflexes normal.   Psychiatric:         Mood and Affect: Mood normal.         Behavior: Behavior normal.         Thought Content:  Thought content normal.         Judgment: Judgment normal.           DIAGNOSTIC RESULTS     RADIOLOGY:   Non-plain film images such as CT, Ultrasound and MRI are read by the radiologist. Plain radiographic images are visualized and preliminarilyinterpreted by No att. providers found with the below findings:        Interpretation per the Radiologist below, if available at the time of this note:    CTA HEAD W CONTRAST   Final Result   Severe atheromatous changes of the internal carotid arteries bilaterally as detailed above. A 70% focal stenosis of the   distal left internal carotid artery in the cavernous sinus. A normal size right ophthalmic artery. A small and attenuated left   ophthalmic artery. The fetal type posterior circulation. Signed by Dr Jovanny Roth   Final Result   1. Patchy atherosclerotic calcification within the proximal aspect of   each internal carotid artery. 2. 40% stenosis within the proximal left ICA. 3. 65% stenosis within the proximal right ICA   Signed by Dr Judge Conroy   Final Result   1. There are no hemorrhage, edema or mass effect. 2. Mild atrophy with associated mild prominence of the lateral and   third ventricles. 3. Low-density in the hemispheric white matter is nonspecific and   likely due to chronic small vessel disease. The full report of this exam was immediately signed and available to   the emergency room. The patient is currently in the emergency room. Signed by Dr Bianca SNYDER [address]    (Results Pending)       LABS:  Labs Reviewed   CBC WITH AUTO DIFFERENTIAL - Abnormal; Notable for the following components:       Result Value    RBC 4.51 (*)     MCV 99.3 (*)     MCH 32.4 (*)     MCHC 32.6 (*)     All other components within normal limits   PROTIME-INR - Abnormal; Notable for the following components:    Protime 15.9 (*)     INR 1.26 (*)     All other components within normal limits   POCT GLUCOSE - Abnormal; Notable for the following components:    POC Glucose 128 (*)     All other components within normal limits   COMPREHENSIVE METABOLIC PANEL W/ REFLEX TO MG FOR LOW K   APTT       All other labs were within normal range or notreturned as of this dictation.     RE-ASSESSMENT        EMERGENCY DEPARTMENT COURSE and DIFFERENTIAL DIAGNOSIS/MDM:   Vitals:    Vitals:    10/04/21 2130 10/04/21 2200 10/04/21 2230 10/04/21 2300   BP: (!) 152/75 (!) 146/76 (!) 166/82 (!) 151/83   Pulse:       Resp:       Temp:       TempSrc:       SpO2: 94% 93% 94% 93%         MDM  I spoke thoroughly with Dr. Praneeth Hartley with ophthalmology on-call group. It sounds as though based on Dr. Kelly Lorenzo evaluation after dilating the patient's pupils that there was concern for central retinal arterial occlusion which is a poor prognosis patient remained stable with no new symptoms he has been here multiple hours with no other complaints waiting on MRI results I am passing care over to my current attending Dr. Bell Shows on night shift waiting for the MRI result we do not see anything obvious on the image but were waiting for radiologist evaluation. Anticipate discharge. I spoke with Dr. Mirela Green with neurology agreeable see the patient on Wednesday Dr. Kennedi Veloz group will see the patient tomorrow morning for follow-up and evaluation. PROCEDURES:    Procedures      FINAL IMPRESSION      1. Visual disturbance    2.  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          DISPOSITION/PLAN   DISPOSITION Decision To Discharge 10/04/2021 11:55:47 PM      PATIENT REFERRED TO:  [facility] EMERGENCY DEPT  [facility]  [phone]    If symptoms worsen    Vangie Suero MD  [phone]    Call   to see Wednesday    Grecia Good MD  [facility] [address] [phone]      To see tomorrow at [address]:  Discharge Medication List as of 10/4/2021 11:17 PM          (Please note that portions of this note were completed with a voice recognition program.  Efforts were made to edit the dictations but occasionallywords are mis-transcribed.)    Sushant Lu Rodo [address] [place] [address]  10/05/21 1582

## 2021-10-05 ENCOUNTER — TELEPHONE (OUTPATIENT)
Dept: NEUROLOGY | Age: 71
End: 2021-10-05

## 2021-10-05 LAB
EKG P AXIS: 0 DEGREES
EKG P-R INTERVAL: 200 MS
EKG Q-T INTERVAL: 464 MS
EKG QRS DURATION: 92 MS
EKG QTC CALCULATION (BAZETT): 450 MS
EKG T AXIS: 27 DEGREES

## 2021-10-05 PROCEDURE — 93010 ELECTROCARDIOGRAM REPORT: CPT | Performed by: INTERNAL MEDICINE

## 2021-10-05 NOTE — TELEPHONE ENCOUNTER
Called son about an appointment with Dr David Schroeder, son stated that he or his mother is friends with Dr Catrachita Diamond, Dr Maurizio Lo told them to bring his dad in office to see him on 10-  At 8:30 AM

## 2021-10-05 NOTE — ED NOTES
Pt and his son upset that they were told pt would be discharged 2 hours ago by provider. Per pts' son, provider spoke with him on the phone and told him the pts' MRI results around 20:00 tonight. Per pts' son, he asked the provider, Jabari Warner,  when the pt would be ready for discharge and was told 10-15 minutes. After waiting for over 2 hours for pt to come out, pts' son called asking about the wait time. Results were not available at this time for MRI and Dr Shanice Harris reports she has been checking frequently for the results. Dafne Conte from [facility] was asked to look for the MRI report. After accessing their PACS system, Dafne Conte saw no report for the pts' MRI nor was the exam in the queue to be read. pts' son and pt both wanting to leave. There is still confusion about what was told to the pt and his son about the MRI, but pt was allowed to leave. Discharge instructions that were printed from what was entered by provider earlier.   Dr. Shanice Harris aware of pt leaving,     Praneeth Robert RN  10/05/21 0006

## 2021-10-08 ENCOUNTER — OFFICE VISIT (OUTPATIENT)
Dept: FAMILY MEDICINE CLINIC | Age: 71
End: 2021-10-08
Payer: MEDICARE

## 2021-10-08 VITALS
BODY MASS INDEX: 29.59 KG/M2 | HEIGHT: 75 IN | RESPIRATION RATE: 18 BRPM | TEMPERATURE: 97.5 F | HEART RATE: 76 BPM | DIASTOLIC BLOOD PRESSURE: 82 MMHG | OXYGEN SATURATION: 97 % | SYSTOLIC BLOOD PRESSURE: 134 MMHG | WEIGHT: 238 LBS

## 2021-10-08 DIAGNOSIS — I65.21 STENOSIS OF RIGHT CAROTID ARTERY: ICD-10-CM

## 2021-10-08 DIAGNOSIS — Z23 NEED FOR INFLUENZA VACCINATION: ICD-10-CM

## 2021-10-08 DIAGNOSIS — H34.9 RETINAL ARTERY OCCLUSION: Primary | ICD-10-CM

## 2021-10-08 DIAGNOSIS — I48.0 PAROXYSMAL ATRIAL FIBRILLATION (HCC): ICD-10-CM

## 2021-10-08 DIAGNOSIS — E78.2 MIXED HYPERLIPIDEMIA: ICD-10-CM

## 2021-10-08 PROCEDURE — G8484 FLU IMMUNIZE NO ADMIN: HCPCS | Performed by: CLINICAL NURSE SPECIALIST

## 2021-10-08 PROCEDURE — 90694 VACC AIIV4 NO PRSRV 0.5ML IM: CPT | Performed by: CLINICAL NURSE SPECIALIST

## 2021-10-08 PROCEDURE — 3017F COLORECTAL CA SCREEN DOC REV: CPT | Performed by: CLINICAL NURSE SPECIALIST

## 2021-10-08 PROCEDURE — G8427 DOCREV CUR MEDS BY ELIG CLIN: HCPCS | Performed by: CLINICAL NURSE SPECIALIST

## 2021-10-08 PROCEDURE — 99214 OFFICE O/P EST MOD 30 MIN: CPT | Performed by: CLINICAL NURSE SPECIALIST

## 2021-10-08 PROCEDURE — 1123F ACP DISCUSS/DSCN MKR DOCD: CPT | Performed by: CLINICAL NURSE SPECIALIST

## 2021-10-08 PROCEDURE — G0008 ADMIN INFLUENZA VIRUS VAC: HCPCS | Performed by: CLINICAL NURSE SPECIALIST

## 2021-10-08 PROCEDURE — G8417 CALC BMI ABV UP PARAM F/U: HCPCS | Performed by: CLINICAL NURSE SPECIALIST

## 2021-10-08 PROCEDURE — 4040F PNEUMOC VAC/ADMIN/RCVD: CPT | Performed by: CLINICAL NURSE SPECIALIST

## 2021-10-08 PROCEDURE — 1036F TOBACCO NON-USER: CPT | Performed by: CLINICAL NURSE SPECIALIST

## 2021-10-08 RX ORDER — ATORVASTATIN CALCIUM 40 MG/1
40 TABLET, FILM COATED ORAL DAILY
Qty: 90 TABLET | Refills: 3 | Status: SHIPPED | OUTPATIENT
Start: 2021-10-08 | End: 2021-12-08 | Stop reason: SDUPTHER

## 2021-10-08 RX ORDER — ASPIRIN 81 MG/1
81 TABLET ORAL DAILY
Qty: 90 TABLET | Refills: 1 | COMMUNITY
Start: 2021-10-08 | End: 2021-10-20

## 2021-10-08 ASSESSMENT — PATIENT HEALTH QUESTIONNAIRE - PHQ9
SUM OF ALL RESPONSES TO PHQ QUESTIONS 1-9: 0
1. LITTLE INTEREST OR PLEASURE IN DOING THINGS: 0
SUM OF ALL RESPONSES TO PHQ QUESTIONS 1-9: 0
SUM OF ALL RESPONSES TO PHQ QUESTIONS 1-9: 0
SUM OF ALL RESPONSES TO PHQ9 QUESTIONS 1 & 2: 0
2. FEELING DOWN, DEPRESSED OR HOPELESS: 0

## 2021-10-08 ASSESSMENT — ENCOUNTER SYMPTOMS
COLOR CHANGE: 0
EYE DISCHARGE: 0
BACK PAIN: 0
CHEST TIGHTNESS: 0
EYE PAIN: 0
COUGH: 0
SORE THROAT: 0
VOMITING: 0
CONSTIPATION: 0
SINUS PRESSURE: 0
DIARRHEA: 0
ABDOMINAL PAIN: 0
NAUSEA: 0
SHORTNESS OF BREATH: 0
WHEEZING: 0
TROUBLE SWALLOWING: 0

## 2021-10-08 NOTE — PROGRESS NOTES
SUBJECTIVE:  Hennie Apley is a 66 y.o. who presents today for Follow-up      HPI    Mr Simran Zacarias presents today for ER follow up. He woke up Monday morning with near total loss of vision right eye. He had fallen Friday, but otherwise was in his usual state of health. He has been to ER and ophthalmology this week. ER work up with negative CT head and MRI for acute findings. Carotids show 65-70% stenosis on right. He was diagnosed with retinal artery occlusion by ophthalmology. He can only see shadows right eye. No other neurologic symptoms. Underlying PAF, in NSR on exam.  On Eliquis, followed by cardiology. Underlying hyperlipidemia, has been well controlled on statin with LDL in 53 range.      Past Medical History:   Diagnosis Date    Adult general medical exam     CAD (coronary artery disease)     Elevated blood pressure reading     Erectile dysfunction     Hypertension     Mixed hyperlipidemia     Osteoarthritis     Prostate cancer [facility])      Past Surgical History:   Procedure Laterality Date    CARDIAC CATHETERIZATION  2020    EF 35%, moderate diffuse disease in LAD    CARDIOVERSION      DIAGNOSTIC CARDIAC CATH LAB PROCEDURE  2020    HIP SURGERY      KNEE SURGERY      PROSTATECTOMY       Family History   Problem Relation Age of Onset    Hypertension Mother     Coronary Art Dis Mother     Coronary Art Dis Father     Mult Sclerosis Sister      Social History     Tobacco Use    Smoking status: Former Smoker     Packs/day: 0.25     Years: 10.00     Pack years: 2.50     Types: Cigars     Quit date: 1975     Years since quittin.1    Smokeless tobacco: Former User     Types: Chew   Substance Use Topics    Alcohol use: Yes     Comment: social     Current Outpatient Medications   Medication Sig Dispense Refill    atorvastatin (LIPITOR) 40 MG tablet Take 1 tablet by mouth daily 90 tablet 3    aspirin EC 81 MG EC tablet Take 1 tablet by mouth daily 90 tablet 1  apixaban (ELIQUIS) 5 MG TABS tablet Take 1 tablet by mouth 2 times daily 14 tablet 0    amiodarone (CORDARONE) 200 MG tablet Take 1 tablet by mouth daily Take amiodarone 200 mg twice daily for 10 days and then 200 mg p.o. daily. (Patient taking differently: Take 200 mg by mouth daily ) 100 tablet 5    carvedilol (COREG) 6.25 MG tablet Take 1 tablet by mouth 2 times daily 180 tablet 5    lisinopril (PRINIVIL;ZESTRIL) 10 MG tablet TAKE 1 TABLET DAILY 90 tablet 3     No current facility-administered medications for this visit. No Known Allergies    Review of Systems   Constitutional: Negative for appetite change, chills, diaphoresis, fatigue and fever. HENT: Negative for congestion, ear pain, hearing loss, postnasal drip, sinus pressure, sore throat and trouble swallowing. Eyes: Positive for visual disturbance. Negative for pain and discharge. Respiratory: Negative for cough, chest tightness, shortness of breath and wheezing. Cardiovascular: Negative for chest pain, palpitations and leg swelling. Gastrointestinal: Negative for abdominal pain, constipation, diarrhea, nausea and vomiting. Genitourinary: Negative for difficulty urinating, dysuria, flank pain, frequency, hematuria and urgency. Musculoskeletal: Negative for arthralgias, back pain, joint swelling and neck pain. Skin: Negative for color change and rash. Neurological: Negative for dizziness, syncope, weakness, light-headedness and headaches. Psychiatric/Behavioral: Negative for behavioral problems, confusion and dysphoric mood. The patient is not nervous/anxious. OBJECTIVE:  /82   Pulse 76   Temp 97.5 °F (36.4 °C) (Temporal)   Resp 18   Ht 6' 3\" (1.905 m)   Wt 238 lb (108 kg)   SpO2 97%   BMI 29.75 kg/m²    Physical Exam  Vitals reviewed. Constitutional:       General: He is not in acute distress. Appearance: He is well-developed. He is not ill-appearing or toxic-appearing.    HENT:      Head: Normocephalic and atraumatic. Eyes:      General:         Right eye: No discharge. Left eye: No discharge. Conjunctiva/sclera: Conjunctivae normal.      Pupils: Pupils are equal, round, and reactive to light. Neck:      Thyroid: No thyromegaly. Trachea: No tracheal deviation. Cardiovascular:      Rate and Rhythm: Normal rate and regular rhythm. Heart sounds: No murmur heard. Pulmonary:      Effort: Pulmonary effort is normal. No respiratory distress. Breath sounds: Normal breath sounds. No wheezing or rales. Abdominal:      Palpations: Abdomen is soft. Genitourinary:     Penis: No tenderness. Musculoskeletal:         General: No deformity. Normal range of motion. Cervical back: Neck supple. Lymphadenopathy:      Cervical: No cervical adenopathy. Skin:     General: Skin is warm and dry. Findings: No erythema or rash. Neurological:      Mental Status: He is alert and oriented to person, place, and time. Mental status is at baseline. Motor: No weakness. Gait: Gait normal.   Psychiatric:         Mood and Affect: Mood normal.         Behavior: Behavior normal.         Thought Content: Thought content normal.         Judgment: Judgment normal.         ASSESSMENT/PLAN:  1. Retinal artery occlusion  New  Suspect from carotids  Add aspirin 81mg to eliquis  Keep appt with neurology on 10/18  Call if any further neuro symptoms  - aspirin EC 81 MG EC tablet; Take 1 tablet by mouth daily  Dispense: 90 tablet; Refill: 1  - Anju West MD, Vascular Surgery, [place]    2. Stenosis of right carotid artery  New, refer to vascular  Continue statin, increase dose  Add aspirin 81mg  - aspirin EC 81 MG EC tablet; Take 1 tablet by mouth daily  Dispense: 90 tablet; [facility] 1  [facility]    3.  Mixed hyperlipidemia  Controlled, but new vascular event- increase dose of lipitor  - atorvastatin (LIPITOR) 40 MG tablet;

## 2021-10-08 NOTE — PROGRESS NOTES
Immunizations Administered     Name Date Dose Route    Influenza, Quadv, adjuvanted, 65 yrs +, IM, PF (Fluad) 10/8/2021 0.5 mL Intramuscular    Site: Deltoid- Left    Lot: [id number]    NDC: 42265-773-90

## 2021-10-18 ENCOUNTER — OFFICE VISIT (OUTPATIENT)
Dept: NEUROSURGERY | Age: 71
End: 2021-10-18
Payer: MEDICARE

## 2021-10-18 VITALS
SYSTOLIC BLOOD PRESSURE: 138 MMHG | WEIGHT: 238 LBS | HEART RATE: 69 BPM | DIASTOLIC BLOOD PRESSURE: 64 MMHG | HEIGHT: 75 IN | BODY MASS INDEX: 29.59 KG/M2 | OXYGEN SATURATION: 97 %

## 2021-10-18 DIAGNOSIS — H34.11 CENTRAL RETINAL ARTERY OCCLUSION OF RIGHT EYE: Primary | ICD-10-CM

## 2021-10-18 PROCEDURE — G8484 FLU IMMUNIZE NO ADMIN: HCPCS | Performed by: PSYCHIATRY & NEUROLOGY

## 2021-10-18 PROCEDURE — 1123F ACP DISCUSS/DSCN MKR DOCD: CPT | Performed by: PSYCHIATRY & NEUROLOGY

## 2021-10-18 PROCEDURE — G8417 CALC BMI ABV UP PARAM F/U: HCPCS | Performed by: PSYCHIATRY & NEUROLOGY

## 2021-10-18 PROCEDURE — 99204 OFFICE O/P NEW MOD 45 MIN: CPT | Performed by: PSYCHIATRY & NEUROLOGY

## 2021-10-18 PROCEDURE — G8427 DOCREV CUR MEDS BY ELIG CLIN: HCPCS | Performed by: PSYCHIATRY & NEUROLOGY

## 2021-10-18 PROCEDURE — 3017F COLORECTAL CA SCREEN DOC REV: CPT | Performed by: PSYCHIATRY & NEUROLOGY

## 2021-10-18 PROCEDURE — 4040F PNEUMOC VAC/ADMIN/RCVD: CPT | Performed by: PSYCHIATRY & NEUROLOGY

## 2021-10-18 PROCEDURE — 1036F TOBACCO NON-USER: CPT | Performed by: PSYCHIATRY & NEUROLOGY

## 2021-10-18 RX ORDER — AMIODARONE HYDROCHLORIDE 200 MG/1
TABLET ORAL
Qty: 120 TABLET | Refills: 1 | Status: SHIPPED | OUTPATIENT
Start: 2021-10-18 | End: 2022-04-14

## 2021-10-19 ENCOUNTER — TELEPHONE (OUTPATIENT)
Dept: VASCULAR SURGERY | Age: 71
End: 2021-10-19

## 2021-10-19 ENCOUNTER — OFFICE VISIT (OUTPATIENT)
Dept: VASCULAR SURGERY | Age: 71
End: 2021-10-19
Payer: MEDICARE

## 2021-10-19 VITALS
RESPIRATION RATE: 18 BRPM | OXYGEN SATURATION: 98 % | SYSTOLIC BLOOD PRESSURE: 145 MMHG | DIASTOLIC BLOOD PRESSURE: 77 MMHG | HEART RATE: 60 BPM | TEMPERATURE: 96.9 F

## 2021-10-19 DIAGNOSIS — Z01.818 PRE-OP TESTING: Primary | ICD-10-CM

## 2021-10-19 DIAGNOSIS — I65.23 BILATERAL CAROTID ARTERY STENOSIS: Primary | ICD-10-CM

## 2021-10-19 DIAGNOSIS — H34.11 RETINAL ARTERY OCCLUSION, CENTRAL, RIGHT: ICD-10-CM

## 2021-10-19 PROCEDURE — 1036F TOBACCO NON-USER: CPT | Performed by: NURSE PRACTITIONER

## 2021-10-19 PROCEDURE — 99205 OFFICE O/P NEW HI 60 MIN: CPT | Performed by: NURSE PRACTITIONER

## 2021-10-19 PROCEDURE — G8427 DOCREV CUR MEDS BY ELIG CLIN: HCPCS | Performed by: NURSE PRACTITIONER

## 2021-10-19 PROCEDURE — G8484 FLU IMMUNIZE NO ADMIN: HCPCS | Performed by: NURSE PRACTITIONER

## 2021-10-19 PROCEDURE — 4040F PNEUMOC VAC/ADMIN/RCVD: CPT | Performed by: NURSE PRACTITIONER

## 2021-10-19 PROCEDURE — 1123F ACP DISCUSS/DSCN MKR DOCD: CPT | Performed by: NURSE PRACTITIONER

## 2021-10-19 PROCEDURE — G8417 CALC BMI ABV UP PARAM F/U: HCPCS | Performed by: NURSE PRACTITIONER

## 2021-10-19 PROCEDURE — 3017F COLORECTAL CA SCREEN DOC REV: CPT | Performed by: NURSE PRACTITIONER

## 2021-10-19 RX ORDER — SODIUM CHLORIDE 9 MG/ML
25 INJECTION, SOLUTION INTRAVENOUS PRN
Status: CANCELLED | OUTPATIENT
Start: 2021-10-19

## 2021-10-19 RX ORDER — CLOPIDOGREL BISULFATE 75 MG/1
TABLET ORAL
Qty: 30 TABLET | Refills: 0 | Status: SHIPPED | OUTPATIENT
Start: 2021-10-19 | End: 2021-11-10

## 2021-10-19 RX ORDER — SODIUM CHLORIDE 0.9 % (FLUSH) 0.9 %
10 SYRINGE (ML) INJECTION PRN
Status: CANCELLED | OUTPATIENT
Start: 2021-10-19

## 2021-10-19 RX ORDER — SODIUM CHLORIDE 0.9 % (FLUSH) 0.9 %
10 SYRINGE (ML) INJECTION EVERY 12 HOURS SCHEDULED
Status: CANCELLED | OUTPATIENT
Start: 2021-10-19

## 2021-10-19 NOTE — H&P (VIEW-ONLY)
Chalrotte Burroughs (:  1950) is a 66 y.o. male,New patient, here for evaluation of the following chief complaint(s):  New Patient (retinal artery occlusion)            SUBJECTIVE/OBJECTIVE:  He presents for follow up of carotid artery stenosis. He has a known history of carotid artery stenosis for < 1 year. His current treatment includes eliquis for a fib, ASA EC daily. He denies a history of CVA. He reports has not had TIA's, episodes of lateralizing weakness and episodes. He went to ER on 10/4 with loss of vision in right eye and retinal occlusion. He does report a fall a few days prior to this. He was camping. He had a few drinks and it was raining. He was going up stairs into his camper and fell. He does not recall lateralizing weakness but is unsure. Charlotte Burroughs is a 66 y.o. male with the following history as recorded in [facility]:  Patient Active Problem List    Diagnosis Date Noted    Cardiomyopathy [facility])     Retinal artery occlusion 10/08/2021    Stenosis of right carotid artery 10/08/2021    Atrial fibrillation ([facility] [address] 10/14/2020    Essential hypertension 2018    Prostate cancer ([facility] [address]     Mixed hyperlipidemia      Current Outpatient Medications   Medication Sig Dispense Refill    amiodarone (CORDARONE) 200 MG tablet TAKE 1 TABLET BY MOUTH TWICE A DAY FOR 10 DAYS THEN TAKE 1 TABLET BY MOUTH EVERY  tablet 1    atorvastatin (LIPITOR) 40 MG tablet Take 1 tablet by mouth daily 90 tablet 3    aspirin EC 81 MG EC tablet Take 1 tablet by mouth daily 90 tablet 1    apixaban (ELIQUIS) 5 MG TABS tablet Take 1 tablet by mouth 2 times daily 14 tablet 0    carvedilol (COREG) 6.25 MG tablet Take 1 tablet by mouth 2 times daily 180 tablet 5    lisinopril (PRINIVIL;ZESTRIL) 10 MG tablet TAKE 1 TABLET DAILY 90 tablet 3     No current facility-administered medications for this visit. Allergies: Patient has no known allergies.   Past Medical History:   Diagnosis Date    Adult general medical exam     CAD (coronary artery disease)     Elevated blood pressure reading     Erectile dysfunction     Hypertension     Mixed hyperlipidemia     Osteoarthritis     Prostate cancer [facility])      Past Surgical History:   Procedure Laterality Date    CARDIAC CATHETERIZATION  2020    EF 35%, moderate diffuse disease in LAD    CARDIOVERSION      DIAGNOSTIC CARDIAC CATH LAB PROCEDURE  2020    HIP SURGERY      KNEE SURGERY      PROSTATECTOMY       Family History   Problem Relation Age of Onset    Hypertension Mother     Coronary Art Dis Mother     Coronary Art Dis Father     Mult Sclerosis Sister      Social History     Tobacco Use    Smoking status: Former Smoker     Packs/day: 0.25     Years: 10.00     Pack years: 2.50     Types: Cigars     Quit date: 1975     Years since quittin.1    Smokeless tobacco: Former User     Types: Chew   Substance Use Topics    Alcohol use: Yes     Comment: social         Eyes  had sudden vision change or amaurosis. Respiratory  no significant shortness of breath,  Cardiovascular  no chest pain or syncope. No  significant leg swelling. No claudication. Musculoskeletal  no gait disturbance  Skin  no new wound. Neurologic   No speech difficulty or lateralizing weakness. All other review of systems are negative. Physical Exam    BP (!) 145/77 Comment: left  Pulse 60   Temp 96.9 °F (36.1 °C)   Resp 18   SpO2 98%       Neck- carotid pulses 2+ to palpation with no bruit  Cardiovascular  Regular rate and rhythm. Pulmonary  effort appears normal.  No respiratory distress. Lungs - Breath sounds normal. No wheezes or rales. Extremities - without edema   Neurologic  alert and oriented X 3. Physiologic. Face symmetric. Skin  warm, dry, and intact.   no wound  Psychiatric  mood, affect, and behavior appear normal.  Judgment and thought processes appear normal.    Risk factors for atherosclerosis of all vascular beds have been reviewed with the patient including:  Family history, tobacco abuse in all forms, elevated cholesterol, hyperlipidemia, and diabetes. cta head -   Severe atheromatous changes of the internal carotid   arteries bilaterally as detailed above. A 70% focal stenosis of the   distal left internal carotid artery in the cavernous sinus. A normal size right ophthalmic artery. A small and attenuated left   ophthalmic artery. The fetal type posterior circulation     cta neck -   1. Patchy atherosclerotic calcification within the proximal aspect of   each internal carotid artery. 2. 40% stenosis within the proximal left ICA. 3. 65% stenosis within the proximal right ICA     Individual images were reviewed. Results were reviewed with the patient. MRI brain -   No acute intracranial abnormality, particularly, no   finding to explain patient's symptoms of unilateral vision loss. Moderate chronic ischemic and atrophic changes     All images reviewed by Dr. Deven Morris      Reviewed on this visit: ER notes    Options have been discussed with the patient including continued medical management vs. proceeding with right CE. Patient has opted to proceed with right CE. Risks have been discussed with the patient including but not limited to MI, death, CVA, bleed, nerve injury, and infection. ASSESSMENT/PLAN:  1. Bilateral carotid artery stenosis  2. Retinal artery occlusion, central, right        Discussed management of CT scan which includes:  continue asa until 3 days prior to procedure and then we will switch to plavix. He will continue plavix with his eliquis for 3 weeks post procedure.   The will then d/c plavix and go back to asa and eliquis (for afib)  to reduce risk of TIA/CVA and to reduce risk of arterial thrombosis  Strongly encourage start/continue statin therapy -   Recommend no smoking - discussed the effect tobacco has on illness;   Proceed with right CE

## 2021-10-19 NOTE — TELEPHONE ENCOUNTER
I sw the pt to discuss the upcoming details of his surgery with Dr. Kellie Dawson on Fri 10/22/21. I will also ask our PAN for this to be printed and given to him at his pre-op appt tomorrow. Instructions discussed as follows:        Ruslan Apley    Surgery Directions    1. Report to the outpatient registration at [facility] on Friday 10/22/2021, at 6:00 am. Our surgery department will call the day prior after 2 pm the day prior to confirm this arrival time. 2. Nothing to eat or drink after midnight the night before the procedure. 3. Please take all medications as normally scheduled to take, including heart and blood pressure medicines with a sip of water. Except Lisinopril and Eliquis, do not take these medications the morning of surgery. 4. Do not take Lasix, insulin, or any diabetic medicine the morning of the procedure. If you take insulin, you may only take 1/2 of any scheduled nightly dose, and none the morning of the procedure. You may take all regularly scheduled heart, cholesterol, and blood pressure medicines with a sip of water. 5. If you take Glucophage, Metformin, Actos Plus Met, or Glucovance,please tell our nurse. it may interfere with some of the medications given during surgery. 6. Hold Eliquis for 1 day prior to surgery. Ester Ramachandran 10/21/2021)  7. Continue Plavix  8. Hold aspirin starting today. Resume aspirin once Plavix is completed 3 weeks post surgery. 9. If you have sleep apnea and require C-PAP, please bring this with you to the hospital.  10. Bring a list of all of your allergies and medications with you to the hospital.  11. Please let our nurse know if you have had an allergy to iodine, shellfish, or x-ray dye. 12. Let the nurse know if you take any of the followin. Over the counter herbal supplements  2. Diclofenec, indomethacin, ketoprofen, Caridopa/levadopa, naproxen, sulindac, piroxicam, glucosamine, Chondrotin, cocchine, or methotrexate.   13. Plan to stay at the hospital for 1-2 days. You will need a  to take you home upon hospital discharge. 14. Medications instructed to hold: See Above  15. Please stop at your local [facility] or pharmacy and buy a bottle of Hibiclens. Wash thoroughly with this the night before and the morning of the procedure, paying special attention to the area that will be operated on. Make sure you rinse very well. The Hibiclens should only be used prior to surgery. 16. You will need to use Bactroban Ointment prior to your procedure. You will need to apply the Bactroban Ointment to the inside of your nose on both sides twice a day for 5 days starting on Tue. 10/19/21. The Bactroban Ointment has been sent to [facility].  17. Please register at the [facility] Patient Registration on Wed. 10/20/2021 at 8:30 am for pre-op and covid-19 testing. We ask for you to self-isolate until the morning of surgery. 25. New policy requires that anyone who comes into the hospital will be required to wear a face mask. A cloth mask is acceptable. 19. To ensure the health and safety of our patients and staff, [facility] [place] has implemented visitor restrictions. Only one person will be allowed to accompany you for your procedure. If you or your visitor are exhibiting signs & symptoms of illness such as fever, cough, sore throat or body aches, we ask that you reschedule your procedure to a later date after your symptoms have been resolved. 20. Other Directions: If you have any questions or concerns please contact our office at [phone] and ask for Alexandre Bradshaw. Unless instructed otherwise by your physician, cleanse incision/puncture site twice daily with soap and water. Apply dry gauze. Do not get in tub. Okay to shower. Do not apply any salve, cream, peroxide or alcohol to the incision. Call with any increasing redness or drainage.     PLEASE NOTE:  If the patient is unable to sign his/her own paperwork, the appointed caregiver (POA, child, sibling, etc) must be present at the time of registration for all testing and procedures. Transportation to and from all testing and procedure appointments is the sole responsibility of the patient, caregiver, and/or nursing facility in which they reside. Please remember you will not be able to drive after you are discharged. Please call the office at [phone] with any questions or concerns. Please allow 48-72 hours notice for cancellations or rescheduling. We will attempt to accommodate your needs to the best of our capabilities, however, strict policies with procedure room availability does not allow much flexibility.

## 2021-10-19 NOTE — PROGRESS NOTES
Tiff Carreno (:  1950) is a 66 y.o. male,New patient, here for evaluation of the following chief complaint(s):  New Patient (retinal artery occlusion)            SUBJECTIVE/OBJECTIVE:  He presents for follow up of carotid artery stenosis. He has a known history of carotid artery stenosis for < 1 year. His current treatment includes eliquis for a fib, ASA EC daily. He denies a history of CVA. He reports has not had TIA's, episodes of lateralizing weakness and episodes. He went to ER on 10/4 with loss of vision in right eye and retinal occlusion. He does report a fall a few days prior to this. He was camping. He had a few drinks and it was raining. He was going up stairs into his camper and fell. He does not recall lateralizing weakness but is unsure. Tiff Carreno is a 66 y.o. male with the [facility]:  Patient Active Problem List    Diagnosis Date Noted    Cardiomyopathy [facility])     Retinal artery occlusion 10/08/2021    Stenosis of right carotid artery 10/08/2021    Atrial [facility] [address] 10/14/2020    Essential hypertension 2018    Prostate cancer ([facility] [address]     Mixed hyperlipidemia      Current Outpatient Medications   Medication Sig Dispense Refill    amiodarone (CORDARONE) 200 MG tablet TAKE 1 TABLET BY MOUTH TWICE A DAY FOR 10 DAYS THEN TAKE 1 TABLET BY MOUTH EVERY  tablet 1    atorvastatin (LIPITOR) 40 MG tablet Take 1 tablet by mouth daily 90 tablet 3    aspirin EC 81 MG EC tablet Take 1 tablet by mouth daily 90 tablet 1    apixaban (ELIQUIS) 5 MG TABS tablet Take 1 tablet by mouth 2 times daily 14 tablet 0    carvedilol (COREG) 6.25 MG tablet Take 1 tablet by mouth 2 times daily 180 tablet 5    lisinopril (PRINIVIL;ZESTRIL) 10 MG tablet TAKE 1 TABLET DAILY 90 tablet 3     No current facility-administered medications for this visit. Allergies: Patient has no known allergies.   Past Medical History:   Diagnosis Date    Adult general medical exam     CAD (coronary artery disease)     Elevated blood pressure reading     Erectile dysfunction     Hypertension     Mixed hyperlipidemia     Osteoarthritis     Prostate cancer [facility])      Past Surgical History:   Procedure Laterality Date    CARDIAC CATHETERIZATION  2020    EF 35%, moderate diffuse disease in LAD    CARDIOVERSION      DIAGNOSTIC CARDIAC CATH LAB PROCEDURE  2020    HIP SURGERY      KNEE SURGERY      PROSTATECTOMY       Family History   Problem Relation Age of Onset    Hypertension Mother     Coronary Art Dis Mother     Coronary Art Dis Father     Mult Sclerosis Sister      Social History     Tobacco Use    Smoking status: Former Smoker     Packs/day: 0.25     Years: 10.00     Pack years: 2.50     Types: Cigars     Quit date: 1975     Years since quittin.1    Smokeless tobacco: Former User     Types: Chew   Substance Use Topics    Alcohol use: Yes     Comment: social         Eyes  had sudden vision change or amaurosis. Respiratory  no significant shortness of breath,  Cardiovascular  no chest pain or syncope. No  significant leg swelling. No claudication. Musculoskeletal  no gait disturbance  Skin  no new wound. Neurologic   No speech difficulty or lateralizing weakness. All other review of systems are negative. Physical Exam    BP (!) 145/77 Comment: left  Pulse 60   Temp 96.9 °F (36.1 °C)   Resp 18   SpO2 98%       Neck- carotid pulses 2+ to palpation with no bruit  Cardiovascular  Regular rate and rhythm. Pulmonary  effort appears normal.  No respiratory distress. Lungs - Breath sounds normal. No wheezes or rales. Extremities - without edema   Neurologic  alert and oriented X 3. Physiologic. Face symmetric. Skin  warm, dry, and intact.   no wound  Psychiatric  mood, affect, and behavior appear normal.  Judgment and thought processes appear normal.    Risk factors for atherosclerosis of all vascular beds have been reviewed with the patient including:  Family history, tobacco abuse in all forms, elevated cholesterol, hyperlipidemia, and diabetes. cta head -   Severe atheromatous changes of the internal carotid   arteries bilaterally as detailed above. A 70% focal stenosis of the   distal left internal carotid artery in the cavernous sinus. A normal size right ophthalmic artery. A small and attenuated left   ophthalmic artery. The fetal type posterior circulation     cta neck -   1. Patchy atherosclerotic calcification within the proximal aspect of   each internal carotid artery. 2. 40% stenosis within the proximal left ICA. 3. 65% stenosis within the proximal right ICA     Individual images were reviewed. Results were reviewed with the patient. MRI brain -   No acute intracranial abnormality, particularly, no   finding to explain patient's symptoms of unilateral vision loss. Moderate chronic ischemic and atrophic changes     All images reviewed by Dr. Domonique Palafox      Reviewed on this visit: ER notes, neuro notes    Options have been discussed with the patient including continued medical management vs. proceeding with right CE. Patient has opted to proceed with right CE. Risks have been discussed with the patient including but not limited to MI, death, CVA, bleed, nerve injury, and infection. ASSESSMENT/PLAN:  1. Bilateral carotid artery stenosis  2. Retinal artery occlusion, central, right        Discussed management of CT scan which includes:  continue asa until 3 days prior to procedure and then we will switch to plavix. He will continue plavix with his eliquis for 3 weeks post procedure.   The will then d/c plavix and go back to asa and eliquis (for afib)  to reduce risk of TIA/CVA and to reduce risk of arterial thrombosis  Strongly encourage start/continue statin therapy -   Recommend no smoking - discussed the effect tobacco has on illness;   Proceed with right CE         Patient instructed to call or proceed to the emergency room with any symptoms of lateralizing weakness, loss of vision in one eye, or episodes slurred speech. An electronic signature was used to authenticate this note.     --Christina Rios APRN

## 2021-10-19 NOTE — H&P
Tiff Carreno (:  1950) is a 66 y.o. male,New patient, here for evaluation of the following chief complaint(s):  New Patient (retinal artery occlusion)            SUBJECTIVE/OBJECTIVE:  He presents for follow up of carotid artery stenosis. He has a known history of carotid artery stenosis for < 1 year. His current treatment includes eliquis for a fib, ASA EC daily. He denies a history of CVA. He reports has not had TIA's, episodes of lateralizing weakness and episodes. He went to ER on 10/4 with loss of vision in right eye and retinal occlusion. He does report a fall a few days prior to this. He was camping. He had a few drinks and it was raining. He was going up stairs into his camper and fell. He does not recall lateralizing weakness but is unsure. Tiff Carreno is a 66 y.o. male with the [facility]:  Patient Active Problem List    Diagnosis Date Noted    Cardiomyopathy [facility])     Retinal artery occlusion 10/08/2021    Stenosis of right carotid artery 10/08/2021    Atrial fibrillation ([facility] [address] 10/14/2020    Essential hypertension 2018    Prostate cancer ([facility] [facility]     Mixed hyperlipidemia      Current Outpatient Medications   Medication Sig Dispense Refill    amiodarone (CORDARONE) 200 MG tablet TAKE 1 TABLET BY MOUTH TWICE A DAY FOR 10 DAYS THEN TAKE 1 TABLET BY MOUTH EVERY  tablet 1    atorvastatin (LIPITOR) 40 MG tablet Take 1 tablet by mouth daily 90 tablet 3    aspirin EC 81 MG EC tablet Take 1 tablet by mouth daily 90 tablet 1    apixaban (ELIQUIS) 5 MG TABS tablet Take 1 tablet by mouth 2 times daily 14 tablet 0    carvedilol (COREG) 6.25 MG tablet Take 1 tablet by mouth 2 times daily 180 tablet 5    lisinopril (PRINIVIL;ZESTRIL) 10 MG tablet TAKE 1 TABLET DAILY 90 tablet 3     No current facility-administered medications for this visit. Allergies: Patient has no known allergies.   Past Medical History:   Diagnosis Date    Adult general medical exam     CAD (coronary artery disease)     Elevated blood pressure reading     Erectile dysfunction     Hypertension     Mixed hyperlipidemia     Osteoarthritis     Prostate cancer [facility])      Past Surgical History:   Procedure Laterality Date    CARDIAC CATHETERIZATION  2020    EF 35%, moderate diffuse disease in LAD    CARDIOVERSION      DIAGNOSTIC CARDIAC CATH LAB PROCEDURE  2020    HIP SURGERY      KNEE SURGERY      PROSTATECTOMY       Family History   Problem Relation Age of Onset    Hypertension Mother     Coronary Art Dis Mother     Coronary Art Dis Father     Mult Sclerosis Sister      Social History     Tobacco Use    Smoking status: Former Smoker     Packs/day: 0.25     Years: 10.00     Pack years: 2.50     Types: Cigars     Quit date: 1975     Years since quittin.1    Smokeless tobacco: Former User     Types: Chew   Substance Use Topics    Alcohol use: Yes     Comment: social         Eyes  had sudden vision change or amaurosis. Respiratory  no significant shortness of breath,  Cardiovascular  no chest pain or syncope. No  significant leg swelling. No claudication. Musculoskeletal  no gait disturbance  Skin  no new wound. Neurologic   No speech difficulty or lateralizing weakness. All other review of systems are negative. Physical Exam    BP (!) 145/77 Comment: left  Pulse 60   Temp 96.9 °F (36.1 °C)   Resp 18   SpO2 98%       Neck- carotid pulses 2+ to palpation with no bruit  Cardiovascular  Regular rate and rhythm. Pulmonary  effort appears normal.  No respiratory distress. Lungs - Breath sounds normal. No wheezes or rales. Extremities - without edema   Neurologic  alert and oriented X 3. Physiologic. Face symmetric. Skin  warm, dry, and intact.   no wound  Psychiatric  mood, affect, and behavior appear normal.  Judgment and thought processes appear normal.    Risk factors for atherosclerosis of all vascular beds have been reviewed with the patient including:  Family history, tobacco abuse in all forms, elevated cholesterol, hyperlipidemia, and diabetes. cta head -   Severe atheromatous changes of the internal carotid   arteries bilaterally as detailed above. A 70% focal stenosis of the   distal left internal carotid artery in the cavernous sinus. A normal size right ophthalmic artery. A small and attenuated left   ophthalmic artery. The fetal type posterior circulation     cta neck -   1. Patchy atherosclerotic calcification within the proximal aspect of   each internal carotid artery. 2. 40% stenosis within the proximal left ICA. 3. 65% stenosis within the proximal right ICA     Individual images were reviewed. Results were reviewed with the patient. MRI brain -   No acute intracranial abnormality, particularly, no   finding to explain patient's symptoms of unilateral vision loss. Moderate chronic ischemic and atrophic changes     All images reviewed by Dr. Irasema Caldera      Reviewed on this visit: ER notes    Options have been discussed with the patient including continued medical management vs. proceeding with right CE. Patient has opted to proceed with right CE. Risks have been discussed with the patient including but not limited to MI, death, CVA, bleed, nerve injury, and infection. ASSESSMENT/PLAN:  1. Bilateral carotid artery stenosis  2. Retinal artery occlusion, central, right        Discussed management of CT scan which includes:  continue asa until 3 days prior to procedure and then we will switch to plavix. He will continue plavix with his eliquis for 3 weeks post procedure.   The will then d/c plavix and go back to asa and eliquis (for afib)  to reduce risk of TIA/CVA and to reduce risk of arterial thrombosis  Strongly encourage start/continue statin therapy -   Recommend no smoking - discussed the effect tobacco has on illness;   Proceed with right CE

## 2021-10-20 ENCOUNTER — HOSPITAL ENCOUNTER (OUTPATIENT)
Dept: PREADMISSION TESTING | Age: 71
Discharge: HOME | DRG: 038 | End: 2021-10-24
Payer: MEDICARE

## 2021-10-20 VITALS — HEIGHT: 75 IN | BODY MASS INDEX: 29.84 KG/M2 | WEIGHT: 240 LBS

## 2021-10-20 DIAGNOSIS — Z01.818 PRE-OP TESTING: ICD-10-CM

## 2021-10-20 LAB
ABO/RH: NORMAL
ANION GAP SERPL CALCULATED.3IONS-SCNC: 13 MMOL/L (ref 7–19)
ANTIBODY SCREEN: NORMAL
BUN BLDV-MCNC: 17 MG/DL (ref 8–23)
CALCIUM SERPL-MCNC: 9 MG/DL (ref 8.8–10.2)
CHLORIDE BLD-SCNC: 106 MMOL/L (ref 98–111)
CO2: 23 MMOL/L (ref 22–29)
CREAT SERPL-MCNC: 1 MG/DL (ref 0.5–1.2)
GFR AFRICAN AMERICAN: >59
GFR NON-AFRICAN AMERICAN: >60
GLUCOSE BLD-MCNC: 107 MG/DL (ref 74–109)
POTASSIUM SERPL-SCNC: 4.3 MMOL/L (ref 3.5–5)
SARS-COV-2, PCR: NOT DETECTED
SODIUM BLD-SCNC: 142 MMOL/L (ref 136–145)

## 2021-10-20 PROCEDURE — 86850 RBC ANTIBODY SCREEN: CPT

## 2021-10-20 PROCEDURE — 80048 BASIC METABOLIC PNL TOTAL CA: CPT

## 2021-10-20 PROCEDURE — 87641 MR-STAPH DNA AMP PROBE: CPT

## 2021-10-20 PROCEDURE — 86900 BLOOD TYPING SEROLOGIC ABO: CPT

## 2021-10-20 PROCEDURE — U0003 INFECTIOUS AGENT DETECTION BY NUCLEIC ACID (DNA OR RNA); SEVERE ACUTE RESPIRATORY SYNDROME CORONAVIRUS 2 (SARS-COV-2) (CORONAVIRUS DISEASE [COVID-19]), AMPLIFIED PROBE TECHNIQUE, MAKING USE OF HIGH THROUGHPUT TECHNOLOGIES AS DESCRIBED BY CMS-2020-01-R: HCPCS

## 2021-10-20 PROCEDURE — U0005 INFEC AGEN DETEC AMPLI PROBE: HCPCS

## 2021-10-20 PROCEDURE — 86901 BLOOD TYPING SEROLOGIC RH(D): CPT

## 2021-10-21 ENCOUNTER — ANESTHESIA EVENT (OUTPATIENT)
Dept: OPERATING ROOM | Age: 71
DRG: 038 | End: 2021-10-21
Payer: MEDICARE

## 2021-10-21 ENCOUNTER — TELEPHONE (OUTPATIENT)
Dept: VASCULAR SURGERY | Age: 71
End: 2021-10-21

## 2021-10-21 DIAGNOSIS — I10 ESSENTIAL HYPERTENSION: ICD-10-CM

## 2021-10-21 LAB — MRSA SCREEN RT-PCR: NOT DETECTED

## 2021-10-21 RX ORDER — LISINOPRIL 10 MG/1
TABLET ORAL
Qty: 90 TABLET | Refills: 3 | Status: SHIPPED | OUTPATIENT
Start: 2021-10-21 | End: 2022-10-17

## 2021-10-21 NOTE — TELEPHONE ENCOUNTER
Maryana Mayorga requests that Bridget Mcgee return their call in regards to surgery scheduled for tomorrow. He has a few questions. The best time to reach him is Anytime @[phone]. Thank you.

## 2021-10-22 ENCOUNTER — ANESTHESIA (OUTPATIENT)
Dept: OPERATING ROOM | Age: 71
DRG: 038 | End: 2021-10-22
Payer: MEDICARE

## 2021-10-22 ENCOUNTER — HOSPITAL ENCOUNTER (INPATIENT)
Age: 71
LOS: 1 days | Discharge: HOME | DRG: 038 | End: 2021-10-23
Attending: SURGERY | Admitting: SURGERY
Payer: MEDICARE

## 2021-10-22 ENCOUNTER — APPOINTMENT (OUTPATIENT)
Dept: INTERVENTIONAL RADIOLOGY/VASCULAR | Age: 71
DRG: 038 | End: 2021-10-22
Attending: SURGERY
Payer: MEDICARE

## 2021-10-22 VITALS — TEMPERATURE: 96.6 F | DIASTOLIC BLOOD PRESSURE: 100 MMHG | SYSTOLIC BLOOD PRESSURE: 148 MMHG | OXYGEN SATURATION: 98 %

## 2021-10-22 LAB
ABO/RH: NORMAL
ANTIBODY SCREEN: NORMAL
HCT VFR BLD CALC: 44.4 % (ref 42–52)
HEMOGLOBIN: 14.8 G/DL (ref 14–18)
MCH RBC QN AUTO: 32.7 PG (ref 27–31)
MCHC RBC AUTO-ENTMCNC: 33.3 G/DL (ref 33–37)
MCV RBC AUTO: 98.2 FL (ref 80–94)
PDW BLD-RTO: 13.3 % (ref 11.5–14.5)
PLATELET # BLD: 193 K/UL (ref 130–400)
PMV BLD AUTO: 11.7 FL (ref 9.4–12.4)
RBC # BLD: 4.52 M/UL (ref 4.7–6.1)
WBC # BLD: 10.5 K/UL (ref 4.8–10.8)

## 2021-10-22 PROCEDURE — 88304 TISSUE EXAM BY PATHOLOGIST: CPT

## 2021-10-22 PROCEDURE — 2500000003 HC RX 250 WO HCPCS

## 2021-10-22 PROCEDURE — C1768 GRAFT, VASCULAR: HCPCS | Performed by: SURGERY

## 2021-10-22 PROCEDURE — 03UH0KZ SUPPLEMENT RIGHT COMMON CAROTID ARTERY WITH NONAUTOLOGOUS TISSUE SUBSTITUTE, OPEN APPROACH: ICD-10-PCS | Performed by: SURGERY

## 2021-10-22 PROCEDURE — B3131ZZ FLUOROSCOPY OF RIGHT COMMON CAROTID ARTERY USING LOW OSMOLAR CONTRAST: ICD-10-PCS | Performed by: SURGERY

## 2021-10-22 PROCEDURE — 3700000000 HC ANESTHESIA ATTENDED CARE: Performed by: SURGERY

## 2021-10-22 PROCEDURE — 7100000001 HC PACU RECOVERY - ADDTL 15 MIN: Performed by: SURGERY

## 2021-10-22 PROCEDURE — 86900 BLOOD TYPING SEROLOGIC ABO: CPT

## 2021-10-22 PROCEDURE — 3600000015 HC SURGERY LEVEL 5 ADDTL 15MIN: Performed by: SURGERY

## 2021-10-22 PROCEDURE — 88311 DECALCIFY TISSUE: CPT

## 2021-10-22 PROCEDURE — 6360000002 HC RX W HCPCS: Performed by: NURSE PRACTITIONER

## 2021-10-22 PROCEDURE — 2709999900 HC NON-CHARGEABLE SUPPLY: Performed by: SURGERY

## 2021-10-22 PROCEDURE — 3600000005 HC SURGERY LEVEL 5 BASE: Performed by: SURGERY

## 2021-10-22 PROCEDURE — 2580000003 HC RX 258: Performed by: SURGERY

## 2021-10-22 PROCEDURE — 3700000001 HC ADD 15 MINUTES (ANESTHESIA): Performed by: SURGERY

## 2021-10-22 PROCEDURE — 85027 COMPLETE CBC AUTOMATED: CPT

## 2021-10-22 PROCEDURE — 6360000002 HC RX W HCPCS

## 2021-10-22 PROCEDURE — 6360000002 HC RX W HCPCS: Performed by: SURGERY

## 2021-10-22 PROCEDURE — 2580000003 HC RX 258

## 2021-10-22 PROCEDURE — 36415 COLL VENOUS BLD VENIPUNCTURE: CPT

## 2021-10-22 PROCEDURE — 86901 BLOOD TYPING SEROLOGIC RH(D): CPT

## 2021-10-22 PROCEDURE — 2780000010 HC IMPLANT OTHER: Performed by: SURGERY

## 2021-10-22 PROCEDURE — 35301 RECHANNELING OF ARTERY: CPT | Performed by: SURGERY

## 2021-10-22 PROCEDURE — 03CH0ZZ EXTIRPATION OF MATTER FROM RIGHT COMMON CAROTID ARTERY, OPEN APPROACH: ICD-10-PCS | Performed by: SURGERY

## 2021-10-22 PROCEDURE — 7100000000 HC PACU RECOVERY - FIRST 15 MIN: Performed by: SURGERY

## 2021-10-22 PROCEDURE — 1210000000 HC MED SURG R&B

## 2021-10-22 PROCEDURE — 6370000000 HC RX 637 (ALT 250 FOR IP): Performed by: SURGERY

## 2021-10-22 PROCEDURE — 2580000003 HC RX 258: Performed by: ANESTHESIOLOGY

## 2021-10-22 PROCEDURE — 86850 RBC ANTIBODY SCREEN: CPT

## 2021-10-22 DEVICE — PATCH BIOLOGIC VASC 1CM WX14CM L .55MM THICKNESSXENOSURE: Type: IMPLANTABLE DEVICE | Status: FUNCTIONAL

## 2021-10-22 RX ORDER — DEXAMETHASONE SODIUM PHOSPHATE 4 MG/ML
INJECTION, SOLUTION INTRA-ARTICULAR; INTRALESIONAL; INTRAMUSCULAR; INTRAVENOUS; SOFT TISSUE PRN
Status: DISCONTINUED | OUTPATIENT
Start: 2021-10-22 | End: 2021-10-22 | Stop reason: SDUPTHER

## 2021-10-22 RX ORDER — OXYCODONE HYDROCHLORIDE 5 MG/1
5 TABLET ORAL EVERY 4 HOURS PRN
Status: DISCONTINUED | OUTPATIENT
Start: 2021-10-22 | End: 2021-10-23 | Stop reason: HOSPADM

## 2021-10-22 RX ORDER — DIPHENHYDRAMINE HYDROCHLORIDE 50 MG/ML
12.5 INJECTION INTRAMUSCULAR; INTRAVENOUS
Status: DISCONTINUED | OUTPATIENT
Start: 2021-10-22 | End: 2021-10-22 | Stop reason: HOSPADM

## 2021-10-22 RX ORDER — HYDROMORPHONE HYDROCHLORIDE 1 MG/ML
0.25 INJECTION, SOLUTION INTRAMUSCULAR; INTRAVENOUS; SUBCUTANEOUS
Status: DISCONTINUED | OUTPATIENT
Start: 2021-10-22 | End: 2021-10-23 | Stop reason: HOSPADM

## 2021-10-22 RX ORDER — LISINOPRIL 5 MG/1
5 TABLET ORAL DAILY
Status: DISCONTINUED | OUTPATIENT
Start: 2021-10-23 | End: 2021-10-23 | Stop reason: HOSPADM

## 2021-10-22 RX ORDER — ONDANSETRON 4 MG/1
4 TABLET, ORALLY DISINTEGRATING ORAL EVERY 8 HOURS PRN
Status: DISCONTINUED | OUTPATIENT
Start: 2021-10-22 | End: 2021-10-23 | Stop reason: HOSPADM

## 2021-10-22 RX ORDER — SODIUM CHLORIDE 9 MG/ML
25 INJECTION, SOLUTION INTRAVENOUS PRN
Status: DISCONTINUED | OUTPATIENT
Start: 2021-10-22 | End: 2021-10-23 | Stop reason: HOSPADM

## 2021-10-22 RX ORDER — SODIUM CHLORIDE 0.9 % (FLUSH) 0.9 %
5-40 SYRINGE (ML) INJECTION PRN
Status: DISCONTINUED | OUTPATIENT
Start: 2021-10-22 | End: 2021-10-22 | Stop reason: HOSPADM

## 2021-10-22 RX ORDER — SODIUM CHLORIDE, SODIUM LACTATE, POTASSIUM CHLORIDE, CALCIUM CHLORIDE 600; 310; 30; 20 MG/100ML; MG/100ML; MG/100ML; MG/100ML
INJECTION, SOLUTION INTRAVENOUS CONTINUOUS
Status: DISCONTINUED | OUTPATIENT
Start: 2021-10-22 | End: 2021-10-22

## 2021-10-22 RX ORDER — OXYCODONE HYDROCHLORIDE 5 MG/1
10 TABLET ORAL EVERY 4 HOURS PRN
Status: DISCONTINUED | OUTPATIENT
Start: 2021-10-22 | End: 2021-10-23 | Stop reason: HOSPADM

## 2021-10-22 RX ORDER — SODIUM CHLORIDE 0.9 % (FLUSH) 0.9 %
10 SYRINGE (ML) INJECTION PRN
Status: DISCONTINUED | OUTPATIENT
Start: 2021-10-22 | End: 2021-10-22 | Stop reason: HOSPADM

## 2021-10-22 RX ORDER — CARVEDILOL 6.25 MG/1
6.25 TABLET ORAL 2 TIMES DAILY
Status: DISCONTINUED | OUTPATIENT
Start: 2021-10-23 | End: 2021-10-23 | Stop reason: HOSPADM

## 2021-10-22 RX ORDER — AMIODARONE HYDROCHLORIDE 200 MG/1
200 TABLET ORAL 2 TIMES DAILY
Status: DISCONTINUED | OUTPATIENT
Start: 2021-10-22 | End: 2021-10-23 | Stop reason: HOSPADM

## 2021-10-22 RX ORDER — ENALAPRILAT 2.5 MG/2ML
1.25 INJECTION INTRAVENOUS
Status: COMPLETED | OUTPATIENT
Start: 2021-10-22 | End: 2021-10-22

## 2021-10-22 RX ORDER — SODIUM CHLORIDE 0.9 % (FLUSH) 0.9 %
10 SYRINGE (ML) INJECTION EVERY 12 HOURS SCHEDULED
Status: DISCONTINUED | OUTPATIENT
Start: 2021-10-22 | End: 2021-10-22 | Stop reason: HOSPADM

## 2021-10-22 RX ORDER — CARVEDILOL 6.25 MG/1
6.25 TABLET ORAL 2 TIMES DAILY
Status: DISCONTINUED | OUTPATIENT
Start: 2021-10-22 | End: 2021-10-22

## 2021-10-22 RX ORDER — SCOLOPAMINE TRANSDERMAL SYSTEM 1 MG/1
1 PATCH, EXTENDED RELEASE TRANSDERMAL ONCE
Status: DISCONTINUED | OUTPATIENT
Start: 2021-10-22 | End: 2021-10-22 | Stop reason: HOSPADM

## 2021-10-22 RX ORDER — LIDOCAINE HYDROCHLORIDE 10 MG/ML
INJECTION, SOLUTION EPIDURAL; INFILTRATION; INTRACAUDAL; PERINEURAL PRN
Status: DISCONTINUED | OUTPATIENT
Start: 2021-10-22 | End: 2021-10-22 | Stop reason: SDUPTHER

## 2021-10-22 RX ORDER — ROCURONIUM BROMIDE 10 MG/ML
INJECTION, SOLUTION INTRAVENOUS PRN
Status: DISCONTINUED | OUTPATIENT
Start: 2021-10-22 | End: 2021-10-22 | Stop reason: SDUPTHER

## 2021-10-22 RX ORDER — FENTANYL CITRATE 50 UG/ML
INJECTION, SOLUTION INTRAMUSCULAR; INTRAVENOUS PRN
Status: DISCONTINUED | OUTPATIENT
Start: 2021-10-22 | End: 2021-10-22 | Stop reason: SDUPTHER

## 2021-10-22 RX ORDER — SODIUM CHLORIDE 9 MG/ML
INJECTION, SOLUTION INTRAVENOUS CONTINUOUS
Status: ACTIVE | OUTPATIENT
Start: 2021-10-22 | End: 2021-10-22

## 2021-10-22 RX ORDER — HYDROMORPHONE HYDROCHLORIDE 1 MG/ML
0.5 INJECTION, SOLUTION INTRAMUSCULAR; INTRAVENOUS; SUBCUTANEOUS
Status: DISCONTINUED | OUTPATIENT
Start: 2021-10-22 | End: 2021-10-23 | Stop reason: HOSPADM

## 2021-10-22 RX ORDER — SODIUM CHLORIDE 0.9 % (FLUSH) 0.9 %
5-40 SYRINGE (ML) INJECTION EVERY 12 HOURS SCHEDULED
Status: DISCONTINUED | OUTPATIENT
Start: 2021-10-22 | End: 2021-10-23 | Stop reason: HOSPADM

## 2021-10-22 RX ORDER — SODIUM CHLORIDE 9 MG/ML
25 INJECTION, SOLUTION INTRAVENOUS PRN
Status: DISCONTINUED | OUTPATIENT
Start: 2021-10-22 | End: 2021-10-22 | Stop reason: HOSPADM

## 2021-10-22 RX ORDER — SODIUM CHLORIDE 0.9 % (FLUSH) 0.9 %
5-40 SYRINGE (ML) INJECTION EVERY 12 HOURS SCHEDULED
Status: DISCONTINUED | OUTPATIENT
Start: 2021-10-22 | End: 2021-10-22 | Stop reason: HOSPADM

## 2021-10-22 RX ORDER — HYDRALAZINE HYDROCHLORIDE 20 MG/ML
10 INJECTION INTRAMUSCULAR; INTRAVENOUS
Status: DISCONTINUED | OUTPATIENT
Start: 2021-10-22 | End: 2021-10-23 | Stop reason: HOSPADM

## 2021-10-22 RX ORDER — FENTANYL CITRATE 50 UG/ML
50 INJECTION, SOLUTION INTRAMUSCULAR; INTRAVENOUS
Status: DISCONTINUED | OUTPATIENT
Start: 2021-10-22 | End: 2021-10-22 | Stop reason: HOSPADM

## 2021-10-22 RX ORDER — PROPOFOL 10 MG/ML
INJECTION, EMULSION INTRAVENOUS PRN
Status: DISCONTINUED | OUTPATIENT
Start: 2021-10-22 | End: 2021-10-22 | Stop reason: SDUPTHER

## 2021-10-22 RX ORDER — LABETALOL HYDROCHLORIDE 5 MG/ML
5 INJECTION, SOLUTION INTRAVENOUS EVERY 10 MIN PRN
Status: DISCONTINUED | OUTPATIENT
Start: 2021-10-22 | End: 2021-10-22 | Stop reason: HOSPADM

## 2021-10-22 RX ORDER — CLOPIDOGREL BISULFATE 75 MG/1
75 TABLET ORAL NIGHTLY
Status: DISCONTINUED | OUTPATIENT
Start: 2021-10-22 | End: 2021-10-23 | Stop reason: HOSPADM

## 2021-10-22 RX ORDER — HYDROMORPHONE HYDROCHLORIDE 1 MG/ML
0.5 INJECTION, SOLUTION INTRAMUSCULAR; INTRAVENOUS; SUBCUTANEOUS EVERY 5 MIN PRN
Status: DISCONTINUED | OUTPATIENT
Start: 2021-10-22 | End: 2021-10-22 | Stop reason: HOSPADM

## 2021-10-22 RX ORDER — MORPHINE SULFATE 4 MG/ML
4 INJECTION, SOLUTION INTRAMUSCULAR; INTRAVENOUS
Status: DISCONTINUED | OUTPATIENT
Start: 2021-10-22 | End: 2021-10-22 | Stop reason: HOSPADM

## 2021-10-22 RX ORDER — SODIUM CHLORIDE, SODIUM LACTATE, POTASSIUM CHLORIDE, CALCIUM CHLORIDE 600; 310; 30; 20 MG/100ML; MG/100ML; MG/100ML; MG/100ML
INJECTION, SOLUTION INTRAVENOUS CONTINUOUS PRN
Status: DISCONTINUED | OUTPATIENT
Start: 2021-10-22 | End: 2021-10-22 | Stop reason: SDUPTHER

## 2021-10-22 RX ORDER — METOCLOPRAMIDE HYDROCHLORIDE 5 MG/ML
10 INJECTION INTRAMUSCULAR; INTRAVENOUS
Status: DISCONTINUED | OUTPATIENT
Start: 2021-10-22 | End: 2021-10-22 | Stop reason: HOSPADM

## 2021-10-22 RX ORDER — ONDANSETRON 2 MG/ML
INJECTION INTRAMUSCULAR; INTRAVENOUS PRN
Status: DISCONTINUED | OUTPATIENT
Start: 2021-10-22 | End: 2021-10-22 | Stop reason: SDUPTHER

## 2021-10-22 RX ORDER — PROMETHAZINE HYDROCHLORIDE 25 MG/ML
6.25 INJECTION, SOLUTION INTRAMUSCULAR; INTRAVENOUS
Status: DISCONTINUED | OUTPATIENT
Start: 2021-10-22 | End: 2021-10-22 | Stop reason: HOSPADM

## 2021-10-22 RX ORDER — ASPIRIN 81 MG/1
81 TABLET ORAL DAILY
Status: ON HOLD | COMMUNITY
End: 2021-10-23 | Stop reason: HOSPADM

## 2021-10-22 RX ORDER — HEPARIN SODIUM 1000 [USP'U]/ML
INJECTION, SOLUTION INTRAVENOUS; SUBCUTANEOUS PRN
Status: DISCONTINUED | OUTPATIENT
Start: 2021-10-22 | End: 2021-10-22 | Stop reason: SDUPTHER

## 2021-10-22 RX ORDER — SODIUM CHLORIDE 0.9 % (FLUSH) 0.9 %
5-40 SYRINGE (ML) INJECTION PRN
Status: DISCONTINUED | OUTPATIENT
Start: 2021-10-22 | End: 2021-10-23 | Stop reason: HOSPADM

## 2021-10-22 RX ORDER — ATORVASTATIN CALCIUM 40 MG/1
40 TABLET, FILM COATED ORAL NIGHTLY
Status: DISCONTINUED | OUTPATIENT
Start: 2021-10-22 | End: 2021-10-23 | Stop reason: HOSPADM

## 2021-10-22 RX ORDER — HYDRALAZINE HYDROCHLORIDE 20 MG/ML
5 INJECTION INTRAMUSCULAR; INTRAVENOUS EVERY 10 MIN PRN
Status: DISCONTINUED | OUTPATIENT
Start: 2021-10-22 | End: 2021-10-22 | Stop reason: HOSPADM

## 2021-10-22 RX ORDER — ONDANSETRON 2 MG/ML
4 INJECTION INTRAMUSCULAR; INTRAVENOUS EVERY 6 HOURS PRN
Status: DISCONTINUED | OUTPATIENT
Start: 2021-10-22 | End: 2021-10-23 | Stop reason: HOSPADM

## 2021-10-22 RX ORDER — RIFAMPIN 600 MG/10ML
INJECTION, POWDER, LYOPHILIZED, FOR SOLUTION INTRAVENOUS PRN
Status: DISCONTINUED | OUTPATIENT
Start: 2021-10-22 | End: 2021-10-22 | Stop reason: HOSPADM

## 2021-10-22 RX ORDER — LIDOCAINE HYDROCHLORIDE 10 MG/ML
1 INJECTION, SOLUTION EPIDURAL; INFILTRATION; INTRACAUDAL; PERINEURAL
Status: DISCONTINUED | OUTPATIENT
Start: 2021-10-22 | End: 2021-10-22 | Stop reason: HOSPADM

## 2021-10-22 RX ORDER — HYDROMORPHONE HYDROCHLORIDE 1 MG/ML
0.25 INJECTION, SOLUTION INTRAMUSCULAR; INTRAVENOUS; SUBCUTANEOUS EVERY 5 MIN PRN
Status: DISCONTINUED | OUTPATIENT
Start: 2021-10-22 | End: 2021-10-22 | Stop reason: HOSPADM

## 2021-10-22 RX ORDER — CLONIDINE HYDROCHLORIDE 0.1 MG/1
0.1 TABLET ORAL
Status: DISCONTINUED | OUTPATIENT
Start: 2021-10-22 | End: 2021-10-23 | Stop reason: HOSPADM

## 2021-10-22 RX ORDER — NITROGLYCERIN 20 MG/100ML
INJECTION INTRAVENOUS PRN
Status: DISCONTINUED | OUTPATIENT
Start: 2021-10-22 | End: 2021-10-22 | Stop reason: SDUPTHER

## 2021-10-22 RX ORDER — MIDAZOLAM HYDROCHLORIDE 1 MG/ML
2 INJECTION INTRAMUSCULAR; INTRAVENOUS
Status: DISCONTINUED | OUTPATIENT
Start: 2021-10-22 | End: 2021-10-22 | Stop reason: HOSPADM

## 2021-10-22 RX ADMIN — PHENYLEPHRINE HYDROCHLORIDE 80 MCG: 10 INJECTION INTRAVENOUS at 08:28

## 2021-10-22 RX ADMIN — ROCURONIUM BROMIDE 40 MG: 10 INJECTION, SOLUTION INTRAVENOUS at 08:26

## 2021-10-22 RX ADMIN — ATORVASTATIN CALCIUM 40 MG: 40 TABLET, FILM COATED ORAL at 21:42

## 2021-10-22 RX ADMIN — ROCURONIUM BROMIDE 10 MG: 10 INJECTION, SOLUTION INTRAVENOUS at 08:23

## 2021-10-22 RX ADMIN — HEPARIN SODIUM 6000 UNITS: 1000 INJECTION, SOLUTION INTRAVENOUS; SUBCUTANEOUS at 09:22

## 2021-10-22 RX ADMIN — FENTANYL CITRATE 50 MCG: 50 INJECTION, SOLUTION INTRAMUSCULAR; INTRAVENOUS at 08:18

## 2021-10-22 RX ADMIN — PHENYLEPHRINE HYDROCHLORIDE 40 MCG: 10 INJECTION INTRAVENOUS at 10:37

## 2021-10-22 RX ADMIN — ROCURONIUM BROMIDE 10 MG: 10 INJECTION, SOLUTION INTRAVENOUS at 09:15

## 2021-10-22 RX ADMIN — HYDRALAZINE HYDROCHLORIDE 5 MG: 20 INJECTION INTRAMUSCULAR; INTRAVENOUS at 12:03

## 2021-10-22 RX ADMIN — PHENYLEPHRINE HYDROCHLORIDE 80 MCG: 10 INJECTION INTRAVENOUS at 08:23

## 2021-10-22 RX ADMIN — HYDRALAZINE HYDROCHLORIDE 5 MG: 20 INJECTION INTRAMUSCULAR; INTRAVENOUS at 11:48

## 2021-10-22 RX ADMIN — AMIODARONE HYDROCHLORIDE 200 MG: 200 TABLET ORAL at 14:17

## 2021-10-22 RX ADMIN — SODIUM CHLORIDE, SODIUM LACTATE, POTASSIUM CHLORIDE, AND CALCIUM CHLORIDE: 600; 310; 30; 20 INJECTION, SOLUTION INTRAVENOUS at 07:44

## 2021-10-22 RX ADMIN — NITROGLYCERIN 40 MCG: 20 INJECTION INTRAVENOUS at 10:46

## 2021-10-22 RX ADMIN — Medication 2000 MG: at 08:38

## 2021-10-22 RX ADMIN — ENALAPRILAT 1.25 MG: 1.25 INJECTION INTRAVENOUS at 12:13

## 2021-10-22 RX ADMIN — FENTANYL CITRATE 50 MCG: 50 INJECTION, SOLUTION INTRAMUSCULAR; INTRAVENOUS at 09:01

## 2021-10-22 RX ADMIN — ONDANSETRON HYDROCHLORIDE 4 MG: 2 INJECTION, SOLUTION INTRAMUSCULAR; INTRAVENOUS at 10:50

## 2021-10-22 RX ADMIN — NITROGLYCERIN 20 MCG: 20 INJECTION INTRAVENOUS at 09:37

## 2021-10-22 RX ADMIN — NITROGLYCERIN 20 MCG: 20 INJECTION INTRAVENOUS at 09:38

## 2021-10-22 RX ADMIN — PROPOFOL 20 MG: 10 INJECTION, EMULSION INTRAVENOUS at 08:25

## 2021-10-22 RX ADMIN — FENTANYL CITRATE 50 MCG: 50 INJECTION, SOLUTION INTRAMUSCULAR; INTRAVENOUS at 08:21

## 2021-10-22 RX ADMIN — PROPOFOL 150 MG: 10 INJECTION, EMULSION INTRAVENOUS at 08:23

## 2021-10-22 RX ADMIN — CARVEDILOL 6.25 MG: 6.25 TABLET, FILM COATED ORAL at 14:17

## 2021-10-22 RX ADMIN — CLOPIDOGREL BISULFATE 75 MG: 75 TABLET ORAL at 21:42

## 2021-10-22 RX ADMIN — FENTANYL CITRATE 50 MCG: 50 INJECTION, SOLUTION INTRAMUSCULAR; INTRAVENOUS at 08:50

## 2021-10-22 RX ADMIN — NITROGLYCERIN 40 MCG: 20 INJECTION INTRAVENOUS at 09:41

## 2021-10-22 RX ADMIN — DEXAMETHASONE SODIUM PHOSPHATE 4 MG: 4 INJECTION, SOLUTION INTRAMUSCULAR; INTRAVENOUS at 08:49

## 2021-10-22 RX ADMIN — Medication 1000 MG: at 16:38

## 2021-10-22 RX ADMIN — HYDRALAZINE HYDROCHLORIDE 5 MG: 20 INJECTION INTRAMUSCULAR; INTRAVENOUS at 11:54

## 2021-10-22 RX ADMIN — PROPOFOL 30 MG: 10 INJECTION, EMULSION INTRAVENOUS at 09:39

## 2021-10-22 RX ADMIN — FENTANYL CITRATE 50 MCG: 50 INJECTION, SOLUTION INTRAMUSCULAR; INTRAVENOUS at 09:29

## 2021-10-22 RX ADMIN — SODIUM CHLORIDE, SODIUM LACTATE, POTASSIUM CHLORIDE, AND CALCIUM CHLORIDE: 600; 310; 30; 20 INJECTION, SOLUTION INTRAVENOUS at 08:15

## 2021-10-22 RX ADMIN — SODIUM CHLORIDE: 9 INJECTION, SOLUTION INTRAVENOUS at 14:19

## 2021-10-22 RX ADMIN — HYDRALAZINE HYDROCHLORIDE 5 MG: 20 INJECTION INTRAMUSCULAR; INTRAVENOUS at 11:35

## 2021-10-22 RX ADMIN — LIDOCAINE HYDROCHLORIDE 50 MG: 10 INJECTION, SOLUTION EPIDURAL; INFILTRATION; INTRACAUDAL; PERINEURAL at 08:23

## 2021-10-22 ASSESSMENT — LIFESTYLE VARIABLES: SMOKING_STATUS: 0

## 2021-10-22 NOTE — OP NOTE
Preoperative Diagnosis:  1. Symptomatic right carotid artery stenosis    Post Op Diagnosis: Same    Operative Procedure:  1. Right carotid endarterectomy with bovine pericardial patch  2. Right cervical carotid arteriograms    Anesthesia:  General endotracheal    Estimated Blood Loss:  100 mL    Specimens:  Plaque to pathology    Drains:  Fluted JABARI right neck wound    Findings:   1. There was at least 60 percent stenosis of the right internal carotid artery. Irregular, cauliflower like calcified plaque plus ulceration. 2.  The stenosis extended 3 cm beyond the carotid bulb. 3.  Post cervical carotid arteriogram showed a widely patent internal carotid artery. The external carotid artery remains patent. Procedure in detail:    After the patient was consented and given intravenous antibiotics, he was brought to the operating room and placed on the operating room table in the supine position. General endotracheal anesthesia was achieved. The patient's right neck was prepped and draped in the usual sterile procedure. A curvilinear incision in the right neck was made anterior to the sternocleidomastoid with scalpel. This was carried through subcutaneous tissue and platysma with bovie electrocautery. Facial vein branches are transected between hemostats and ligated with 3-0 Vicryl and 4-0 Vicryl sutures and/or clips. The patient was given intravenous heparin and the right common carotid and superior thyroid arteries were then dissected circumferentially and vesseloops placed for future vascular control. Finally, the internal carotid artery is dissected  and vessel loops were placed for future vascular control. Ansa cervicalis was not transected between clips. The hypoglossal and vagus nerves were carefully identified and protected throughout this dissection. After adequate exposure and heparinization time, the distal internal, common and external carotid arteries are clamped.   A longitudinal arteriotomy made in the common carotid artery with 11 blade and carried proximally with Santo scissors and distally along the course of the internal carotid artery and beyond the plaque. The artery was irrigated with heparinized saline and then a Sundt shunt was placed in the internal and then the common carotid artery so flow was restored to the right brain. [place] elevator was used to remove plaque from the common carotid artery and the plaque was cut flush with the proximal arteriotomy with Santo scissors. An eversion endarterectomy was performed of the external carotid artery with excellent back bleeding. The plaque was removed from the internal carotid artery. The distal intima was tacked using 7-0 Prolene sutures. The plaque was passed off the field as specimen and then any additional debris is removed under loupe magnification with small forceps. After all the debris was removed, the area was irrigated with heparinized saline and then a bovine pericardial patch of the common and internal carotid arteries was performed with 6-0 Prolene running suture. Prior to competing this patch repair, the Sundt shunt was removed and standard flushing techniques were used to remove any air and debris from the native vessels and flow was restored to the right brain. A butterfly needle was placed in the common carotid artery and right cervical carotid arteriograms were performed with above findings noted. A fluted JABARI drain was placed through a separate stab incision in the patient's neck and secured with heavy silk suture. Platysma was closed over the drain with 3-0 PDS running suture. Skin was closed with 4-0 monocryl subcuticular sutures and dermabond skin adhesive. The patient tolerated the procedure well. He was extubated, following all commands in the operating room and moving all extremities. He was brought to the recovery room in good condition.

## 2021-10-22 NOTE — ANESTHESIA PRE PROCEDURE
Department of Anesthesiology  Preprocedure Note       Name:  Jonel Castorena   Age:  66 y.o.  :  1950                                          MRN:  [id number]         Date:  10/22/2021      Surgeon: Ivan Howe):  Karron Goodell, MD    Procedure: Procedure(s):  RIGHT CAROTID ENDARTERECTOMY    Medications prior to admission:   Prior to Admission medications    Medication Sig Start Date End Date Taking? Authorizing Provider   aspirin 81 MG EC tablet Take 81 mg by mouth daily   Yes Historical Provider, MD   lisinopril (PRINIVIL;ZESTRIL) 10 MG tablet TAKE 1 TABLET DAILY 10/21/21  Yes CHEIKH Alba   clopidogrel (PLAVIX) 75 MG tablet Take one tablet by mouth daily. Discontinue 3 weeks post carotid surgery and resume Aspirin. Patient taking differently: Take 75 mg by mouth nightly Take one tablet by mouth daily. Discontinue 3 weeks post carotid surgery and resume Aspirin.  10/19/21  Yes CHEIKH Miller   mupirocin (BACTROBAN) 2 % ointment Apply to nares BID x 5 days starting 10/19/2021 10/19/21 10/26/21 Yes CHEIKH Miller   amiodarone (CORDARONE) 200 MG tablet TAKE 1 TABLET BY MOUTH TWICE A DAY FOR 10 DAYS THEN TAKE 1 TABLET BY MOUTH EVERY DAY  Patient taking differently: Take 200 mg by mouth 2 times daily  10/18/21  Yes CHEIKH Henry - NP   atorvastatin (LIPITOR) 40 MG tablet Take 1 tablet by mouth daily  Patient taking differently: Take 40 mg by mouth nightly  10/8/21  Yes CHEIKH Alba   apixaban (ELIQUIS) 5 MG TABS tablet Take 1 tablet by mouth 2 times daily 21  Yes CHEIKH Gurrola   carvedilol (COREG) 6.25 MG tablet Take 1 tablet by mouth 2 times daily 12/3/20  Yes Rico Magaña MD       Current medications:    Current Facility-Administered Medications   Medication Dose Route Frequency Provider Last Rate Last Admin    0.9 % sodium chloride infusion  25 mL IntraVENous PRN CHEIKH Miller        ceFAZolin (ANCEF) 2000 mg in 0.9% sodium chloride 50 mL IVPB 2,000 mg IntraVENous On Call to [address], CHEIKH        sodium chloride flush 0.9 % injection 10 mL  10 mL IntraVENous 2 times per day Janee Muro, APRMAYRA        sodium chloride flush 0.9 % injection 10 mL  10 mL IntraVENous PRN Janee Muro APRN           Allergies:  No Known Allergies    Problem List:    Patient Active Problem List   Diagnosis Code    Mixed hyperlipidemia E78.2    Prostate cancer ([facility] [address] C61    Essential hypertension I10    Atrial fibrillation (HCC) I48.91    Cardiomyopathy ([facility] [address] I42.9    Retinal artery occlusion H34.9    Stenosis of right carotid artery I65.21       Past Medical History:        Diagnosis Date    Atrial fibrillation [facility])     sees [facility] cardiology    CAD (coronary artery disease)     Carotid occlusion, bilateral     has affected vision recently    Erectile dysfunction     Hypertension     Mixed hyperlipidemia     Osteoarthritis     Prostate cancer ([facility] [address]        Past Surgical History:        Procedure Laterality Date    CARDIAC CATHETERIZATION  2020    EF 35%, moderate diffuse disease in LAD    CARDIOVERSION      DIAGNOSTIC CARDIAC CATH LAB PROCEDURE  2020    HIP SURGERY      JOINT REPLACEMENT      KNEE SURGERY      replacement    PROSTATECTOMY         Social History:    Social History     Tobacco Use    Smoking status: Former Smoker     Packs/day: 0.25     Years: 10.00     Pack years: 2.50     Types: Cigars     Quit date: 1975     Years since quittin.1    Smokeless tobacco: Former User     Types: Chew   Substance Use Topics    Alcohol use: Yes     Comment: social                                Counseling given: Not Answered      Vital Signs (Current):   Vitals:    10/22/21 0620   BP: (!) 143/75   Pulse: 56   Resp: 14   Temp: 97.8 °F (36.6 °C)   TempSrc: Tympanic   SpO2: 99%   Weight: 240 lb (108.9 kg)   Height: 6' 3\" (1.905 m)                                              BP Readings from Last 3 Encounters:   10/22/21 (!) 143/75   10/19/21 (!) 145/77   10/18/21 138/64       NPO Status: Time of last liquid consumption: 2100                        Time of last solid consumption: 2100                        Date of last liquid consumption: 10/21/21                        Date of last solid food consumption: 10/21/21    BMI:   Wt Readings from Last 3 Encounters:   10/22/21 240 lb (108.9 kg)   10/20/21 240 lb (108.9 kg)   10/18/21 238 lb (108 kg)     Body mass index is 30 kg/m². CBC:   Lab Results   Component Value Date    WBC 5.2 10/04/2021    RBC 4.51 10/04/2021    HGB 14.6 10/04/2021    HGB 15.8 04/28/2011    HCT 44.8 10/04/2021    HCT 45.9 04/28/2011    MCV 99.3 10/04/2021    RDW 13.1 10/04/2021     10/04/2021     04/28/2011       CMP:   Lab Results   Component Value Date     10/20/2021     04/28/2011    K 4.3 10/20/2021    K 4.7 10/04/2021     10/20/2021     04/28/2011    CO2 23 10/20/2021    BUN 17 10/20/2021    CREATININE 1.0 10/20/2021    CREATININE 0.9 04/28/2011    GFRAA >59 10/20/2021    LABGLOM >60 10/20/2021    GLUCOSE 107 10/20/2021    PROT 6.9 10/04/2021    CALCIUM 9.0 10/20/2021    BILITOT 0.9 10/04/2021    ALKPHOS 85 10/04/2021    AST 16 10/04/2021    ALT 15 10/04/2021       POC Tests: No results for input(s): POCGLU, POCNA, POCK, POCCL, POCBUN, POCHEMO, POCHCT in the last 72 hours.     Coags:   Lab Results   Component Value Date    PROTIME 15.9 10/04/2021    INR 1.26 10/04/2021    APTT 33.8 10/04/2021       HCG (If Applicable): No results found for: PREGTESTUR, PREGSERUM, HCG, HCGQUANT     ABGs: No results found for: PHART, PO2ART, VYW2QJQ, OYZ7PBV, BEART, I4PWNSTC     Type & Screen (If Applicable):  No results found for: LABABO, LABRH    Drug/Infectious Status (If Applicable):  No results found for: HIV, HEPCAB    COVID-19 Screening (If Applicable):   Lab Results   Component Value Date    COVID19 Not Detected 10/20/2021           Anesthesia Evaluation  Patient summary reviewed and Nursing notes reviewed no history of anesthetic complications:   Airway: Mallampati: II  TM distance: >3 FB   Neck ROM: full  Mouth opening: > = 3 FB Dental:      Comment: Chipped dentition, none loose    Pulmonary:normal exam        (-) COPD, asthma and not a current smoker                           Cardiovascular:    (+) hypertension:, dysrhythmias: atrial fibrillation, hyperlipidemia    (-) past MI    ECG reviewed      Echocardiogram reviewed         Beta Blocker:  Dose within 24 Hrs      ROS comment:  Limited study, no doppler, no aortic valve interrogation   Normal left ventricular chamber size and systolic function. Mild concentric left ventricular hypertrophy. Left ventricular ejection fraction is visually estimated at 60-65%. Aortic valve leaflets are mildly thickened. There is mildly decreased   excursion. Suspect mild aortic stenosis, doppler not performed. Neuro/Psych:   (+) CVA:,    (-) seizures            ROS comment: Ocular stroke, no vision in right eye GI/Hepatic/Renal:        (-) GERD, liver disease and no renal disease       Endo/Other:    (+) blood dyscrasia: anticoagulation therapy, arthritis:., .    (-) diabetes mellitus               Abdominal:             Vascular:   + PVD, aortic or cerebral, . Other Findings:             Anesthesia Plan      general     ASA 3       Induction: intravenous. arterial line  MIPS: Postoperative opioids intended and Prophylactic antiemetics administered. Anesthetic plan and risks discussed with patient, spouse and child/children. Plan discussed with CRNA.                   Miriam Perez MD   10/22/2021

## 2021-10-22 NOTE — ANESTHESIA POSTPROCEDURE EVALUATION
Department of Anesthesiology  Postprocedure Note    Patient: Jonel Castorena  MRN: [id number]  YOB: 1950  Date of evaluation: 10/22/2021  Time:  11:35 AM     Procedure Summary     Date: 10/22/21 Room / Location: [address]    Anesthesia Start: 0815 Anesthesia Stop: 9489    Procedure: RIGHT CAROTID ENDARTERECTOMY (Right ) Diagnosis: (I65.23, H34.11)    Surgeons: Karron Goodell, MD Responsible Provider: CHEIKH Velasquez CRNA    Anesthesia Type: general ASA Status: 3          Anesthesia Type: general    Ricky Phase I: Ricky Score: 10    Ricky Phase II:      Last vitals: Reviewed and per EMR flowsheets.        Anesthesia Post Evaluation    Patient location during evaluation: PACU  Patient participation: complete - patient participated  Level of consciousness: awake and alert  Pain score: 0  Airway patency: patent  Nausea & Vomiting: no nausea and no vomiting  Complications: no  Cardiovascular status: blood pressure returned to baseline  Respiratory status: acceptable and room air  Hydration status: euvolemic

## 2021-10-22 NOTE — PROGRESS NOTES
Pharmacy Note  Vancomycin Consult    Hines Kocher is a 66 y.o. male started on Vancomycin for surgical prophylaxis; consult received from Dr. Magali Love to manage therapy. Principal Problem:    Symptomatic stenosis of right carotid artery  Resolved Problems:    * No resolved hospital problems. *      Allergies:  Patient has no known allergies. Temp max: 99.3    Recent Labs     10/20/21  0900   BUN 17       Recent Labs     10/20/21  0900   CREATININE 1.0       No results for input(s): WBC in the last 72 hours. Intake/Output Summary (Last 24 hours) at 10/22/2021 1355  Last data filed at 10/22/2021 1122  Gross per 24 hour   Intake 1200 ml   Output 100 ml   Net 1100 ml       Culture Date Source Results   none                   Ht Readings from Last 1 Encounters:   10/22/21 6' 3\" (1.905 m)        Wt Readings from Last 1 Encounters:   10/22/21 240 lb (108.9 kg)         Body mass index is 30 kg/m². Estimated Creatinine Clearance: 89 mL/min (based on SCr of 1 mg/dL). Assessment/Plan:  Will initiate vancomycin 1000 mg IV every 12 hours. Timing of trough level will be determined based on culture results, renal function, and clinical response. Thank you for the consult. Will continue to follow.     Electronically signed by Suresh Figueroa [facility] on 10/22/2021 at 1:55 PM

## 2021-10-22 NOTE — PROGRESS NOTES
Pre procedure marked complete per manager's instructions. Awaiting anesthesia to see and doctor to arrive and see patient.

## 2021-10-22 NOTE — PROGRESS NOTES
Steven Arechiga arrived to room # 329. Presented with: right carotid  Mental Status: Patient is oriented, alert and coherent. Vitals:    10/22/21 1300   BP: (!) 153/67   Pulse: 71   Resp: 17   Temp: 99.3 °F (37.4 °C)   SpO2: 95%     Patient safety contract and falls prevention contract reviewed with patient Yes. Oriented Patient to room. Call light within reach. Yes.   Needs, issues or concerns expressed at this time: no.      Electronically signed by Mic Abbasi RN on 10/22/2021 at 1:28 PM

## 2021-10-23 VITALS
WEIGHT: 240 LBS | TEMPERATURE: 97.5 F | DIASTOLIC BLOOD PRESSURE: 65 MMHG | OXYGEN SATURATION: 94 % | RESPIRATION RATE: 18 BRPM | BODY MASS INDEX: 29.84 KG/M2 | HEART RATE: 65 BPM | HEIGHT: 75 IN | SYSTOLIC BLOOD PRESSURE: 132 MMHG

## 2021-10-23 LAB
ANION GAP SERPL CALCULATED.3IONS-SCNC: 11 MMOL/L (ref 7–19)
BUN BLDV-MCNC: 12 MG/DL (ref 8–23)
CALCIUM SERPL-MCNC: 9.2 MG/DL (ref 8.8–10.2)
CHLORIDE BLD-SCNC: 102 MMOL/L (ref 98–111)
CO2: 24 MMOL/L (ref 22–29)
CREAT SERPL-MCNC: 0.8 MG/DL (ref 0.5–1.2)
GFR AFRICAN AMERICAN: >59
GFR NON-AFRICAN AMERICAN: >60
GLUCOSE BLD-MCNC: 131 MG/DL (ref 74–109)
POTASSIUM SERPL-SCNC: 4.5 MMOL/L (ref 3.5–5)
SODIUM BLD-SCNC: 137 MMOL/L (ref 136–145)

## 2021-10-23 PROCEDURE — 99024 POSTOP FOLLOW-UP VISIT: CPT | Performed by: SURGERY

## 2021-10-23 PROCEDURE — 6360000002 HC RX W HCPCS: Performed by: SURGERY

## 2021-10-23 PROCEDURE — 80048 BASIC METABOLIC PNL TOTAL CA: CPT

## 2021-10-23 PROCEDURE — 36415 COLL VENOUS BLD VENIPUNCTURE: CPT

## 2021-10-23 PROCEDURE — 6370000000 HC RX 637 (ALT 250 FOR IP): Performed by: SURGERY

## 2021-10-23 PROCEDURE — 2580000003 HC RX 258: Performed by: SURGERY

## 2021-10-23 RX ADMIN — SODIUM CHLORIDE, PRESERVATIVE FREE 10 ML: 5 INJECTION INTRAVENOUS at 07:40

## 2021-10-23 RX ADMIN — LISINOPRIL 5 MG: 5 TABLET ORAL at 07:39

## 2021-10-23 RX ADMIN — CARVEDILOL 6.25 MG: 6.25 TABLET, FILM COATED ORAL at 07:39

## 2021-10-23 RX ADMIN — Medication 1000 MG: at 03:49

## 2021-10-23 RX ADMIN — APIXABAN 5 MG: 5 TABLET, FILM COATED ORAL at 07:39

## 2021-10-23 RX ADMIN — AMIODARONE HYDROCHLORIDE 200 MG: 200 TABLET ORAL at 04:42

## 2021-10-23 NOTE — DISCHARGE SUMMARY
Physician Discharge Summary     Patient ID:  Segundo Kruse  [id number]  70 y.o.  1950    Admit date: 10/22/2021    Discharge date and time: 10/23/2021     Admitting Physician: Yuval Trujillo MD     Discharge Physician:  Izabela Case DO    Admission Diagnoses: Symptomatic stenosis of right carotid artery [I65.21]    Discharge Diagnoses:   Patient Active Problem List   Diagnosis    Mixed hyperlipidemia    [facility] ([facility] [address]    Essential hypertension    [facility] fibrillation ([facility]    Cardiomyopathy ([facility]    Retinal artery occlusion    Symptomatic stenosis of right carotid artery       Procedures during admission: Procedure(s) (LRB):  RIGHT CAROTID ENDARTERECTOMY (Right)    Admission Condition: good    Discharged Condition: good    Indication for Admission: symptomatic right carotid artery stenosis     Hospital Course: uneventful, monitored for 24h    Consults: none    Significant Diagnostic Studies: none    Treatments: surgery: right carotid endarterectomy    Disposition: home    Patient Instructions:   meds- see medication reconciliation   Activity: no lifting, Driving, or Strenuous exercise for 4 weeks  Diet: cardiac diet  Wound Care: keep wound clean and dry and shower daily    Follow-up with Dr. Deena Mack in 10 days.     Signed:  Izabela Case DO  10/23/2021  11:32 AM

## 2021-10-23 NOTE — PROGRESS NOTES
Vascular Surgery Ximena Burdick M.D. Daily Progress Note    Pt Name: Karime Sosa Record Number: [id number]  Date of Birth 1950   Today's Date: 10/23/2021    Chief Complaint:  No chief complaint on file. SUBJECTIVE:     Patient was seen and examined.   Pain is  controlled  No complaints     OBJECTIVE:     Patient Vitals for the past 24 hrs:   BP Temp Temp src Pulse Resp SpO2   10/23/21 0800 139/73 97.9 °F (36.6 °C) Temporal 80 18 97 %   10/23/21 0623 137/79 98.1 °F (36.7 °C) Temporal 74 16 96 %   10/23/21 0500 (!) 152/77   75     10/23/21 0411 (!) 165/82 97.8 °F (36.6 °C) Temporal 80 18 95 %   10/23/21 0217 (!) 143/86 98 °F (36.7 °C) Temporal 85 16 95 %   10/23/21 0000 (!) 150/78 98.8 °F (37.1 °C) Oral 88 18 92 %   10/22/21 2216 (!) 145/73 99.3 °F (37.4 °C)  94 16 92 %   10/22/21 2123 138/73 99 °F (37.2 °C) Oral 95 18 94 %   10/22/21 1830 (!) 161/79 98.8 °F (37.1 °C)  98 18 95 %   10/22/21 1630 (!) 154/76 98.6 °F (37 °C)  93 18 96 %   10/22/21 1545 (!) 165/83 96.4 °F (35.8 °C)  91 18 96 %   10/22/21 1300 (!) 153/67 99.3 °F (37.4 °C) Temporal 71 17 95 %   10/22/21 1245 (!) 158/71   72 14 95 %   10/22/21 1240    74 22 96 %   10/22/21 1235 (!) 152/73   71 12 95 %   10/22/21 1230    69 17 94 %   10/22/21 1225 (!) 146/63   69 17 95 %   10/22/21 1220    67 15 95 %   10/22/21 1215 (!) 147/63   64 18 95 %   10/22/21 1210    63 13 94 %   10/22/21 1205 (!) 156/73   62 15 94 %   10/22/21 1200    60 17 94 %   10/22/21 1155 (!) 161/70   59 13 93 %   10/22/21 1150    58 13 94 %   10/22/21 1145 (!) 154/71   60 13 93 %   10/22/21 1140    58 10 93 %   10/22/21 1135 (!) 145/73   60 14 92 %   10/22/21 1134  96.9 °F (36.1 °C) Temporal      10/22/21 1130 (!) 145/78 96.9 °F (36.1 °C) Temporal 61 (!) 5 93 %         Intake/Output Summary (Last 24 hours) at 10/23/2021 1127  Last data filed at 10/23/2021 0953  Gross per 24 hour   Intake 570 ml   Output 4450 ml examined  NEUROLOGIC: CN II-XII are grossly intact. There are no focalizing motor or sensory deficits  WOUND/INCISION:  Dressing c/d/i  Drain with minimal serosanguinous output    LABS:       CBC:   Recent Labs     10/22/21  1344   WBC 10.5   RBC 4.52*   HGB 14.8   HCT 44.4   MCV 98.2*   MCH 32.7*   MCHC 33.3   RDW 13.3      MPV 11.7      Last 3 CMP:   Recent Labs     10/23/21  0124      K 4.5      CO2 24   BUN 12   CREATININE 0.8   GLUCOSE 131*   CALCIUM 9.2      Troponin: No results for input(s): TROPONINI in the last 72 hours. Calcium:   Lab Results   Component Value Date    CALCIUM 9.2 10/23/2021    CALCIUM 9.0 10/20/2021    CALCIUM 9.1 10/04/2021      Ionized Calcium: No results found for: IONCA   Lipids: No results for input(s): CHOL, HDL in the last 72 hours. Invalid input(s): LDLCALCU  INR: No results for input(s): INR in the last 72 hours. Lactic Acid: No results found for: LACTA     CBC:   Lab Results   Component Value Date    WBC 10.5 10/22/2021    RBC 4.52 10/22/2021    HGB 14.8 10/22/2021    HGB 15.8 04/28/2011    HCT 44.4 10/22/2021    HCT 45.9 04/28/2011    MCV 98.2 10/22/2021    MCH 32.7 10/22/2021    MCHC 33.3 10/22/2021    RDW 13.3 10/22/2021     10/22/2021     04/28/2011    MPV 11.7 10/22/2021                 DVT prophylaxis: [] Lovenox                                 [x] SCDs                                 [] SQ Heparin                                 [] Encourage ambulation, low risk for DVT, no chemical or mechanical prophylaxis necessary              [] Already on Anticoagulation      RADIOLOGY:      No results found. ASSESSMENT:     POD # 1s/p right CEA  HD # 1  Active Hospital Problems    Diagnosis Date Noted    Symptomatic stenosis of right carotid artery [I65.21] 10/08/2021               PLAN:     Remove JABARI and change dressing  Ambulate, if ok - d/c home        Jeferson Kruger M.D.    Electronically signed 10/23/2021 at 11:28 AM

## 2021-10-26 ENCOUNTER — TELEPHONE (OUTPATIENT)
Dept: INTERNAL MEDICINE | Age: 71
End: 2021-10-26

## 2021-10-26 NOTE — TELEPHONE ENCOUNTER
[facility] Transitions Initial Follow Up Call    Outreach made within 2 business days of discharge: Yes    Patient: Fadumo [place]   Patient : 1950  MRN: [id number]   Reason for Admission: Admitted 10/22/21 for symptomatic stenosis of right carotid artery   Discharge Date: 10/23/21    Discharge Diagnoses:       Patient Active Problem List   Diagnosis    Mixed hyperlipidemia    [facility] [facility]    Essential hypertension    Atrial fibrillation ([facility] [facility]    Cardiomyopathy ([facility] [address] [facility]    Retinal artery occlusion    Symptomatic stenosis of right carotid artery        Spoke with: patient     Discharge department/facility: [facility]     TCM Interactive Patient Contact:  Was patient able to fill all prescriptions: Yes  Was patient instructed to bring all medications to the follow-up visit: Yes  Is patient taking all medications as directed in the discharge summary? Yes  Does patient understand their discharge instructions: Yes  Does patient have questions or concerns that need addressed prior to 7-14 day follow up office visit: no    I spoke with Mr. Anne Vidal. He states he is home and doing great. He states he is out riding his golf cart today. He reports he has not had any post operative pain and had not needed any pain medication. He states everything has went better than expected. He is still having vision problems but states he will follow back up with his opthalmologist next week. He states his appetite is good. Bowels and bladder are moving as usual. He denies any needs at this time and will follow up next week as scheduled.      Scheduled appointment with PCP within 7-14 days    Follow Up  Future Appointments   Date Time Provider Arnaud Rodriguez   11/3/2021 11:00 AM CHEIKH Melendez [facility]   2021  1:45 PM CHEIKH Nunes [facility]   2021  9:45 AM CHEIKH Melendez [facility]   2022  2:30 PM DO MAYRA Persaud [facility]   7/20/2022 10:00 AM Raguel Goodpasture, MD N [facility]       [place], [place]

## 2021-11-03 ENCOUNTER — VIRTUAL VISIT (OUTPATIENT)
Dept: FAMILY MEDICINE CLINIC | Age: 71
End: 2021-11-03
Payer: MEDICARE

## 2021-11-03 DIAGNOSIS — H34.9 RETINAL ARTERY OCCLUSION: ICD-10-CM

## 2021-11-03 DIAGNOSIS — I65.21 SYMPTOMATIC STENOSIS OF RIGHT CAROTID ARTERY: Primary | ICD-10-CM

## 2021-11-03 PROCEDURE — 99496 TRANSJ CARE MGMT HIGH F2F 7D: CPT | Performed by: CLINICAL NURSE SPECIALIST

## 2021-11-03 ASSESSMENT — ENCOUNTER SYMPTOMS
CONSTIPATION: 0
EYE PAIN: 0
ABDOMINAL PAIN: 0
VOMITING: 0
WHEEZING: 0
NAUSEA: 0
COLOR CHANGE: 0
CHEST TIGHTNESS: 0
SHORTNESS OF BREATH: 0
TROUBLE SWALLOWING: 0
BACK PAIN: 0
SORE THROAT: 0
COUGH: 0
SINUS PRESSURE: 0
EYE DISCHARGE: 0
DIARRHEA: 0

## 2021-11-03 NOTE — PROGRESS NOTES
SUBJECTIVE:  Cierra Vasquez is a 66 y.o. who presents today for Follow-Up from [address]     _______________________________________________________________    Due to COVID 23 outbreak, patient's office visit was converted to telephone virtual visit. Patient was contacted and agreed to proceed with a telephone virtual visit. The risks and benefits of converting to a telephone virtual visit were discussed in light of the current infectious disease epidemic. Patient also understood that insurance coverage and co-pays are up to their individual insurance plans. Mr Junior Mcgee was evaluated today via doxy. me for high complexity TCM visit. He admitted to [facility] on 10/22 with symptomatic right carotid artery stenosis. He underwent right carotid endarterectomy per Dr Estela Helton. Procedure went well, no post op complications. He was discharged home  10/23/21. His aspirin is on hold, but is on plavix for 3 weeks along with his Eliquis for afib. He is not having any issues with medications. No bleeding. No postop pain. He is up moving around, BM and diet ok. Has follow up with vascular tomorrow then ophthalmology later this week. He has ongoing loss of vision due to retinal artery occlusion/stroke. Medications reviewed today. TCM phone call was made within 2 days  Hospital follow up is within 7 business days. High complexity due to admission diagnosis, high risk for readmission, complication and comorbid conditions. He questions need for dental work-- recommend after full recovery and clearance from vascular will need details from oral surgery to determine if/when he can hold anticoagulation.      Medically, he needs mail box moved to his home side of the road- will need letter    Past Medical History:   Diagnosis Date    Atrial fibrillation [facility])     sees [facility]ology    CAD (coronary artery disease)     Carotid occlusion, bilateral     has affected Take 1 tablet by mouth 2 times daily 180 tablet 5     No current facility-administered medications for this visit. No Known Allergies    Review of Systems   Constitutional: Negative for appetite change, chills, diaphoresis, fatigue and fever. HENT: Negative for congestion, ear pain, hearing loss, postnasal drip, sinus pressure, sore throat and trouble swallowing. Eyes: Positive for visual disturbance. Negative for pain and discharge. Respiratory: Negative for cough, chest tightness, shortness of breath and wheezing. Cardiovascular: Negative for chest pain and palpitations. Gastrointestinal: Negative for abdominal pain, constipation, diarrhea, nausea and vomiting. Genitourinary: Negative for difficulty urinating, dysuria, flank pain, frequency, hematuria and urgency. Musculoskeletal: Negative for arthralgias, back pain, joint swelling and neck pain. Skin: Negative for color change and rash. Neurological: Negative for dizziness, syncope, weakness, light-headedness and headaches. Hematological: Negative for adenopathy. Does not bruise/bleed easily. Psychiatric/Behavioral: Negative for behavioral problems, confusion and dysphoric mood. The patient is not nervous/anxious. OBJECTIVE:  There were no vitals taken for this visit. Physical Exam  Constitutional:       General: He is not in acute distress. Appearance: He is not ill-appearing or toxic-appearing. Pulmonary:      Effort: Pulmonary effort is normal.      Breath sounds: No wheezing. Neurological:      Mental Status: He is alert and oriented to person, place, and time. Psychiatric:         Mood and Affect: Mood normal.         Behavior: Behavior normal.         Thought Content: Thought content normal.         Judgment: Judgment normal.         ASSESSMENT/PLAN:  1. Symptomatic stenosis of right carotid artery  S/p CEA  Hospital records reviewed  Continue restrictions, anticoag per vascular    2.  Retinal artery occlusion  Keep follow up with ophthalmology this week          Return for already scheduled.

## 2021-11-04 ENCOUNTER — OFFICE VISIT (OUTPATIENT)
Dept: VASCULAR SURGERY | Age: 71
End: 2021-11-04

## 2021-11-04 VITALS
SYSTOLIC BLOOD PRESSURE: 122 MMHG | DIASTOLIC BLOOD PRESSURE: 70 MMHG | HEART RATE: 56 BPM | OXYGEN SATURATION: 95 % | RESPIRATION RATE: 18 BRPM

## 2021-11-04 DIAGNOSIS — I65.23 BILATERAL CAROTID ARTERY STENOSIS: Primary | ICD-10-CM

## 2021-11-04 PROCEDURE — 99024 POSTOP FOLLOW-UP VISIT: CPT | Performed by: NURSE PRACTITIONER

## 2021-11-04 RX ORDER — AMOXICILLIN AND CLAVULANATE POTASSIUM 875; 125 MG/1; MG/1
1 TABLET, FILM COATED ORAL 2 TIMES DAILY
Qty: 14 TABLET | Refills: 0 | Status: SHIPPED | OUTPATIENT
Start: 2021-11-04 | End: 2021-11-11

## 2021-11-04 RX ORDER — AMOXICILLIN AND CLAVULANATE POTASSIUM 875; 125 MG/1; MG/1
1 TABLET, FILM COATED ORAL 2 TIMES DAILY
Qty: 14 TABLET | Refills: 0 | Status: SHIPPED | OUTPATIENT
Start: 2021-11-04 | End: 2021-11-04 | Stop reason: CLARIF

## 2021-11-04 NOTE — PROGRESS NOTES
Dana Arcos is a 66 y.o. male who presents for post op evaluation. Patient had a  carotid endarterectomy 2 weeks ago. This was performed by Dr. Shayan Corley. Post op symptoms reported none. Post op pain is minimal.  He does however have tooth pain and possible abscess. He is waiting to get into the dentist    On evaluation today, incision is clean, dry, intact. No signs of infection are present. Today we have recommended he Wash incision daily with soap and water and cover with dry gauze until healed. We will give augmentin until he gets into dentist. We have recommended continued ASA 81 mg po qd, clopidogrel 75 mg po qd daily. He can stop plavix next week. He will call us if any signs of infection develop. We will follow up with him in 3 months. This should bring you up to date on Dana Arcos  As always we want to thank you for allowing us to participate in the care of your patients.     Sincerely,    CHEIKH Parham

## 2021-11-10 RX ORDER — CLOPIDOGREL BISULFATE 75 MG/1
TABLET ORAL
Qty: 30 TABLET | Refills: 0 | Status: SHIPPED | OUTPATIENT
Start: 2021-11-10 | End: 2021-11-29 | Stop reason: SDUPTHER

## 2021-11-29 RX ORDER — CLOPIDOGREL BISULFATE 75 MG/1
TABLET ORAL
Qty: 90 TABLET | Refills: 1 | Status: SHIPPED | OUTPATIENT
Start: 2021-11-29 | End: 2021-12-08 | Stop reason: ALTCHOICE

## 2021-12-08 ENCOUNTER — OFFICE VISIT (OUTPATIENT)
Dept: FAMILY MEDICINE CLINIC | Age: 71
End: 2021-12-08
Payer: MEDICARE

## 2021-12-08 VITALS
RESPIRATION RATE: 18 BRPM | HEIGHT: 75 IN | TEMPERATURE: 98.1 F | WEIGHT: 245.6 LBS | SYSTOLIC BLOOD PRESSURE: 136 MMHG | BODY MASS INDEX: 30.54 KG/M2 | HEART RATE: 65 BPM | DIASTOLIC BLOOD PRESSURE: 74 MMHG | OXYGEN SATURATION: 97 %

## 2021-12-08 DIAGNOSIS — I10 ESSENTIAL HYPERTENSION: ICD-10-CM

## 2021-12-08 DIAGNOSIS — E78.2 MIXED HYPERLIPIDEMIA: Primary | ICD-10-CM

## 2021-12-08 DIAGNOSIS — Z12.5 ENCOUNTER FOR SCREENING FOR MALIGNANT NEOPLASM OF PROSTATE: ICD-10-CM

## 2021-12-08 DIAGNOSIS — I65.21 SYMPTOMATIC STENOSIS OF RIGHT CAROTID ARTERY: ICD-10-CM

## 2021-12-08 PROCEDURE — 1123F ACP DISCUSS/DSCN MKR DOCD: CPT | Performed by: CLINICAL NURSE SPECIALIST

## 2021-12-08 PROCEDURE — G8417 CALC BMI ABV UP PARAM F/U: HCPCS | Performed by: CLINICAL NURSE SPECIALIST

## 2021-12-08 PROCEDURE — 4040F PNEUMOC VAC/ADMIN/RCVD: CPT | Performed by: CLINICAL NURSE SPECIALIST

## 2021-12-08 PROCEDURE — G8484 FLU IMMUNIZE NO ADMIN: HCPCS | Performed by: CLINICAL NURSE SPECIALIST

## 2021-12-08 PROCEDURE — 99214 OFFICE O/P EST MOD 30 MIN: CPT | Performed by: CLINICAL NURSE SPECIALIST

## 2021-12-08 PROCEDURE — G8427 DOCREV CUR MEDS BY ELIG CLIN: HCPCS | Performed by: CLINICAL NURSE SPECIALIST

## 2021-12-08 PROCEDURE — 3017F COLORECTAL CA SCREEN DOC REV: CPT | Performed by: CLINICAL NURSE SPECIALIST

## 2021-12-08 PROCEDURE — 1036F TOBACCO NON-USER: CPT | Performed by: CLINICAL NURSE SPECIALIST

## 2021-12-08 RX ORDER — ATORVASTATIN CALCIUM 40 MG/1
40 TABLET, FILM COATED ORAL DAILY
Qty: 90 TABLET | Refills: 3
Start: 2021-12-08 | End: 2022-09-15

## 2021-12-08 RX ORDER — ASPIRIN 81 MG/1
81 TABLET ORAL DAILY
COMMUNITY

## 2021-12-08 ASSESSMENT — ENCOUNTER SYMPTOMS
SHORTNESS OF BREATH: 0
COLOR CHANGE: 0
SORE THROAT: 0
CONSTIPATION: 0
EYE DISCHARGE: 0
SINUS PRESSURE: 0
EYE PAIN: 0
WHEEZING: 0
COUGH: 0
NAUSEA: 0
CHEST TIGHTNESS: 0
ABDOMINAL PAIN: 0
DIARRHEA: 0
TROUBLE SWALLOWING: 0
VOMITING: 0
BACK PAIN: 0

## 2021-12-08 NOTE — PROGRESS NOTES
SUBJECTIVE:  Sofya Galicia is a 66 y.o. who presents today for Follow-up, Hypertension, and Hyperlipidemia      HPI    Mr Nicole Gonzales presents today for follow up. He has done well s/p right carotid endarterectomy. Off plavix, now on aspirin and eliquis. Statin was increased from 20 to 40mg, but has not made change yet. Ongoing visual defect right eye, unimproved. No other TIA symptoms. Has follow up with vascular in February. Blood pressure is at goal.  Compliant with med regimen. No dizziness or syncope. No swelling. No afib episodes.      Past Medical History:   Diagnosis Date    Atrial fibrillation [facility])     sees [facility] cardiology    CAD (coronary artery disease)     Carotid occlusion, bilateral     has affected vision recently    Erectile dysfunction     Hypertension     Mixed hyperlipidemia     Osteoarthritis     Prostate cancer [facility])      Past Surgical History:   Procedure Laterality Date    CARDIAC CATHETERIZATION  2020    EF 35%, moderate diffuse disease in LAD    CARDIOVERSION      CAROTID ENDARTERECTOMY Right 10/22/2021    RIGHT CAROTID ENDARTERECTOMY performed by Bryan Bernstein MD at [address] [place] [address] CATH LAB PROCEDURE  2020    HIP SURGERY      JOINT REPLACEMENT      KNEE SURGERY      replacement    PROSTATECTOMY       Family History   Problem Relation Age of Onset    Hypertension Mother     Coronary Art Dis Mother     Coronary Art Dis Father     Mult Sclerosis Sister      Social History     Tobacco Use    Smoking status: Former Smoker     Packs/day: 0.25     Years: 10.00     Pack years: 2.50     Types: Cigars     Quit date: 1975     Years since quittin.2    Smokeless tobacco: Former User     Types: Chew   Substance Use Topics    Alcohol use: Yes     Comment: social     Current Outpatient Medications   Medication Sig Dispense Refill    aspirin 81 MG EC tablet Take 81 mg by mouth daily      atorvastatin (LIPITOR) 40 MG tablet Take 1 is well-developed. He is not ill-appearing or toxic-appearing. HENT:      Head: Normocephalic and atraumatic. Eyes:      General:         Right eye: No discharge. Left eye: No discharge. Conjunctiva/sclera: Conjunctivae normal.      Pupils: Pupils are equal, round, and reactive to light. Neck:      Thyroid: No thyromegaly. Trachea: No tracheal deviation. Cardiovascular:      Rate and Rhythm: Normal rate and regular rhythm. Heart sounds: No murmur heard. Pulmonary:      Effort: Pulmonary effort is normal. No respiratory distress. Breath sounds: Normal breath sounds. No wheezing or rales. Genitourinary:     Penis: No tenderness. Musculoskeletal:         General: No deformity. Normal range of motion. Cervical back: Normal range of motion and neck supple. Lymphadenopathy:      Cervical: No cervical adenopathy. Skin:     General: Skin is warm and dry. Findings: No erythema or rash. Neurological:      Mental Status: He is alert and oriented to person, place, and time. Mental status is at baseline. Psychiatric:         Mood and Affect: Mood normal.         Behavior: Behavior normal.         Thought Content: Thought content normal.         Judgment: Judgment normal.         ASSESSMENT/PLAN:  1. Mixed hyperlipidemia  Stable, continue statin  Update labs 6 months  - atorvastatin (LIPITOR) 40 MG tablet; Take 1 tablet by mouth daily  Dispense: 90 tablet; Refill: 3  - CBC Auto Differential; Future  - Comprehensive Metabolic Panel; Future  - Lipid Panel; Future    2. Symptomatic stenosis of right carotid artery  S/p endarterectomy- keep follow up with vascular    3. Essential hypertension  Controlled, continue same  - CBC Auto Differential; Future  - Comprehensive Metabolic Panel; Future  - Urinalysis with Microscopic; Future    4. Encounter for screening for malignant neoplasm of prostate  - PSA screening; Future  - Urinalysis with Microscopic;  Future Return in about 6 months (around 6/8/2022) for Annual Wellness Visit, Labs one week prior. Emperatriz Dang

## 2022-01-04 DIAGNOSIS — I48.91 ATRIAL FIBRILLATION, UNSPECIFIED TYPE (HCC): ICD-10-CM

## 2022-01-04 RX ORDER — APIXABAN 5 MG/1
TABLET, FILM COATED ORAL
Qty: 180 TABLET | Refills: 0 | Status: SHIPPED | OUTPATIENT
Start: 2022-01-04 | End: 2022-04-04

## 2022-02-11 ENCOUNTER — HOSPITAL ENCOUNTER (OUTPATIENT)
Dept: VASCULAR LAB | Age: 72
Discharge: HOME | End: 2022-02-11
Payer: MEDICARE

## 2022-02-11 DIAGNOSIS — I65.23 BILATERAL CAROTID ARTERY STENOSIS: ICD-10-CM

## 2022-02-11 PROCEDURE — 93880 EXTRACRANIAL BILAT STUDY: CPT

## 2022-02-15 ENCOUNTER — OFFICE VISIT (OUTPATIENT)
Dept: VASCULAR SURGERY | Age: 72
End: 2022-02-15
Payer: MEDICARE

## 2022-02-15 VITALS
HEART RATE: 54 BPM | TEMPERATURE: 97.7 F | DIASTOLIC BLOOD PRESSURE: 62 MMHG | BODY MASS INDEX: 30.7 KG/M2 | SYSTOLIC BLOOD PRESSURE: 120 MMHG | HEIGHT: 75 IN | OXYGEN SATURATION: 97 %

## 2022-02-15 DIAGNOSIS — I65.23 BILATERAL CAROTID ARTERY STENOSIS: Primary | ICD-10-CM

## 2022-02-15 PROCEDURE — G8417 CALC BMI ABV UP PARAM F/U: HCPCS | Performed by: NURSE PRACTITIONER

## 2022-02-15 PROCEDURE — 99213 OFFICE O/P EST LOW 20 MIN: CPT | Performed by: NURSE PRACTITIONER

## 2022-02-15 PROCEDURE — 1036F TOBACCO NON-USER: CPT | Performed by: NURSE PRACTITIONER

## 2022-02-15 PROCEDURE — G8427 DOCREV CUR MEDS BY ELIG CLIN: HCPCS | Performed by: NURSE PRACTITIONER

## 2022-02-15 PROCEDURE — G8484 FLU IMMUNIZE NO ADMIN: HCPCS | Performed by: NURSE PRACTITIONER

## 2022-02-15 PROCEDURE — 4040F PNEUMOC VAC/ADMIN/RCVD: CPT | Performed by: NURSE PRACTITIONER

## 2022-02-15 PROCEDURE — 3017F COLORECTAL CA SCREEN DOC REV: CPT | Performed by: NURSE PRACTITIONER

## 2022-02-15 PROCEDURE — 1123F ACP DISCUSS/DSCN MKR DOCD: CPT | Performed by: NURSE PRACTITIONER

## 2022-02-15 NOTE — PROGRESS NOTES
Deann Harrington (:  1950) is a 66 y.o. male,Established patient, here for evaluation of the following chief complaint(s):  Carotid Disease (Bilateral carotid artery stenosis)            SUBJECTIVE/OBJECTIVE:  He presents for follow up of carotid artery stenosis. He has a known history of carotid artery stenosis for 1 - 5 years. His current treatment includes ASA EC daily. He denies a history of CVA. He reports has not had TIA's, episodes of lateralizing weakness and episodes of amaurosis fugax. Deann Harrington is a 66 y.o. male with the following history as [facility] in [facility]:  Patient Active Problem List    Diagnosis Date Noted    Cardiomyopathy [facility])     Retinal artery occlusion 10/08/2021    Symptomatic stenosis of right carotid artery 10/08/2021    Atrial fibrillation ([facility] [address] 10/14/2020    Essential hypertension 2018    Prostate cancer (HCC)     Mixed hyperlipidemia      Current Outpatient Medications   Medication Sig Dispense Refill    ELIQUIS 5 MG TABS tablet TAKE 1 TABLET TWICE A  tablet 0    aspirin 81 MG EC tablet Take 81 mg by mouth daily      atorvastatin (LIPITOR) 40 MG tablet Take 1 tablet by mouth daily 90 tablet 3    lisinopril (PRINIVIL;ZESTRIL) 10 MG tablet TAKE 1 TABLET DAILY 90 tablet 3    amiodarone (CORDARONE) 200 MG tablet TAKE 1 TABLET BY MOUTH TWICE A DAY FOR 10 DAYS THEN TAKE 1 TABLET BY MOUTH EVERY DAY (Patient taking differently: Take 200 mg by mouth 2 times daily ) 120 tablet 1    carvedilol (COREG) 6.25 MG tablet Take 1 tablet by mouth 2 times daily 180 tablet 5     No current facility-administered medications for this visit. Allergies: Patient has no known allergies.   Past Medical History:   Diagnosis Date    Atrial fibrillation [facility])     sees [facility] cardiology    CAD (coronary artery disease)     Carotid occlusion, bilateral     has affected vision recently    Erectile dysfunction     Hypertension     Mixed hyperlipidemia     Osteoarthritis     Prostate cancer [facility])      Past Surgical History:   Procedure Laterality Date    CARDIAC CATHETERIZATION  2020    EF 35%, moderate diffuse disease in LAD    CARDIOVERSION      CAROTID ENDARTERECTOMY Right 10/22/2021    RIGHT CAROTID ENDARTERECTOMY with bovine pericardial patch. Right cervical carotid arteriograms. performed by King Heard MD at [address] [facility] CATH LAB PROCEDURE  2020    HIP SURGERY      JOINT REPLACEMENT      KNEE SURGERY      replacement    PROSTATECTOMY       Family History   Problem Relation Age of Onset    Hypertension Mother     Coronary Art Dis Mother     Coronary Art Dis Father     Mult Sclerosis Sister      Social History     Tobacco Use    Smoking status: Former Smoker     Packs/day: 0.25     Years: 10.00     Pack years: 2.50     Types: Cigars     Quit date: 1975     Years since quittin.4    Smokeless tobacco: Former User     Types: Chew   Substance Use Topics    Alcohol use: Yes     Comment: social         Eyes  no sudden vision change or amaurosis. Respiratory  no significant shortness of breath,  Cardiovascular  no chest pain or syncope. No  significant leg swelling. no claudication. Musculoskeletal  no gait disturbance  Skin  no new wound. Neurologic   No speech difficulty or lateralizing weakness. All other review of systems are negative. Physical Exam    /62   Pulse 54   Temp 97.7 °F (36.5 °C)   Ht 6' 3\" (1.905 m)   SpO2 97%   BMI 30.70 kg/m²       Neck- carotid pulses 2+ to palpation with no bruit  Cardiovascular  Regular rate and rhythm. Pulmonary  effort appears normal.  No respiratory distress. Lungs - Breath sounds normal. No wheezes or rales. Extremities - without edema   Neurologic  alert and oriented X 3. Physiologic. Face symmetric. Skin  warm, dry, and intact.   no wound  Psychiatric  mood, affect, and behavior appear normal.  Judgment and thought processes appear normal.    Risk factors for atherosclerosis of all vascular beds have been reviewed with the patient including:  Family history, tobacco abuse in all forms, elevated cholesterol, hyperlipidemia, and diabetes. Doppler results:    Right CCA/ICA <50% stenotic  Left CCA/ICA <50% stenotic  Right verterbral artery flow is antegrade  Left verterbral artery flow is antegrade  Individual velocities reviewed: Yes. Results were reviewed with the patient. Disease process is stable and chronic      Reviewed previous studies including: carotid u/s  Individual images were reviewed. I agree with the findings  Results were discussed with the patient. ASSESSMENT/PLAN:  1. Bilateral carotid artery stenosis  -     VL DUP CAROTID BILATERAL; Future        Discussed management of carotid u/s which includes:  continue asa to reduce risk of TIA/CVA, to reduce risk of arterial thrombosis and to decrease rate of plaque buildup  Strongly encourage start/continue statin therapy -   Recommend no smoking - discussed the effect tobacco has on illness;   Proceed with 12 months with cvls         Patient instructed to call or proceed to the emergency room with any symptoms of lateralizing weakness, loss of vision in one eye, or episodes slurred speech. An electronic signature was used to authenticate this note.     --CHEIKH Hodgson

## 2022-02-28 RX ORDER — CARVEDILOL 6.25 MG/1
TABLET ORAL
Qty: 180 TABLET | Refills: 5 | Status: SHIPPED | OUTPATIENT
Start: 2022-02-28

## 2022-04-04 DIAGNOSIS — I48.91 ATRIAL FIBRILLATION, UNSPECIFIED TYPE (HCC): ICD-10-CM

## 2022-04-04 RX ORDER — APIXABAN 5 MG/1
TABLET, FILM COATED ORAL
Qty: 180 TABLET | Refills: 3 | Status: SHIPPED | OUTPATIENT
Start: 2022-04-04

## 2022-04-14 RX ORDER — AMIODARONE HYDROCHLORIDE 200 MG/1
200 TABLET ORAL DAILY
Qty: 90 TABLET | Refills: 3 | Status: SHIPPED | OUTPATIENT
Start: 2022-04-14

## 2022-05-05 ENCOUNTER — TELEPHONE (OUTPATIENT)
Dept: FAMILY MEDICINE CLINIC | Age: 72
End: 2022-05-05

## 2022-05-31 RX ORDER — CLOPIDOGREL BISULFATE 75 MG/1
TABLET ORAL
Qty: 90 TABLET | Refills: 1 | OUTPATIENT
Start: 2022-05-31

## 2022-05-31 NOTE — TELEPHONE ENCOUNTER
Spoke with patient to let him know he requested a medication that has been discontinued. Patient acknowledged and was aware.

## 2022-06-03 RX ORDER — CLOPIDOGREL BISULFATE 75 MG/1
TABLET ORAL
COMMUNITY
Start: 2022-02-27 | End: 2022-06-08 | Stop reason: ALTCHOICE

## 2022-06-06 DIAGNOSIS — Z12.5 ENCOUNTER FOR SCREENING FOR MALIGNANT NEOPLASM OF PROSTATE: ICD-10-CM

## 2022-06-06 DIAGNOSIS — I10 ESSENTIAL HYPERTENSION: ICD-10-CM

## 2022-06-06 DIAGNOSIS — E78.2 MIXED HYPERLIPIDEMIA: ICD-10-CM

## 2022-06-06 LAB
ALBUMIN SERPL-MCNC: 3.9 G/DL (ref 3.5–5.2)
ALP BLD-CCNC: 68 U/L (ref 40–130)
ALT SERPL-CCNC: 13 U/L (ref 5–41)
ANION GAP SERPL CALCULATED.3IONS-SCNC: 13 MMOL/L (ref 7–19)
AST SERPL-CCNC: 17 U/L (ref 5–40)
BACTERIA: NEGATIVE /HPF
BASOPHILS ABSOLUTE: 0 K/UL (ref 0–0.2)
BASOPHILS RELATIVE PERCENT: 0.7 % (ref 0–1)
BILIRUB SERPL-MCNC: 1 MG/DL (ref 0.2–1.2)
BILIRUBIN URINE: NEGATIVE
BLOOD, URINE: NEGATIVE
BUN BLDV-MCNC: 18 MG/DL (ref 8–23)
CALCIUM SERPL-MCNC: 8.8 MG/DL (ref 8.8–10.2)
CHLORIDE BLD-SCNC: 103 MMOL/L (ref 98–111)
CHOLESTEROL, TOTAL: 138 MG/DL (ref 160–199)
CLARITY: CLEAR
CO2: 25 MMOL/L (ref 22–29)
COLOR: YELLOW
CREAT SERPL-MCNC: 1.1 MG/DL (ref 0.5–1.2)
CRYSTALS, UA: ABNORMAL /HPF
EOSINOPHILS ABSOLUTE: 0.1 K/UL (ref 0–0.6)
EOSINOPHILS RELATIVE PERCENT: 0.9 % (ref 0–5)
EPITHELIAL CELLS, UA: 0 /HPF (ref 0–5)
GFR AFRICAN AMERICAN: >59
GFR NON-AFRICAN AMERICAN: >60
GLUCOSE BLD-MCNC: 97 MG/DL (ref 74–109)
GLUCOSE URINE: NEGATIVE MG/DL
HCT VFR BLD CALC: 44.9 % (ref 42–52)
HDLC SERPL-MCNC: 53 MG/DL (ref 55–121)
HEMOGLOBIN: 14.5 G/DL (ref 14–18)
HYALINE CASTS: 0 /HPF (ref 0–8)
IMMATURE GRANULOCYTES #: 0 K/UL
KETONES, URINE: NEGATIVE MG/DL
LDL CHOLESTEROL CALCULATED: 63 MG/DL
LEUKOCYTE ESTERASE, URINE: NEGATIVE
LYMPHOCYTES ABSOLUTE: 1.5 K/UL (ref 1.1–4.5)
LYMPHOCYTES RELATIVE PERCENT: 27.8 % (ref 20–40)
MCH RBC QN AUTO: 32.2 PG (ref 27–31)
MCHC RBC AUTO-ENTMCNC: 32.3 G/DL (ref 33–37)
MCV RBC AUTO: 99.8 FL (ref 80–94)
MONOCYTES ABSOLUTE: 0.7 K/UL (ref 0–0.9)
MONOCYTES RELATIVE PERCENT: 11.8 % (ref 0–10)
NEUTROPHILS ABSOLUTE: 3.2 K/UL (ref 1.5–7.5)
NEUTROPHILS RELATIVE PERCENT: 58.4 % (ref 50–65)
NITRITE, URINE: NEGATIVE
PDW BLD-RTO: 14.7 % (ref 11.5–14.5)
PH UA: 5 (ref 5–8)
PLATELET # BLD: 185 K/UL (ref 130–400)
PMV BLD AUTO: 12.1 FL (ref 9.4–12.4)
POTASSIUM SERPL-SCNC: 4.2 MMOL/L (ref 3.5–5)
PROSTATE SPECIFIC ANTIGEN: 0.01 NG/ML (ref 0–4)
PROTEIN UA: NEGATIVE MG/DL
RBC # BLD: 4.5 M/UL (ref 4.7–6.1)
RBC UA: 1 /HPF (ref 0–4)
SODIUM BLD-SCNC: 141 MMOL/L (ref 136–145)
SPECIFIC GRAVITY UA: 1.02 (ref 1–1.03)
TOTAL PROTEIN: 6.7 G/DL (ref 6.6–8.7)
TRIGL SERPL-MCNC: 111 MG/DL (ref 0–149)
UROBILINOGEN, URINE: 1 E.U./DL
WBC # BLD: 5.5 K/UL (ref 4.8–10.8)
WBC UA: 1 /HPF (ref 0–5)

## 2022-06-08 ENCOUNTER — OFFICE VISIT (OUTPATIENT)
Dept: FAMILY MEDICINE CLINIC | Age: 72
End: 2022-06-08
Payer: MEDICARE

## 2022-06-08 VITALS
HEART RATE: 64 BPM | WEIGHT: 249.6 LBS | OXYGEN SATURATION: 97 % | TEMPERATURE: 97 F | BODY MASS INDEX: 31.04 KG/M2 | DIASTOLIC BLOOD PRESSURE: 84 MMHG | HEIGHT: 75 IN | SYSTOLIC BLOOD PRESSURE: 130 MMHG

## 2022-06-08 DIAGNOSIS — H93.12 SUBJECTIVE TINNITUS OF LEFT EAR: ICD-10-CM

## 2022-06-08 DIAGNOSIS — E78.2 MIXED HYPERLIPIDEMIA: ICD-10-CM

## 2022-06-08 DIAGNOSIS — I65.21 SYMPTOMATIC STENOSIS OF RIGHT CAROTID ARTERY: ICD-10-CM

## 2022-06-08 DIAGNOSIS — Z00.00 WELCOME TO MEDICARE PREVENTIVE VISIT: Primary | ICD-10-CM

## 2022-06-08 DIAGNOSIS — I10 ESSENTIAL HYPERTENSION: ICD-10-CM

## 2022-06-08 DIAGNOSIS — H61.22 IMPACTED CERUMEN OF LEFT EAR: ICD-10-CM

## 2022-06-08 PROCEDURE — G0439 PPPS, SUBSEQ VISIT: HCPCS | Performed by: FAMILY MEDICINE

## 2022-06-08 PROCEDURE — 3017F COLORECTAL CA SCREEN DOC REV: CPT | Performed by: FAMILY MEDICINE

## 2022-06-08 PROCEDURE — 1123F ACP DISCUSS/DSCN MKR DOCD: CPT | Performed by: FAMILY MEDICINE

## 2022-06-08 ASSESSMENT — PATIENT HEALTH QUESTIONNAIRE - PHQ9
SUM OF ALL RESPONSES TO PHQ QUESTIONS 1-9: 0
1. LITTLE INTEREST OR PLEASURE IN DOING THINGS: 0
SUM OF ALL RESPONSES TO PHQ9 QUESTIONS 1 & 2: 0
SUM OF ALL RESPONSES TO PHQ QUESTIONS 1-9: 0
SUM OF ALL RESPONSES TO PHQ QUESTIONS 1-9: 0
2. FEELING DOWN, DEPRESSED OR HOPELESS: 0
SUM OF ALL RESPONSES TO PHQ QUESTIONS 1-9: 0

## 2022-06-08 ASSESSMENT — LIFESTYLE VARIABLES
HOW OFTEN DURING THE LAST YEAR HAVE YOU HAD A FEELING OF GUILT OR REMORSE AFTER DRINKING: 0
HOW OFTEN DURING THE LAST YEAR HAVE YOU FOUND THAT YOU WERE NOT ABLE TO STOP DRINKING ONCE YOU HAD STARTED: 0
HOW OFTEN DURING THE LAST YEAR HAVE YOU FAILED TO DO WHAT WAS NORMALLY EXPECTED FROM YOU BECAUSE OF DRINKING: 0
HOW MANY STANDARD DRINKS CONTAINING ALCOHOL DO YOU HAVE ON A TYPICAL DAY: 1 OR 2
HAVE YOU OR SOMEONE ELSE BEEN INJURED AS A RESULT OF YOUR DRINKING: 0
HOW OFTEN DO YOU HAVE A DRINK CONTAINING ALCOHOL: 2-4 TIMES A MONTH
HAS A RELATIVE, FRIEND, DOCTOR, OR ANOTHER HEALTH PROFESSIONAL EXPRESSED CONCERN ABOUT YOUR DRINKING OR SUGGESTED YOU CUT DOWN: 0
HOW OFTEN DURING THE LAST YEAR HAVE YOU BEEN UNABLE TO REMEMBER WHAT HAPPENED THE NIGHT BEFORE BECAUSE YOU HAD BEEN DRINKING: 0
HOW OFTEN DO YOU HAVE A DRINK CONTAINING ALCOHOL: 2-4 TIMES A MONTH
HOW MANY STANDARD DRINKS CONTAINING ALCOHOL DO YOU HAVE ON A TYPICAL DAY: 1 OR 2
HOW OFTEN DURING THE LAST YEAR HAVE YOU NEEDED AN ALCOHOLIC DRINK FIRST THING IN THE MORNING TO GET YOURSELF GOING AFTER A NIGHT OF HEAVY DRINKING: 0

## 2022-06-08 NOTE — PROGRESS NOTES
Medicare Annual Wellness Visit    Rom Argueta is here for Medicare AWV    Assessment & Plan   Welcome to Medicare preventive visit  Mixed hyperlipidemia  Symptomatic stenosis of right carotid artery  Essential hypertension  Impacted cerumen of left ear  -     carbamide peroxide (DEBROX) 6.5 % otic solution; Place 5 drops in ear(s) 2 times daily for 7 days, Disp-15 mL, R-0Normal  Subjective tinnitus of left ear      Recommendations for Preventive Services Due: see orders and patient instructions/AVS.  Recommended screening schedule for the next 5-10 years is provided to the patient in written form: see Patient Instructions/AVS.     Return in about 6 months (around 12/8/2022) for HTN, in office. Subjective     The following acute and/or chronic problems were also addressed today:    Patient presents establish care, he was previously seen by [facility]. He also follows with vascular surgery and cardiology for carotid artery stenosis and CAD respectively, notes reviewed. He is doing well overall. He does have some discomfort of his left ear \" like a tickle. \"  He does not need any refills at this time. Patient's complete Health Risk Assessment and screening values have been reviewed and are found in Flowsheets. The following problems were reviewed today and where indicated follow up appointments were made and/or referrals ordered.     Positive Risk Factor Screenings with Interventions:         Substance Use - Alcohol Interventions:  2 drinks tops          General Health and ACP:  General  In general, how would you say your health is?: Fair  In the past 7 days, have you experienced any of the following: New or Increased Pain, New or Increased Fatigue, Loneliness, Social Isolation, Stress or Anger?: No  Do you get the social and emotional support that you need?: Yes  Do you have a Living Will?: (!) No    Advance Directives     Power of  Living Will ACP-Advance Directive ACP-Power of [employer] Not on File Not on File Not on File Not on File      General Health Risk Interventions:  · No Living Will: Patient declines ACP discussion/assistance and sons HCDM    Health Habits/Nutrition:     Physical Activity: Inactive    Days of Exercise per Week: 7 days    Minutes of Exercise per Session: more than 60 min (yard work, garage)     Have you lost any weight without trying in the past 3 months?: No  Body mass index: (!) 31.19  Have you seen the dentist within the past year?: Yes    Health Habits/Nutrition Interventions:  · n/a    Hearing/Vision:  Do you or your family notice any trouble with your hearing that hasn't been managed with hearing aids?: (!) Yes  Do you have difficulty driving, watching TV, or doing any of your daily activities because of your eyesight?: No  Have you had an eye exam within the past year?: Yes  No exam data present    Hearing/Vision Interventions:  · Hearing concerns:  may file USEC claim    Safety:  Do you have working smoke detectors?: Yes  Do you have any tripping hazards - loose or unsecured carpets or rugs?: No  Do you have any tripping hazards - clutter in doorways, halls, or stairs?: No  Do you have either shower bars, grab bars, non-slip mats or non-slip surfaces in your shower or bathtub?: Yes  Do all of your stairways have a railing or banister?: N/A - only 1 step  Do you always fasten your seatbelt when you are in a car?: Yes    Safety Interventions:  · N/A           Objective   Vitals:    06/08/22 1120   BP: 130/84   Pulse: 64   Temp: 97 °F (36.1 °C)   SpO2: 97%   Weight: 249 lb 9.6 oz (113.2 kg)   Height: 6' 3\" (1.905 m)      Body mass index is 31.2 kg/m².         General Appearance: alert and oriented to person, place and time, well developed and well- nourished, in no acute distress  Skin: warm and dry, no rash or erythema  Head: normocephalic and atraumatic  Eyes: pupils equal, round, and reactive to light, extraocular eye movements intact, conjunctivae normal  ENT: tympanic membrane, external ear bilaterally, nose without deformity, nasal mucosa and turbinates normal without polyps  Neck: supple and non-tender without mass, no thyromegaly or thyroid nodules, no cervical lymphadenopathy +cerumen left  Pulmonary/Chest: clear to auscultation bilaterally- no wheezes, rales or rhonchi, normal air movement, no respiratory distress  Cardiovascular: normal rate, regular rhythm, normal S1 and S2, no murmurs, rubs, clicks, or gallops, distal pulses intact, no carotid bruits  Abdomen: soft, non-tender, non-distended, normal bowel sounds, no masses or organomegaly  Extremities: no cyanosis, clubbing or edema  Musculoskeletal: normal range of motion, no joint swelling, deformity or tenderness  Neurologic: reflexes normal and symmetric, no cranial nerve deficit, gait, coordination and speech normal       No Known Allergies  Prior to Visit Medications    Medication Sig Taking?  Authorizing Provider   carbamide peroxide (DEBROX) 6.5 % otic solution Place 5 drops in ear(s) 2 times daily for 7 days Yes Alecia Leger MD   amiodarone (CORDARONE) 200 MG tablet Take 1 tablet by mouth daily  CHEIKH Li   ELIQUIS 5 MG TABS tablet TAKE 1 TABLET TWICE A DAY  CHEIKH Kay   carvedilol (COREG) 6.25 MG tablet TAKE 1 TABLET BY MOUTH TWICE A DAY  CHEIKH Mcqueen NP   aspirin 81 MG EC tablet Take 81 mg by mouth daily  Historical Provider, MD   atorvastatin (LIPITOR) 40 MG tablet Take 1 tablet by mouth daily  CHEIKH Mariano   lisinopril (PRINIVIL;ZESTRIL) 10 MG tablet TAKE 1 TABLET DAILY  CHEIKH Mariano       CareTeam (Including outside providers/suppliers regularly involved in providing care):   Patient Care Team:  Alecia Leger MD as PCP - General (Family Medicine)  Alecia Leger MD as PCP - [address] [place] [address] [facility]  CHEIKH Mcqueen NP as Nurse Practitioner (Nurse Practitioner Adult Health)  Beth Gaines MD as Consulting Physician (Vascular Surgery)  Naida Alvarengao Yuli De La Paz DO as Consulting Physician (Neurology)     Reviewed and updated this visit:  Tobacco  Allergies  Meds  Med Hx  Surg Hx  Soc Hx  Fam Hx

## 2022-07-08 ENCOUNTER — TELEMEDICINE (OUTPATIENT)
Dept: FAMILY MEDICINE CLINIC | Age: 72
End: 2022-07-08
Payer: MEDICARE

## 2022-07-08 DIAGNOSIS — U07.1 COVID-19: Primary | ICD-10-CM

## 2022-07-08 PROCEDURE — 99214 OFFICE O/P EST MOD 30 MIN: CPT | Performed by: FAMILY MEDICINE

## 2022-07-08 PROCEDURE — 1123F ACP DISCUSS/DSCN MKR DOCD: CPT | Performed by: FAMILY MEDICINE

## 2022-07-08 RX ORDER — METHYLPREDNISOLONE 4 MG/1
TABLET ORAL
Qty: 1 KIT | Refills: 0 | Status: SHIPPED | OUTPATIENT
Start: 2022-07-08

## 2022-07-08 RX ORDER — BENZONATATE 200 MG/1
200 CAPSULE ORAL 3 TIMES DAILY PRN
Qty: 30 CAPSULE | Refills: 0 | Status: SHIPPED | OUTPATIENT
Start: 2022-07-08 | End: 2022-07-15

## 2022-07-08 RX ORDER — ALBUTEROL SULFATE 90 UG/1
2 AEROSOL, METERED RESPIRATORY (INHALATION) 4 TIMES DAILY PRN
Qty: 18 G | Refills: 5 | Status: SHIPPED | OUTPATIENT
Start: 2022-07-08

## 2022-07-08 ASSESSMENT — ENCOUNTER SYMPTOMS
EYE DISCHARGE: 0
APNEA: 0
COUGH: 1
EYE ITCHING: 0
NAUSEA: 0
STRIDOR: 0
FACIAL SWELLING: 0
SORE THROAT: 1
COLOR CHANGE: 0
BACK PAIN: 0
RHINORRHEA: 1
VOMITING: 0

## 2022-07-08 NOTE — PROGRESS NOTES
Rocio Griggs (:  1950) is a Established patient, here for evaluation of the following:    Assessment & Plan   Below is the assessment and plan developed based on review of pertinent history, physical exam, labs, studies, and medications. 1. COVID-19  -     benzonatate (TESSALON) 200 MG capsule; Take 1 capsule by mouth 3 times daily as needed for Cough, Disp-30 capsule, R-0Normal  -     methylPREDNISolone (MEDROL DOSEPACK) 4 MG tablet; Take by mouth., Disp-1 kit, R-0Normal    He is within the window for Paxlovid, however it does interact with both his Eliquis and amiodarone. As it is nearly 5:00 on a Friday (patient logged in 30 minutes after his appointment time), I anticipate it would be difficult to get ahold of his cardiologist therefore will forego Paxlovid and do medications as above. He voiced understanding. Return if symptoms worsen or fail to improve. Subjective   HPI   Patient presents with cough, sore throat and runny nose x 4 days. He states his wife tested positive Monday, he did a couple rapid tests which were negative however his test today was positive also. He is wondering what he can take for his symptoms. He states his wife was given steroids, cough medicine, and albuterol. Review of Systems   Constitutional: Negative for chills and fever. HENT: Positive for rhinorrhea and sore throat. Negative for facial swelling and mouth sores. Eyes: Negative for discharge and itching. Respiratory: Positive for cough. Negative for apnea and stridor. Cardiovascular: Negative for chest pain and palpitations. Gastrointestinal: Negative for nausea and vomiting. Endocrine: Negative for cold intolerance and heat intolerance. Genitourinary: Negative for frequency and urgency. Musculoskeletal: Negative for arthralgias and back pain. Skin: Negative for color change and rash.           Objective   Patient-Reported Vitals  No data recorded     Physical Exam  [INSTRUCTIONS: \"[x]\" Indicates a positive item  \"[]\" Indicates a negative item  -- DELETE ALL ITEMS NOT EXAMINED]    Constitutional: [x] Appears well-developed and well-nourished [x] No apparent distress      [] Abnormal -     Mental status: [x] Alert and awake  [x] Oriented to person/place/time [x] Able to follow commands    [] Abnormal -     Eyes:   EOM    [x]  Normal    [] Abnormal -   Sclera  [x]  Normal    [] Abnormal -          Discharge [x]  None visible   [] Abnormal -     HENT: [x] Normocephalic, atraumatic  [] Abnormal -   [x] Mouth/Throat: Mucous membranes are moist    External Ears [x] Normal  [] Abnormal -    Neck: [x] No visualized mass [] Abnormal -     Pulmonary/Chest: [x] Respiratory effort normal   [x] No visualized signs of difficulty breathing or respiratory distress        [] Abnormal -      Musculoskeletal:   [x] Normal gait with no signs of ataxia         [x] Normal range of motion of neck        [] Abnormal -     Neurological:        [x] No Facial Asymmetry (Cranial nerve 7 motor function) (limited exam due to video visit)          [x] No gaze palsy        [] Abnormal -          Skin:        [x] No significant exanthematous lesions or discoloration noted on facial skin         [] Abnormal -            Psychiatric:       [x] Normal Affect [] Abnormal -        [x] No Hallucinations    Other pertinent observable physical exam findings:-             On this date 7/8/2022 I have spent 30 minutes reviewing previous notes, test results and face to face (virtual) with the patient discussing the diagnosis and importance of compliance with the treatment plan as well as documenting on the day of the visitMaria Triana, was evaluated through a synchronous (real-time) audio-video encounter. The patient (or guardian if applicable) is aware that this is a billable service, which includes applicable co-pays. This Virtual Visit was conducted with patient's (and/or legal guardian's) consent.  The visit was conducted pursuant to the emergency declaration under the [facility], [facility] authority and the [employer] General Act. Patient identification was verified, and a caregiver was present when appropriate. The patient was located at Home: [address] [place] [address].    Provider was located at [place] [address] ([address] [place], [place] [address]t): Narendra Cortez [address], [id number] [facility] MD

## 2022-07-20 ENCOUNTER — OFFICE VISIT (OUTPATIENT)
Dept: CARDIOLOGY CLINIC | Age: 72
End: 2022-07-20
Payer: MEDICARE

## 2022-07-20 VITALS
HEIGHT: 75 IN | SYSTOLIC BLOOD PRESSURE: 100 MMHG | HEART RATE: 58 BPM | DIASTOLIC BLOOD PRESSURE: 60 MMHG | WEIGHT: 248 LBS | BODY MASS INDEX: 30.84 KG/M2

## 2022-07-20 DIAGNOSIS — I48.0 PAROXYSMAL ATRIAL FIBRILLATION (HCC): Primary | ICD-10-CM

## 2022-07-20 PROCEDURE — 99214 OFFICE O/P EST MOD 30 MIN: CPT | Performed by: INTERNAL MEDICINE

## 2022-07-20 PROCEDURE — 1123F ACP DISCUSS/DSCN MKR DOCD: CPT | Performed by: INTERNAL MEDICINE

## 2022-07-20 PROCEDURE — 93000 ELECTROCARDIOGRAM COMPLETE: CPT | Performed by: INTERNAL MEDICINE

## 2022-07-20 ASSESSMENT — ENCOUNTER SYMPTOMS
WHEEZING: 0
COUGH: 0
DIARRHEA: 0
BACK PAIN: 0
ABDOMINAL PAIN: 0
BLOOD IN STOOL: 0
VOMITING: 0
ABDOMINAL DISTENTION: 0
SHORTNESS OF BREATH: 0

## 2022-07-20 NOTE — PROGRESS NOTES
[facility]  Cardiology Office Note  [facility] [place]  [id number]  Phone: [phone]  Fax: [phone]                            Date:  7/20/2022  Patient: Kadie Sterling  Age:  67 y.o., 1950    Referral: No ref. provider found      PROBLEM LIST:    Patient Active Problem List    Diagnosis Date Noted    Cardiomyopathy [facility])      Priority: High    Retinal artery occlusion 10/08/2021     Priority: Low    Symptomatic stenosis of right carotid artery 10/08/2021     Priority: Low    Atrial fibrillation ([facility] [address] 10/14/2020     Priority: Low    Essential hypertension 03/13/2018     Priority: Low    Prostate cancer [facility])      Priority: Low    Mixed hyperlipidemia      Priority: Low     1. Paroxysmal atrial fibrillation, DCCV 12/3/2020, on Eliquis and amiodarone. 2.  Coronary artery disease by catheterization 12/3/2020, single-vessel with calcific mid to distal LAD 60%, asymptomatic, prior EF 35% improving to 60 to 65% (ECHO 3/2021). 3.  Acute right eye vision loss with CRAO 10/2021, right ICA 65% status post right CEA 10/22/2021. PRESENTATION: Kadie Sterling is a 67y.o. year old male presents for follow-up evaluation. He had initially presented with atrial fibrillation insidiously which was picked up in the office. Subsequently underwent DCCV 12/3/2020. EF was depressed at 35%. He had cardiac catheterization revealed single-vessel disease with calcific mid to distal LAD 60% which is asymptomatic and managed medically. His EF did improve to 60 to 65%. Then in October 2021 he had a fall and 2 days later woke up with right eye vision loss and was diagnosed with central retinal artery occlusion. He was on Eliquis at that time. He was found to have a right ICA 65% stenosis and underwent right CEA 10/22/2021. He has no recovery of vision in the right eye. He is doing well otherwise. No chest pain. Remains active. No leg swelling.   No lightheadedness. REVIEW OF SYSTEMS:  Review of Systems   Constitutional:  Negative for activity change, diaphoresis and fatigue. HENT:  Negative for hearing loss, nosebleeds and tinnitus. Eyes:  Positive for visual disturbance. Respiratory:  Negative for cough, shortness of breath and wheezing. Cardiovascular:  Negative for chest pain, palpitations and leg swelling. Gastrointestinal:  Negative for abdominal distention, abdominal pain, blood in stool, diarrhea and vomiting. Endocrine: Negative for cold intolerance, heat intolerance, polydipsia, polyphagia and polyuria. Genitourinary:  Negative for difficulty urinating, flank pain and hematuria. Musculoskeletal:  Negative for arthralgias, back pain, joint swelling and myalgias. Skin:  Negative for pallor and rash. Neurological:  Negative for dizziness, seizures, syncope and headaches. Psychiatric/Behavioral:  Negative for behavioral problems and dysphoric mood. The patient is not nervous/anxious. Past Medical History:      Diagnosis Date    Atrial fibrillation [facility])     sees [facility] cardiology    CAD (coronary artery disease)     Carotid occlusion, bilateral     has affected vision recently    Erectile dysfunction     Hypertension     Mixed hyperlipidemia     Osteoarthritis     Prostate cancer [facility])        Past Surgical History:      Procedure Laterality Date    CARDIAC CATHETERIZATION  12/03/2020    EF 35%, moderate diffuse disease in LAD    CARDIOVERSION      CAROTID ENDARTERECTOMY Right 10/22/2021    RIGHT CAROTID ENDARTERECTOMY with bovine pericardial patch. Right cervical carotid arteriograms.  performed by Karyle Molly, MD at [facility] CATH LAB PROCEDURE  12/03/2020    HIP SURGERY      JOINT REPLACEMENT      KNEE SURGERY      replacement    PROSTATECTOMY         Medications:  Current Outpatient Medications   Medication Sig Dispense Refill    albuterol sulfate HFA (VENTOLIN HFA) 108 (90 Base) MCG/ACT inhaler Inhale 2 puffs into the lungs 4 times daily as needed for Wheezing 18 g 5    amiodarone (CORDARONE) 200 MG tablet Take 1 tablet by mouth daily 90 tablet 3    ELIQUIS 5 MG TABS tablet TAKE 1 TABLET TWICE A  tablet 3    carvedilol (COREG) 6.25 MG tablet TAKE 1 TABLET BY MOUTH TWICE A  tablet 5    aspirin 81 MG EC tablet Take 81 mg by mouth daily      atorvastatin (LIPITOR) 40 MG tablet Take 1 tablet by mouth daily 90 tablet 3    lisinopril (PRINIVIL;ZESTRIL) 10 MG tablet TAKE 1 TABLET DAILY 90 tablet 3    methylPREDNISolone (MEDROL DOSEPACK) 4 MG tablet Take by mouth. (Patient not taking: Reported on 2022) 1 kit 0     No current facility-administered medications for this visit. Allergies:  Patient has no known allergies.     Past Social History:  Social History     Socioeconomic History    Marital status:      Spouse name: Not on file    Number of children: Not on file    Years of education: Not on file    Highest education level: Not on file   Occupational History    Not on file   Tobacco Use    Smoking status: Former     Packs/day: 0.25     Years: 10.00     Pack years: 2.50     Types: Cigars, Cigarettes     Quit date: 1975     Years since quittin.9    Smokeless tobacco: Former     Types: Chew   Vaping Use    Vaping Use: Never used   Substance and Sexual Activity    Alcohol use: Yes     Comment: social    Drug use: No    Sexual activity: Not Currently   Other Topics Concern    Not on file   Social History Narrative    Not on file     Social Determinants of Health     Financial Resource Strain: Not on file   Food Insecurity: Not on file   Transportation Needs: Not on file   Physical Activity: Inactive    Days of Exercise per Week: 0 days    Minutes of Exercise per Session: 0 min   Stress: Not on file   Social Connections: Not on file   Intimate Partner Violence: Not on file   Housing Stability: Not on file       Family History:       Problem Relation Age of Onset    Hypertension last 72 hours. CARDIAC ENZYMES:No results for input(s): CKTOTAL, CKMB, CKMBINDEX, TROPONINI in the last 72 hours. FASTING LIPID PANEL:  Lab Results   Component Value Date/Time    HDL 53 2022 07:56 AM    LDLCALC 63 2022 07:56 AM    TRIG 111 2022 07:56 AM     LIVER PROFILE:No results for input(s): AST, ALT, LABALBU in the last 72 hours. Imaging:     Procedure     Procedure Type      Diagnostic procedure:DCA, Coronary Angiogram, Left Heart Cath, Left   Ventriculogram, Left Ventricular Pressure Measurement, Other Diagnostic   Procedure, CARDIOVERSION      Conclusions      Single-vessel disease with intermediate grade mid LAD calcific stenosis. Moderate to severe globally depressed LV systolic dysfunction. Ejection fraction estimated at 35%. Recommendations      Proceed with DC cardioversion for atrial fibrillation. Medical management of coronary artery disease. Medical management of systolic heart failure. Reevaluate LV systolic function in 3 months. If evidence of ischemia in LAD territory or symptoms would consider PCI. Signatures      ----------------------------------------------------------------   Electronically signed by August Maier MD(Performing Physician) on   2020 16:25   ----------------------------------------------------------------      Angiographic Findings      Cardiac Arteries and Lesion Findings     LMCA: Left main widely patent. LAD: LAD is a large vessel that wraps around the apex. Severe calcification  in midportion of LAD. LAD mid 35% stenosis. LAD mid to distal 60% stenosis with severe calcific plaque. First diagonal mid 60% stenosis. Moderate sized vessel. Lesion on Prox LAD: 35% stenosis 9 mm length. Lesion on Mid LAD: 60% stenosis 12 mm length. Lesion on 1st Dia% stenosis 8 mm length. LCx: Circumflex is a moderate-sized vessel with mild to moderate luminal  irregularities. Calcific disease noted.   OM1 proximal 40% stenosis. Lesion on 1st Ob Violet% stenosis 7 mm length. RCA: RCA is a large dominant vessel. RCA proximal 35% stenosis. RCA distal 30% stenosis. Lesion on Prox RCA: 35% stenosis 11 mm length. Lesion on Dist RCA: 30% stenosis 10 mm length. Ramus: Ramus is a large vessel with proximal 35% stenosis. [facility]     Ventriculography Findings  Mild left ventricular enlargement. Globally depressed moderate to severe LV systolic dysfunction. LVEDP equals 20 mmHg. LV function assessed as:Abnormal.  Ejection Fraction    - Method: LV gram. EF%: 35. TTE procedure:ECHOCARDIOGRAM COMPLETE 2D WO COLOR DOPPLER. Study Location: Echo Lab  Technical Quality: Limited visualization due to poor acoustical window. Patient Status: Outpatient     Contrast Medium: Definity. Indications:Cardiomyopathy, Atrial fibrillation, Coronary artery disease and  Hypertension. Conclusions      Summary   Limited study, no doppler, no aortic valve interrogation   Normal left ventricular chamber size and systolic function. Mild concentric left ventricular hypertrophy. Left ventricular ejection fraction is visually estimated at 60-65%. Aortic valve leaflets are mildly thickened. There is mildly decreased   excursion. Suspect mild aortic stenosis, doppler not performed.       Signature      ----------------------------------------------------------------   Electronically signed by Nathan Hernandez DO(Interpreting   physician) on 2021 02:08 PM   ----------------------------------------------------------------     EKG: Sinus rhythm at 60 bpm    ASSESSMENT and PLAN:    76-year-old gentleman with past medical history insidious onset of atrial fibrillation, status post DCCV 12/3/2020, single-vessel disease with intermediate mid to distal LAD 60% stenosis, calcific disease, medically managed, prior ejection fraction of 35% improving to 60 to 65%, right eye vision loss with central retinal artery occlusion, status post right CEA 10/2021, here for follow-up evaluation. 1.  His EF has improved back to normal post DCCV. He remains on statin therapy as well as Eliquis. Remains in sinus rhythm. He is on amiodarone. Heart rates are stable at around 60 bpm.  We will continue current medications unchanged. 2.  If there is any change in status with chest pain or exertional symptoms he will be in touch with us. Advised on diet and activity. 3.  Can follow-up with nurse practitioner in 5 months and with me in 10 months. Orders:  Orders Placed This Encounter   Procedures    EKG 12 lead     No orders of the defined types were placed in this encounter. Return for NP 5 mths; me 10 mths. Electronically signed by Stacie Wolff MD on 7/20/2022 at 10:28 [address] [facility]      Thisdictation was generated by voice recognition computer software. Although all attempts are made to edit the dictation for accuracy, there may be errors in the transcription that are not intended.

## 2022-09-15 DIAGNOSIS — E78.2 MIXED HYPERLIPIDEMIA: ICD-10-CM

## 2022-09-15 RX ORDER — ATORVASTATIN CALCIUM 40 MG/1
TABLET, FILM COATED ORAL
Qty: 90 TABLET | Refills: 3 | Status: SHIPPED | OUTPATIENT
Start: 2022-09-15

## 2022-10-17 DIAGNOSIS — I10 ESSENTIAL HYPERTENSION: ICD-10-CM

## 2022-10-17 RX ORDER — LISINOPRIL 10 MG/1
TABLET ORAL
Qty: 90 TABLET | Refills: 3 | Status: SHIPPED | OUTPATIENT
Start: 2022-10-17

## 2022-10-17 NOTE — TELEPHONE ENCOUNTER
Nikole Raymond called to request a refill on his medication.       Last office visit : 7/8/2022   Next office visit : 12/8/2022     Requested Prescriptions     Pending Prescriptions Disp Refills    lisinopril (PRINIVIL;ZESTRIL) 10 MG tablet [Pharmacy Med Name: LISINOPRIL TABS 10MG] 90 tablet 3     Sig: TAKE 1 TABLET DAILY            Swetha Ayers MA

## 2022-12-02 ENCOUNTER — TELEPHONE (OUTPATIENT)
Dept: FAMILY MEDICINE CLINIC | Age: 72
End: 2022-12-02

## 2022-12-20 ENCOUNTER — OFFICE VISIT (OUTPATIENT)
Dept: CARDIOLOGY CLINIC | Age: 72
End: 2022-12-20
Payer: MEDICARE

## 2022-12-20 VITALS
OXYGEN SATURATION: 97 % | DIASTOLIC BLOOD PRESSURE: 88 MMHG | HEIGHT: 75 IN | WEIGHT: 256 LBS | HEART RATE: 72 BPM | SYSTOLIC BLOOD PRESSURE: 138 MMHG | BODY MASS INDEX: 31.83 KG/M2

## 2022-12-20 DIAGNOSIS — Z79.899 ON AMIODARONE THERAPY: ICD-10-CM

## 2022-12-20 DIAGNOSIS — I48.0 PAROXYSMAL ATRIAL FIBRILLATION (HCC): Primary | ICD-10-CM

## 2022-12-20 DIAGNOSIS — I25.10 CORONARY ARTERY DISEASE INVOLVING NATIVE CORONARY ARTERY OF NATIVE HEART WITHOUT ANGINA PECTORIS: ICD-10-CM

## 2022-12-20 DIAGNOSIS — I10 ESSENTIAL HYPERTENSION: ICD-10-CM

## 2022-12-20 PROCEDURE — 1123F ACP DISCUSS/DSCN MKR DOCD: CPT | Performed by: CLINICAL NURSE SPECIALIST

## 2022-12-20 PROCEDURE — 3017F COLORECTAL CA SCREEN DOC REV: CPT | Performed by: CLINICAL NURSE SPECIALIST

## 2022-12-20 PROCEDURE — 99214 OFFICE O/P EST MOD 30 MIN: CPT | Performed by: CLINICAL NURSE SPECIALIST

## 2022-12-20 PROCEDURE — 1036F TOBACCO NON-USER: CPT | Performed by: CLINICAL NURSE SPECIALIST

## 2022-12-20 PROCEDURE — 3074F SYST BP LT 130 MM HG: CPT | Performed by: CLINICAL NURSE SPECIALIST

## 2022-12-20 PROCEDURE — G8417 CALC BMI ABV UP PARAM F/U: HCPCS | Performed by: CLINICAL NURSE SPECIALIST

## 2022-12-20 PROCEDURE — G8427 DOCREV CUR MEDS BY ELIG CLIN: HCPCS | Performed by: CLINICAL NURSE SPECIALIST

## 2022-12-20 PROCEDURE — G8484 FLU IMMUNIZE NO ADMIN: HCPCS | Performed by: CLINICAL NURSE SPECIALIST

## 2022-12-20 PROCEDURE — 3078F DIAST BP <80 MM HG: CPT | Performed by: CLINICAL NURSE SPECIALIST

## 2022-12-20 NOTE — PATIENT INSTRUCTIONS
Labs soon   PFTs  and chest x ray due to amiodarone     Maintain good blood pressure control-goal<130/80 at rest  Maintain good cholesterol control LDL goal<70 with arterial disease  If you are diabetic work to keep/obtain hemoglobin A1c< 7    Follow up in May with Dr. Martin Curtis  Call with any questions or concerns  Follow up with Charmayne Lavender, MD for non cardiac problems  Report any new problems  Cardiovascular Fitness-Exercise as tolerated. Strive for 30 minutes of exercise most days of the week. Cardiac / Healthy Diet- Avoid processed high fat foods, maintain low sodium/salt   Continue current medications as directed  Continue plan of treatment  It is always recommended that you bring your medications bottles with you to each visit - this is for your safety!

## 2022-12-20 NOTE — PROGRESS NOTES
[facility] Cardiology  [facility] [address]  [address]  Phone: [phone]  Fax: [phone]    OFFICE VISIT:  12/20/2022    [address]: 1950    Reason For Visit:  Angela Rojas is a 67 y.o. male who is here for Follow-up (No cardiac symptoms), Coronary Artery Disease, and Atrial Fibrillation  1. Paroxysmal atrial fibrillation, DCCV 12/3/2020, on Eliquis and amiodarone. 2.  Coronary artery disease by catheterization 12/3/2020, single-vessel with calcific mid to distal LAD 60%, asymptomatic, prior EF 35% improving to 60 to 65% (ECHO 3/2021). 3.  Acute right eye vision loss with CRAO 10/2021, right ICA 65% status post right CEA 10/22/2021. He returns to day for follow up . He is overall he is doing well. He was at his daughter's house and missed a step going into her garage and fell. Glasses scraped the top of his nose and he fell on his right side. Did not hit head. Little soreness but no serious injury. 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 The patient denies numbness or weakness to suggest cerebrovascular accident or transient ischemic attack. Hema Mishra MD is PCP and follows labs including lipids.   Niko Trinidad has the following history as recorded in [facility]:    Patient Active Problem List    Diagnosis Date Noted    Cardiomyopathy [facility])     Retinal artery occlusion 10/08/2021    Symptomatic stenosis of right carotid artery 10/08/2021    Atrial fibrillation ([facility] [address] 10/14/2020    Essential hypertension 03/13/2018    Prostate cancer ([facility] [address]     Mixed hyperlipidemia      Past Medical History:   Diagnosis Date    Atrial fibrillation [facility])     sees [facility] cardiology    CAD (coronary artery disease)     Carotid occlusion, bilateral     has affected vision recently    Erectile dysfunction     Hypertension     Mixed hyperlipidemia     Osteoarthritis     Prostate cancer [facility])      Past Surgical History:   Procedure Laterality Date    CARDIAC CATHETERIZATION  2020    EF 35%, moderate diffuse disease in LAD    CARDIOVERSION      CAROTID ENDARTERECTOMY Right 10/22/2021    RIGHT CAROTID ENDARTERECTOMY with bovine pericardial patch. Right cervical carotid arteriograms. performed by Rah Patel MD at [facility] CATH LAB PROCEDURE  2020    HIP SURGERY      JOINT REPLACEMENT      KNEE SURGERY      replacement    PROSTATECTOMY       Family History   Problem Relation Age of Onset    Hypertension Mother     Coronary Art Dis Mother     Coronary Art Dis Father     Mult Sclerosis Sister      Social History     Tobacco Use    Smoking status: Former     Packs/day: 0.25     Years: 10.00     Pack years: 2.50     Types: Cigars, Cigarettes     Quit date: 1975     Years since quittin.3    Smokeless tobacco: Former     Types: Chew   Substance Use Topics    Alcohol use: Yes     Comment: social      Current Outpatient Medications   Medication Sig Dispense Refill    lisinopril (PRINIVIL;ZESTRIL) 10 MG tablet TAKE 1 TABLET DAILY 90 tablet 3    atorvastatin (LIPITOR) 40 MG tablet TAKE 1 TABLET DAILY 90 tablet 3    amiodarone (CORDARONE) 200 MG tablet Take 1 tablet by mouth daily 90 tablet 3    ELIQUIS 5 MG TABS tablet TAKE 1 TABLET TWICE A  tablet 3    carvedilol (COREG) 6.25 MG tablet TAKE 1 TABLET BY MOUTH TWICE A  tablet 5    aspirin 81 MG EC tablet Take 81 mg by mouth daily       No current facility-administered medications for this visit. Allergies: Patient has no known allergies. Review of Systems  Constitutional - no significant activity change, appetite change, or unexpected weight change. No fever, chills or diaphoresis. No fatigue. HEENT - no significant rhinorrhea or epistaxis.  No tinnitus or significant hearing loss.   Eyes - no sudden vision change or amaurosis. Residual right eye vision loss from stroke   Respiratory - no significant wheezing, stridor, apnea or cough. No dyspnea on exertion or shortness of breath. Cardiovascular - no exertional chest pain, orthopnea or PND. No sensation of arrhythmia or slow heart rate. No claudication or leg edema. Gastrointestinal - no abdominal swelling or pain. No blood in stool. No severe constipation, diarrhea, nausea, or vomiting. Genitourinary - no difficulty urinating, dysuria, frequency, or urgency. No flank pain or hematuria. Musculoskeletal - no back pain, gait disturbance, or myalgia. Skin - no color change or rash. No pallor. No new surgical incision. Neurologic - no speech difficulty, facial asymmetry or lateralizing weakness. No seizures, presyncope, syncope, or significant dizziness. Hematologic - no easy bruising or excessive bleeding. Psychiatric - no severe anxiety or insomnia. No confusion. All other review of systems are negative. Objective  Vital Signs - /88   Pulse 72   Ht 6' 3\" (1.905 m)   Wt 256 lb (116.1 kg)   SpO2 97%   BMI 32.00 kg/m²   General - Pita Givens is alert, cooperative, and pleasant. Well groomed. No acute distress. Body habitus is overweight. HEENT - The head is normocephalic. No circumoral cyanosis. Dentition is normal.   EYES -  No Xanthelasma, no arcus senilis, no conjunctival hemorrhages or discharge. Neck - Supple, without increased jugular venous pressures. No carotid bruits. No mass. Respiratory - Lungs are clear bilaterally. No wheezes or rales. Normal effort without use of accessory muscles. Cardiovascular - Heart has regular rhythm and rate. No murmurs, rubs or gallops. + pedal pulses and no varicosities. Abdominal -  Soft, nontender, nondistended. Bowel sounds are intact. Extremities - No clubbing, cyanosis, or  edema. Musculoskeletal -  No clubbing . No Osler's nodes. Gait normal .  No kyphosis or scoliosis. Skin -  no statis ulcers or dermatitis. Dark scab on bridge of nose  Neurological - No focal signs are identified. Oriented to person, place and time. Psychiatric -  Appropriate affect and mood. Assessment:     Diagnosis Orders   1. Paroxysmal atrial fibrillation (HCC)        2. Coronary artery disease involving native coronary artery of native heart without angina pectoris        3. Essential hypertension        4. On amiodarone therapy  T4, Free    TSH    Comprehensive Metabolic Panel    XR CHEST (2 VW)    Full PFT Study With Bronchodilator        Data:  BP Readings from Last 3 Encounters:   12/20/22 138/88   07/20/22 100/60   06/08/22 130/84    Pulse Readings from Last 3 Encounters:   12/20/22 72   07/20/22 58   06/08/22 64        Wt Readings from Last 3 Encounters:   12/20/22 256 lb (116.1 kg)   07/20/22 248 lb (112.5 kg)   06/08/22 249 lb 9.6 oz (113.2 kg)     Blood pressure higher than usual but still adequate. On carvedilol and lisinopril. Heart rate controlled. Medical management clued amiodarone for arrhythmia control  Remains anticoagulated on Eliquis with no abnormal bleeding  On statin    Active and doing well. Unfortunately had a fall from missing step. We discussed fall precautions being on anticoagulation. No scrape from his glasses but no significant injury    Patient is on amiodarone therapy. Last labs were in June. We will get him set up for labs for monitoring including CMP and thyroid. No recent chest x-ray or PFTs. We will get that scheduled. Reviewed PCP recent notes   Reviewed recent labs  Lipids controlled. Normal liver enzymes         Limited study, no doppler, no aortic valve interrogation   Normal left ventricular chamber size and systolic function. Mild concentric left ventricular hypertrophy. Left ventricular ejection fraction is visually estimated at 60-65%. Aortic valve leaflets are mildly thickened.  There is mildly decreased   excursion. Suspect mild aortic stenosis, doppler not performed. Signature      ----------------------------------------------------------------   Electronically signed by Benito Corral DO(Interpreting   physician) on 03/17/2021 02:08 PM     States taking medications as prescribed  Stable cardiovascular status. No evidence of overt heart failure, angina or dysrhythmia. 30 minutes were spent preparing, reviewing and seeing patient. All questions answered    Plan    Labs soon   PFTs  and chest x ray due to amiodarone     Maintain good blood pressure control-goal<130/80 at rest  Maintain good cholesterol control LDL goal<70 with arterial disease  If you are diabetic work to keep/obtain hemoglobin A1c< 7    Follow up in May with Dr. Maria Louder  Call with any questions or concerns  Follow up with [facility], MD for non cardiac problems  Report any new problems  Cardiovascular Fitness-Exercise as tolerated. Strive for 30 minutes of exercise most days of the week. Cardiac / Healthy Diet- Avoid processed high fat foods, maintain low sodium/salt   Continue current medications as directed  Continue plan of treatment  It is always recommended that you bring your medications bottles with you to each visit - this is for your safety! CHEIKH Eldridge/transcription disclaimer: Much of this encounter note is electronic transcription/translation of spoken language to printed tach. Electronic translation of spoken language may be erroneous, or at times, nonsensical words or phrases may be inadvertently transcribed.  Although, I have reviewed the note for such errors, some may still exist.

## 2022-12-29 DIAGNOSIS — Z79.899 ON AMIODARONE THERAPY: ICD-10-CM

## 2022-12-29 LAB
ALBUMIN SERPL-MCNC: 4.2 G/DL (ref 3.5–5.2)
ALP BLD-CCNC: 86 U/L (ref 40–130)
ALT SERPL-CCNC: 16 U/L (ref 5–41)
ANION GAP SERPL CALCULATED.3IONS-SCNC: 13 MMOL/L (ref 7–19)
AST SERPL-CCNC: 17 U/L (ref 5–40)
BILIRUB SERPL-MCNC: 0.7 MG/DL (ref 0.2–1.2)
BUN BLDV-MCNC: 22 MG/DL (ref 8–23)
CALCIUM SERPL-MCNC: 9.1 MG/DL (ref 8.8–10.2)
CHLORIDE BLD-SCNC: 106 MMOL/L (ref 98–111)
CO2: 23 MMOL/L (ref 22–29)
CREAT SERPL-MCNC: 1.3 MG/DL (ref 0.5–1.2)
GFR SERPL CREATININE-BSD FRML MDRD: 58 ML/MIN/{1.73_M2}
GLUCOSE BLD-MCNC: 149 MG/DL (ref 74–109)
POTASSIUM SERPL-SCNC: 4 MMOL/L (ref 3.5–5)
SARS-COV-2, PCR: NOT DETECTED
SODIUM BLD-SCNC: 142 MMOL/L (ref 136–145)
T4 FREE: 1.68 NG/DL (ref 0.93–1.7)
TOTAL PROTEIN: 7.1 G/DL (ref 6.6–8.7)
TSH SERPL DL<=0.05 MIU/L-ACNC: 1.46 UIU/ML (ref 0.27–4.2)

## 2022-12-30 ENCOUNTER — HOSPITAL ENCOUNTER (OUTPATIENT)
Dept: PULMONOLOGY | Age: 72
Discharge: HOME | End: 2022-12-30
Payer: MEDICARE

## 2022-12-30 VITALS
BODY MASS INDEX: 31.83 KG/M2 | HEART RATE: 71 BPM | SYSTOLIC BLOOD PRESSURE: 110 MMHG | WEIGHT: 256 LBS | HEIGHT: 75 IN | DIASTOLIC BLOOD PRESSURE: 67 MMHG | OXYGEN SATURATION: 96 %

## 2022-12-30 DIAGNOSIS — Z79.899 ON AMIODARONE THERAPY: ICD-10-CM

## 2022-12-30 PROCEDURE — 94729 DIFFUSING CAPACITY: CPT

## 2022-12-30 PROCEDURE — 94060 EVALUATION OF WHEEZING: CPT

## 2022-12-30 PROCEDURE — 94726 PLETHYSMOGRAPHY LUNG VOLUMES: CPT

## 2022-12-30 PROCEDURE — 6360000002 HC RX W HCPCS: Performed by: CLINICAL NURSE SPECIALIST

## 2022-12-30 RX ORDER — ALBUTEROL SULFATE 2.5 MG/3ML
2.5 SOLUTION RESPIRATORY (INHALATION) EVERY 6 HOURS PRN
Status: DISCONTINUED | OUTPATIENT
Start: 2022-12-30 | End: 2023-01-01 | Stop reason: HOSPADM

## 2022-12-30 RX ADMIN — ALBUTEROL SULFATE 2.5 MG: 2.5 SOLUTION RESPIRATORY (INHALATION) at 09:19

## 2023-01-05 NOTE — PROCEDURES
Media Information    Pulmonary Function Study    Interpretation:    The FVC is mildly reduced. FEV1 is mildly reduced. FEV1/FVC ratio is reduced. After bronchodilator therapy there was no significant improvement in FEV1. Total lung capacity is Normal. Residual volume is Normal.      Diffusing lung capacity when corrected for alveolar volume is Normal.         Impression:    Mild chronic obstructive pulmonary disease.          Romario Vera MD, FCCP, [facility]

## 2023-01-07 PROBLEM — Z79.899 ON AMIODARONE THERAPY: Status: ACTIVE | Noted: 2023-01-07

## 2023-03-02 ENCOUNTER — OFFICE VISIT (OUTPATIENT)
Dept: VASCULAR SURGERY | Age: 73
End: 2023-03-02
Payer: MEDICARE

## 2023-03-02 ENCOUNTER — HOSPITAL ENCOUNTER (OUTPATIENT)
Dept: VASCULAR LAB | Age: 73
Discharge: HOME | End: 2023-03-02
Payer: MEDICARE

## 2023-03-02 VITALS
WEIGHT: 256 LBS | DIASTOLIC BLOOD PRESSURE: 82 MMHG | HEART RATE: 58 BPM | HEIGHT: 75 IN | OXYGEN SATURATION: 97 % | TEMPERATURE: 97.8 F | BODY MASS INDEX: 31.83 KG/M2 | SYSTOLIC BLOOD PRESSURE: 147 MMHG

## 2023-03-02 DIAGNOSIS — I65.23 BILATERAL CAROTID ARTERY STENOSIS: ICD-10-CM

## 2023-03-02 DIAGNOSIS — I65.23 BILATERAL CAROTID ARTERY STENOSIS: Primary | ICD-10-CM

## 2023-03-02 PROCEDURE — 93880 EXTRACRANIAL BILAT STUDY: CPT

## 2023-03-02 PROCEDURE — 99214 OFFICE O/P EST MOD 30 MIN: CPT | Performed by: NURSE PRACTITIONER

## 2023-03-02 PROCEDURE — G8417 CALC BMI ABV UP PARAM F/U: HCPCS | Performed by: NURSE PRACTITIONER

## 2023-03-02 PROCEDURE — 1036F TOBACCO NON-USER: CPT | Performed by: NURSE PRACTITIONER

## 2023-03-02 PROCEDURE — 3017F COLORECTAL CA SCREEN DOC REV: CPT | Performed by: NURSE PRACTITIONER

## 2023-03-02 PROCEDURE — 1123F ACP DISCUSS/DSCN MKR DOCD: CPT | Performed by: NURSE PRACTITIONER

## 2023-03-02 PROCEDURE — G8427 DOCREV CUR MEDS BY ELIG CLIN: HCPCS | Performed by: NURSE PRACTITIONER

## 2023-03-02 PROCEDURE — G8484 FLU IMMUNIZE NO ADMIN: HCPCS | Performed by: NURSE PRACTITIONER

## 2023-03-02 PROCEDURE — 3074F SYST BP LT 130 MM HG: CPT | Performed by: NURSE PRACTITIONER

## 2023-03-02 PROCEDURE — 3078F DIAST BP <80 MM HG: CPT | Performed by: NURSE PRACTITIONER

## 2023-03-03 NOTE — PROGRESS NOTES
Dasha Bermudez (:  1950) is a 67 y.o. male,Established patient, here for evaluation of the following chief complaint(s):  Follow-up (Follow up Carotid. )            SUBJECTIVE/OBJECTIVE:  He presents for follow up of carotid artery stenosis. He has a known history of carotid artery stenosis for 1 - 5 years. His current treatment includes ASA EC daily. He denies a history of CVA. He reports has not had TIA's, episodes of lateralizing weakness and episodes of amaurosis fugax. Dasha Bermudez is a 67 y.o. male with the following history as [facility] in [facility]:  Patient Active Problem List    Diagnosis Date Noted    Cardiomyopathy [facility])     On amiodarone therapy 2023    Retinal artery occlusion 10/08/2021    Symptomatic stenosis of right carotid artery 10/08/2021    Atrial fibrillation ([address] 10/14/2020    Essential hypertension 2018    Prostate cancer (HCC)     Mixed hyperlipidemia      Current Outpatient Medications   Medication Sig Dispense Refill    lisinopril (PRINIVIL;ZESTRIL) 10 MG tablet TAKE 1 TABLET DAILY 90 tablet 3    atorvastatin (LIPITOR) 40 MG tablet TAKE 1 TABLET DAILY 90 tablet 3    amiodarone (CORDARONE) 200 MG tablet Take 1 tablet by mouth daily 90 tablet 3    ELIQUIS 5 MG TABS tablet TAKE 1 TABLET TWICE A  tablet 3    carvedilol (COREG) 6.25 MG tablet TAKE 1 TABLET BY MOUTH TWICE A  tablet 5    aspirin 81 MG EC tablet Take 81 mg by mouth daily       No current facility-administered medications for this visit. Allergies: Patient has no known allergies.   Past Medical History:   Diagnosis Date    Atrial fibrillation [facility])     sees [facility] cardiology    CAD (coronary artery disease)     Carotid occlusion, bilateral     has affected vision recently    Erectile dysfunction     Hypertension     Mixed hyperlipidemia     Osteoarthritis     Prostate cancer [facility])      Past Surgical History:   Procedure Laterality Date    CARDIAC CATHETERIZATION  2020    EF 35%, moderate diffuse disease in LAD    CARDIOVERSION      CAROTID ENDARTERECTOMY Right 10/22/2021    RIGHT CAROTID ENDARTERECTOMY with bovine pericardial patch. Right cervical carotid arteriograms. performed by Sammy Javed MD at [facility] CATH LAB PROCEDURE  2020    HIP SURGERY      JOINT REPLACEMENT      KNEE SURGERY      replacement    PROSTATECTOMY       Family History   Problem Relation Age of Onset    Hypertension Mother     Coronary Art Dis Mother     Coronary Art Dis Father     Mult Sclerosis Sister      Social History     Tobacco Use    Smoking status: Former     Packs/day: 0.25     Years: 10.00     Pack years: 2.50     Types: Cigars, Cigarettes     Quit date: 1975     Years since quittin.5    Smokeless tobacco: Former     Types: Chew   Substance Use Topics    Alcohol use: Yes     Comment: social         Eyes - no sudden vision change or amaurosis. Respiratory - no significant shortness of breath,  Cardiovascular - no chest pain or syncope. No  significant leg swelling. no claudication. Musculoskeletal - no gait disturbance  Skin - no new wound. Neurologic -  No speech difficulty or lateralizing weakness. All other review of systems are negative. Physical Exam    BP (!) 147/82 (Site: Right Upper Arm, Position: Sitting, Cuff Size: Medium Adult)   Pulse 58   Temp 97.8 °F (36.6 °C)   Ht 6' 3\" (1.905 m)   Wt 256 lb (116.1 kg)   SpO2 97%   BMI 32.00 kg/m²       Neck- carotid pulses 2+ to palpation with no bruit  Cardiovascular - Regular rate and rhythm. Pulmonary - effort appears normal.  No respiratory distress. Lungs - Breath sounds normal. No wheezes or rales. Extremities - without edema   Neurologic - alert and oriented X 3. Physiologic. Face symmetric. Skin - warm, dry, and intact.   no wound  Psychiatric - mood, affect, and behavior appear normal.  Judgment and thought processes appear normal.    Risk factors for atherosclerosis of all vascular beds have been reviewed with the patient including:  Family history, tobacco abuse in all forms, elevated cholesterol, hyperlipidemia, and diabetes. Doppler results:    Right CCA/ICA <50% stenotic  Left CCA/ICA <50% stenotic  Right verterbral artery flow is antegrade  Left verterbral artery flow is antegrade  Individual velocities reviewed: Yes. Results were reviewed with the patient. Disease process is stable and chronic  by velocity criteria, I see no significant change          Reviewed previous studies including: carotid u/s  Individual images were reviewed. I agree with the findings  Results were discussed with the patient. ASSESSMENT/PLAN:  1. Bilateral carotid artery stenosis  -     VL DUP CAROTID BILATERAL; Future        Discussed management of carotid u/s which includes:  continue asa and lipitor to reduce risk of TIA/CVA, to reduce risk of arterial thrombosis, and to decrease rate of plaque buildup  Strongly encourage start/continue statin therapy -   Recommend no smoking - discussed the effect tobacco has on illness;   Proceed with 12 months with cvls         Patient instructed to call or proceed to the emergency room with any symptoms of lateralizing weakness, loss of vision in one eye, or episodes slurred speech. An electronic signature was used to authenticate this note.     --CHEIKH Lemons

## 2023-03-07 RX ORDER — CARVEDILOL 6.25 MG/1
TABLET ORAL
Qty: 180 TABLET | Refills: 5 | Status: SHIPPED | OUTPATIENT
Start: 2023-03-07

## 2023-05-01 DIAGNOSIS — I48.91 ATRIAL FIBRILLATION, UNSPECIFIED TYPE (HCC): ICD-10-CM

## 2023-05-01 RX ORDER — APIXABAN 5 MG/1
TABLET, FILM COATED ORAL
Qty: 180 TABLET | Refills: 3 | Status: SHIPPED | OUTPATIENT
Start: 2023-05-01

## 2023-05-17 ENCOUNTER — OFFICE VISIT (OUTPATIENT)
Dept: CARDIOLOGY CLINIC | Age: 73
End: 2023-05-17
Payer: MEDICARE

## 2023-05-17 VITALS
BODY MASS INDEX: 31.58 KG/M2 | SYSTOLIC BLOOD PRESSURE: 110 MMHG | HEART RATE: 51 BPM | HEIGHT: 75 IN | DIASTOLIC BLOOD PRESSURE: 76 MMHG | WEIGHT: 254 LBS

## 2023-05-17 DIAGNOSIS — I48.91 ATRIAL FIBRILLATION, UNSPECIFIED TYPE (HCC): ICD-10-CM

## 2023-05-17 DIAGNOSIS — I48.0 PAROXYSMAL ATRIAL FIBRILLATION (HCC): Primary | ICD-10-CM

## 2023-05-17 DIAGNOSIS — I25.10 CORONARY ARTERY DISEASE INVOLVING NATIVE CORONARY ARTERY OF NATIVE HEART WITHOUT ANGINA PECTORIS: ICD-10-CM

## 2023-05-17 PROCEDURE — 1036F TOBACCO NON-USER: CPT | Performed by: INTERNAL MEDICINE

## 2023-05-17 PROCEDURE — 3017F COLORECTAL CA SCREEN DOC REV: CPT | Performed by: INTERNAL MEDICINE

## 2023-05-17 PROCEDURE — 3074F SYST BP LT 130 MM HG: CPT | Performed by: INTERNAL MEDICINE

## 2023-05-17 PROCEDURE — 93000 ELECTROCARDIOGRAM COMPLETE: CPT | Performed by: INTERNAL MEDICINE

## 2023-05-17 PROCEDURE — G8417 CALC BMI ABV UP PARAM F/U: HCPCS | Performed by: INTERNAL MEDICINE

## 2023-05-17 PROCEDURE — 99214 OFFICE O/P EST MOD 30 MIN: CPT | Performed by: INTERNAL MEDICINE

## 2023-05-17 PROCEDURE — G8427 DOCREV CUR MEDS BY ELIG CLIN: HCPCS | Performed by: INTERNAL MEDICINE

## 2023-05-17 PROCEDURE — 3078F DIAST BP <80 MM HG: CPT | Performed by: INTERNAL MEDICINE

## 2023-05-17 PROCEDURE — 1123F ACP DISCUSS/DSCN MKR DOCD: CPT | Performed by: INTERNAL MEDICINE

## 2023-05-17 ASSESSMENT — ENCOUNTER SYMPTOMS
WHEEZING: 0
DIARRHEA: 0
COUGH: 0
ABDOMINAL PAIN: 0
BACK PAIN: 0
VOMITING: 0
SHORTNESS OF BREATH: 0
BLOOD IN STOOL: 0
ABDOMINAL DISTENTION: 0

## 2023-05-17 NOTE — PROGRESS NOTES
[facility]  Cardiology Office Note  [address] [address]  Phone: [phone]  Fax: [phone]                            Date:  5/17/2023  Patient: Liss Leon  Age:  75 y.o., 1950    Referral: No ref. provider found      PROBLEM LIST:    Patient Active Problem List    Diagnosis Date Noted    Cardiomyopathy [facility])      Priority: High    On amiodarone therapy 01/07/2023     Priority: Medium    Retinal artery occlusion 10/08/2021     Priority: Low    Symptomatic stenosis of right carotid artery 10/08/2021     Priority: Low    Atrial fibrillation ([address] 10/14/2020     Priority: Low    Essential hypertension 03/13/2018     Priority: Low    Prostate cancer [facility]      Priority: Low    Mixed hyperlipidemia      Priority: Low     1. Paroxysmal atrial fibrillation, DCCV 12/3/2020, on Eliquis and amiodarone. 2.  Coronary artery disease by catheterization 12/3/2020, single-vessel with calcific mid to distal LAD 60%, asymptomatic, prior EF 35% improving to 60 to 65% (ECHO 3/2021), probable mild aortic stenosis. 3.  Acute right eye vision loss with CRAO 10/2021, right ICA 65% status post right CEA 10/22/2021. PRESENTATION: Liss Leon is a 75y.o. year old male presents for follow-up evaluation. He has been doing fairly well with no significant issues. No improvement in right eye vision. Able to function better than previously. No exertional intolerance. Able to walk fairly long distances on flat ground at this pace without difficulty. No leg swelling. No chest pains reported. No palpitations reported. REVIEW OF SYSTEMS:  Review of Systems   Constitutional:  Negative for activity change, diaphoresis and fatigue. HENT:  Negative for hearing loss, nosebleeds and tinnitus. Eyes:  Positive for visual disturbance. Respiratory:  Negative for cough, shortness of breath and wheezing.     Cardiovascular:  Negative for chest pain,

## 2023-05-22 ENCOUNTER — HOSPITAL ENCOUNTER (OUTPATIENT)
Dept: NON INVASIVE DIAGNOSTICS | Age: 73
Discharge: HOME | End: 2023-05-22
Payer: MEDICARE

## 2023-05-22 DIAGNOSIS — I48.91 ATRIAL FIBRILLATION, UNSPECIFIED TYPE (HCC): ICD-10-CM

## 2023-05-22 DIAGNOSIS — I25.10 CORONARY ARTERY DISEASE INVOLVING NATIVE CORONARY ARTERY OF NATIVE HEART WITHOUT ANGINA PECTORIS: ICD-10-CM

## 2023-05-22 DIAGNOSIS — I48.0 PAROXYSMAL ATRIAL FIBRILLATION (HCC): ICD-10-CM

## 2023-05-22 LAB
LV EF: 60 %
LVEF MODALITY: NORMAL

## 2023-05-22 PROCEDURE — C8929 TTE W OR WO FOL WCON,DOPPLER: HCPCS

## 2023-05-24 ENCOUNTER — TELEPHONE (OUTPATIENT)
Dept: CARDIOLOGY CLINIC | Age: 73
End: 2023-05-24

## 2023-05-24 NOTE — TELEPHONE ENCOUNTER
----- Message from Marisela Jacinto MD sent at 5/23/2023  4:27 PM CDT -----  Please let patient know I have reviewed his echocardiogram.  Aortic valve is thickened and scarred with some restriction but no significant stenosis. Will need to be monitored going forward in the future. Heart function remains good. Follow-up as per prior appointment.

## 2023-05-24 NOTE — TELEPHONE ENCOUNTER
----- Message from Familia Mena MD sent at 5/23/2023  4:27 PM CDT -----  Please let patient know I have reviewed his echocardiogram.  Aortic valve is thickened and scarred with some restriction but no significant stenosis. Will need to be monitored going forward in the future. Heart function remains good. Follow-up as per prior appointment.

## 2023-06-01 RX ORDER — AMIODARONE HYDROCHLORIDE 200 MG/1
TABLET ORAL
Qty: 90 TABLET | Refills: 3 | Status: SHIPPED | OUTPATIENT
Start: 2023-06-01

## 2023-07-17 ENCOUNTER — OFFICE VISIT (OUTPATIENT)
Dept: PRIMARY CARE CLINIC | Age: 73
End: 2023-07-17
Payer: MEDICARE

## 2023-07-17 VITALS
WEIGHT: 252.6 LBS | HEART RATE: 88 BPM | DIASTOLIC BLOOD PRESSURE: 74 MMHG | SYSTOLIC BLOOD PRESSURE: 130 MMHG | BODY MASS INDEX: 31.41 KG/M2 | HEIGHT: 75 IN | OXYGEN SATURATION: 98 %

## 2023-07-17 DIAGNOSIS — I10 ESSENTIAL HYPERTENSION: ICD-10-CM

## 2023-07-17 DIAGNOSIS — I10 PRIMARY HYPERTENSION: ICD-10-CM

## 2023-07-17 DIAGNOSIS — Z00.00 MEDICARE ANNUAL WELLNESS VISIT, SUBSEQUENT: Primary | ICD-10-CM

## 2023-07-17 DIAGNOSIS — I48.21 PERMANENT ATRIAL FIBRILLATION (HCC): ICD-10-CM

## 2023-07-17 DIAGNOSIS — E78.2 MIXED HYPERLIPIDEMIA: ICD-10-CM

## 2023-07-17 DIAGNOSIS — N42.9 DISORDER OF PROSTATE, UNSPECIFIED: ICD-10-CM

## 2023-07-17 PROCEDURE — 3078F DIAST BP <80 MM HG: CPT | Performed by: FAMILY MEDICINE

## 2023-07-17 PROCEDURE — 1036F TOBACCO NON-USER: CPT | Performed by: FAMILY MEDICINE

## 2023-07-17 PROCEDURE — G8427 DOCREV CUR MEDS BY ELIG CLIN: HCPCS | Performed by: FAMILY MEDICINE

## 2023-07-17 PROCEDURE — 1123F ACP DISCUSS/DSCN MKR DOCD: CPT | Performed by: FAMILY MEDICINE

## 2023-07-17 PROCEDURE — 3017F COLORECTAL CA SCREEN DOC REV: CPT | Performed by: FAMILY MEDICINE

## 2023-07-17 PROCEDURE — 99213 OFFICE O/P EST LOW 20 MIN: CPT | Performed by: FAMILY MEDICINE

## 2023-07-17 PROCEDURE — 3075F SYST BP GE 130 - 139MM HG: CPT | Performed by: FAMILY MEDICINE

## 2023-07-17 PROCEDURE — G8417 CALC BMI ABV UP PARAM F/U: HCPCS | Performed by: FAMILY MEDICINE

## 2023-07-17 PROCEDURE — G0439 PPPS, SUBSEQ VISIT: HCPCS | Performed by: FAMILY MEDICINE

## 2023-07-17 RX ORDER — LISINOPRIL 10 MG/1
10 TABLET ORAL DAILY
Qty: 90 TABLET | Refills: 3 | Status: SHIPPED | OUTPATIENT
Start: 2023-07-17

## 2023-07-17 SDOH — ECONOMIC STABILITY: FOOD INSECURITY: WITHIN THE PAST 12 MONTHS, YOU WORRIED THAT YOUR FOOD WOULD RUN OUT BEFORE YOU GOT MONEY TO BUY MORE.: NEVER TRUE

## 2023-07-17 SDOH — ECONOMIC STABILITY: FOOD INSECURITY: WITHIN THE PAST 12 MONTHS, THE FOOD YOU BOUGHT JUST DIDN'T LAST AND YOU DIDN'T HAVE MONEY TO GET MORE.: NEVER TRUE

## 2023-07-17 SDOH — ECONOMIC STABILITY: INCOME INSECURITY: HOW HARD IS IT FOR YOU TO PAY FOR THE VERY BASICS LIKE FOOD, HOUSING, MEDICAL CARE, AND HEATING?: NOT HARD AT ALL

## 2023-07-17 SDOH — ECONOMIC STABILITY: HOUSING INSECURITY
IN THE LAST 12 MONTHS, WAS THERE A TIME WHEN YOU DID NOT HAVE A STEADY PLACE TO SLEEP OR SLEPT IN A SHELTER (INCLUDING NOW)?: NO

## 2023-07-17 ASSESSMENT — PATIENT HEALTH QUESTIONNAIRE - PHQ9
2. FEELING DOWN, DEPRESSED OR HOPELESS: 0
SUM OF ALL RESPONSES TO PHQ QUESTIONS 1-9: 0
SUM OF ALL RESPONSES TO PHQ9 QUESTIONS 1 & 2: 0
SUM OF ALL RESPONSES TO PHQ QUESTIONS 1-9: 0
SUM OF ALL RESPONSES TO PHQ QUESTIONS 1-9: 0
1. LITTLE INTEREST OR PLEASURE IN DOING THINGS: 0
SUM OF ALL RESPONSES TO PHQ QUESTIONS 1-9: 0

## 2023-07-17 ASSESSMENT — LIFESTYLE VARIABLES
HOW OFTEN DO YOU HAVE A DRINK CONTAINING ALCOHOL: 2-4 TIMES A MONTH
HOW MANY STANDARD DRINKS CONTAINING ALCOHOL DO YOU HAVE ON A TYPICAL DAY: 1 OR 2

## 2023-07-17 NOTE — PROGRESS NOTES
Medicare Annual Wellness Visit    Andra Alba is here for Medicare AWV    Assessment & Plan   Medicare annual wellness visit, subsequent  Primary hypertension  Permanent atrial fibrillation ([address])  Essential hypertension  -     lisinopril (PRINIVIL;ZESTRIL) 10 MG tablet; Take 1 tablet by mouth daily, Disp-90 tablet, R-3Normal  -     Basic Metabolic Panel; Future  -     CBC with Auto Differential; Future  Mixed hyperlipidemia  -     Lipid Panel; Future  Disorder of prostate, unspecified  -     PSA, Diagnostic; Future    Recommendations for Preventive Services Due: see orders and patient instructions/AVS.  Recommended screening schedule for the next 5-10 years is provided to the patient in written form: see Patient Instructions/AVS.     Return in 1 year (on 7/17/2024). Subjective         Hx of Prostate Cancer:       He was diagnosed in 2000. He had a radical prostatectomy. He has not had a PSA in more than one year. He has no urinary symptoms. A.Fib:      He has known A. Fib. He had a stroke in the past. He follows with [facility]. His TSH and Free T4 have been checked recently. He is on Amiodarone, Coreg, and Eliquis. Hypertension:        The patient longstanding hypertension. He does not check his BP at home. The patient does follow a low salt diet. The patient denies headaches, blurry vision, dizziness, heart palpitations, or chest pain. The patient currently takes Coreg 6.25 mg 1 tab orally twice daily and Lisinopril 10 mg 1 tablet orally daily . He reports he is compliant with these. Patient's complete Health Risk Assessment and screening values have been reviewed and are found in Flowsheets. The following problems were reviewed today and where indicated follow up appointments were made and/or referrals ordered.     Positive Risk Factor Screenings with Interventions:                 Weight and Activity:  Physical Activity: Insufficiently Active    Days of Exercise per Week: 1 day

## 2023-07-17 NOTE — PROGRESS NOTES
Subjective:   Lia Barron is a 75 y.o. male who presents to the office to establish with myself today. He was previously seeing Dr. Eric Pulido for his primary care. HPI:       Past Medical History:   Diagnosis Date    Atrial fibrillation [facility])     sees [facility] cardiology    CAD (coronary artery disease)     Carotid occlusion, bilateral     has affected vision recently    Erectile dysfunction     Hypertension     Mixed hyperlipidemia     Osteoarthritis     Prostate cancer [facility])      Past Surgical History:   Procedure Laterality Date    CARDIAC CATHETERIZATION  2020    EF 35%, moderate diffuse disease in LAD    CARDIOVERSION      CAROTID ENDARTERECTOMY Right 10/22/2021    RIGHT CAROTID ENDARTERECTOMY with bovine pericardial patch. Right cervical carotid arteriograms.  performed by Abel Rondon MD at [address] [place] [address] CATH LAB PROCEDURE  2020    HIP SURGERY      JOINT REPLACEMENT      KNEE SURGERY      replacement    PROSTATECTOMY       Family History   Problem Relation Age of Onset    Hypertension Mother     Coronary Art Dis Mother     Coronary Art Dis Father     Mult Sclerosis Sister      Social History     Tobacco Use    Smoking status: Former     Packs/day: 0.25     Years: 10.00     Pack years: 2.50     Types: Cigars, Cigarettes     Quit date: 1975     Years since quittin.8    Smokeless tobacco: Former     Types: Chew   Substance Use Topics    Alcohol use: Yes     Comment: social      Current Outpatient Medications   Medication Sig Dispense Refill    amiodarone (CORDARONE) 200 MG tablet TAKE 1 TABLET BY MOUTH EVERY DAY 90 tablet 3    ELIQUIS 5 MG TABS tablet TAKE 1 TABLET TWICE A  tablet 3    carvedilol (COREG) 6.25 MG tablet TAKE 1 TABLET BY MOUTH TWICE A  tablet 5    lisinopril (PRINIVIL;ZESTRIL) 10 MG tablet TAKE 1 TABLET DAILY 90 tablet 3    atorvastatin (LIPITOR) 40 MG tablet TAKE 1 TABLET DAILY 90 tablet 3    aspirin 81 MG EC tablet Take 1 tablet

## 2023-07-17 NOTE — PATIENT INSTRUCTIONS
disclaims any warranty or liability for your use of this information. Personalized Preventive Plan for Phani Valderrama - 7/17/2023  Medicare offers a range of preventive health benefits. Some of the tests and screenings are paid in full while other may be subject to a deductible, co-insurance, and/or copay. Some of these benefits include a comprehensive review of your medical history including lifestyle, illnesses that may run in your family, and various assessments and screenings as appropriate. After reviewing your medical record and screening and assessments performed today your provider may have ordered immunizations, labs, imaging, and/or referrals for you. A list of these orders (if applicable) as well as your Preventive Care list are included within your After Visit Summary for your review. Other Preventive Recommendations:    A preventive eye exam performed by an eye specialist is recommended every 1-2 years to screen for glaucoma; cataracts, macular degeneration, and other eye disorders. A preventive dental visit is recommended every 6 months. Try to get at least 150 minutes of exercise per week or 10,000 steps per day on a pedometer . Order or download the FREE \"Exercise & Physical Activity: Your Everyday Guide\" from [employer]. Call 9-617.588.3182 or search [employer] online. You need 7434-4953 mg of calcium and 8997-3556 IU of vitamin D per day. It is possible to meet your calcium requirement with diet alone, but a vitamin D supplement is usually necessary to meet this goal.  When exposed to the sun, use a sunscreen that protects against both UVA and UVB radiation with an SPF of 30 or greater. Reapply every 2 to 3 hours or after sweating, drying off with a towel, or swimming. Always wear a seat belt when traveling in a car. Always wear a helmet when riding a bicycle or motorcycle.

## 2023-07-20 DIAGNOSIS — I10 ESSENTIAL HYPERTENSION: ICD-10-CM

## 2023-07-20 DIAGNOSIS — N42.9 DISORDER OF PROSTATE, UNSPECIFIED: ICD-10-CM

## 2023-07-20 DIAGNOSIS — E78.2 MIXED HYPERLIPIDEMIA: ICD-10-CM

## 2023-07-20 LAB
ANION GAP SERPL CALCULATED.3IONS-SCNC: 15 MMOL/L (ref 7–19)
BASOPHILS # BLD: 0.1 K/UL (ref 0–0.2)
BASOPHILS NFR BLD: 1.1 % (ref 0–1)
BUN SERPL-MCNC: 23 MG/DL (ref 8–23)
CALCIUM SERPL-MCNC: 9.2 MG/DL (ref 8.8–10.2)
CHLORIDE SERPL-SCNC: 103 MMOL/L (ref 98–111)
CHOLEST SERPL-MCNC: 142 MG/DL (ref 160–199)
CO2 SERPL-SCNC: 22 MMOL/L (ref 22–29)
CREAT SERPL-MCNC: 1.2 MG/DL (ref 0.5–1.2)
EOSINOPHIL # BLD: 0.1 K/UL (ref 0–0.6)
EOSINOPHIL NFR BLD: 1.8 % (ref 0–5)
ERYTHROCYTE [DISTWIDTH] IN BLOOD BY AUTOMATED COUNT: 15 % (ref 11.5–14.5)
GLUCOSE SERPL-MCNC: 102 MG/DL (ref 74–109)
HCT VFR BLD AUTO: 46.7 % (ref 42–52)
HDLC SERPL-MCNC: 47 MG/DL (ref 55–121)
HGB BLD-MCNC: 15.3 G/DL (ref 14–18)
IMM GRANULOCYTES # BLD: 0 K/UL
LDLC SERPL CALC-MCNC: 74 MG/DL
LYMPHOCYTES # BLD: 1.5 K/UL (ref 1.1–4.5)
LYMPHOCYTES NFR BLD: 26.9 % (ref 20–40)
MCH RBC QN AUTO: 31.9 PG (ref 27–31)
MCHC RBC AUTO-ENTMCNC: 32.8 G/DL (ref 33–37)
MCV RBC AUTO: 97.3 FL (ref 80–94)
MONOCYTES # BLD: 0.6 K/UL (ref 0–0.9)
MONOCYTES NFR BLD: 9.9 % (ref 0–10)
NEUTROPHILS # BLD: 3.4 K/UL (ref 1.5–7.5)
NEUTS SEG NFR BLD: 59.9 % (ref 50–65)
PLATELET # BLD AUTO: 200 K/UL (ref 130–400)
PMV BLD AUTO: 12.2 FL (ref 9.4–12.4)
POTASSIUM SERPL-SCNC: 4.5 MMOL/L (ref 3.5–5)
PSA SERPL-MCNC: <0.01 NG/ML (ref 0–4)
RBC # BLD AUTO: 4.8 M/UL (ref 4.7–6.1)
SODIUM SERPL-SCNC: 140 MMOL/L (ref 136–145)
TRIGL SERPL-MCNC: 104 MG/DL (ref 0–149)
WBC # BLD AUTO: 5.7 K/UL (ref 4.8–10.8)

## 2023-09-18 DIAGNOSIS — E78.2 MIXED HYPERLIPIDEMIA: ICD-10-CM

## 2023-09-18 RX ORDER — ATORVASTATIN CALCIUM 40 MG/1
40 TABLET, FILM COATED ORAL DAILY
Qty: 90 TABLET | Refills: 3 | Status: SHIPPED | OUTPATIENT
Start: 2023-09-18

## 2023-09-18 NOTE — TELEPHONE ENCOUNTER
Kerin Kanner called requesting a refill of the below medication which has been pended for you:     Requested Prescriptions     Pending Prescriptions Disp Refills    atorvastatin (LIPITOR) 40 MG tablet 90 tablet 3     Sig: Take 1 tablet by mouth daily       Last Appointment Date: 7/17/2023  Next Appointment Date: Visit date not found    No Known Allergies

## 2023-09-20 ENCOUNTER — OFFICE VISIT (OUTPATIENT)
Dept: ENT CLINIC | Age: 73
End: 2023-09-20
Payer: MEDICARE

## 2023-09-20 ENCOUNTER — HOSPITAL ENCOUNTER (OUTPATIENT)
Dept: CT IMAGING | Age: 73
Discharge: HOME | End: 2023-09-20
Payer: MEDICARE

## 2023-09-20 VITALS
BODY MASS INDEX: 30.71 KG/M2 | HEIGHT: 75 IN | SYSTOLIC BLOOD PRESSURE: 136 MMHG | WEIGHT: 247 LBS | DIASTOLIC BLOOD PRESSURE: 72 MMHG

## 2023-09-20 DIAGNOSIS — H91.8X9 ASYMMETRICAL HEARING LOSS: ICD-10-CM

## 2023-09-20 DIAGNOSIS — H91.8X9 ASYMMETRICAL HEARING LOSS: Primary | ICD-10-CM

## 2023-09-20 PROBLEM — H91.8X3 ASYMMETRICAL HEARING LOSS: Status: ACTIVE | Noted: 2023-09-20

## 2023-09-20 PROCEDURE — 3078F DIAST BP <80 MM HG: CPT | Performed by: PHYSICIAN ASSISTANT

## 2023-09-20 PROCEDURE — 1123F ACP DISCUSS/DSCN MKR DOCD: CPT | Performed by: PHYSICIAN ASSISTANT

## 2023-09-20 PROCEDURE — 3017F COLORECTAL CA SCREEN DOC REV: CPT | Performed by: PHYSICIAN ASSISTANT

## 2023-09-20 PROCEDURE — 1036F TOBACCO NON-USER: CPT | Performed by: PHYSICIAN ASSISTANT

## 2023-09-20 PROCEDURE — 99203 OFFICE O/P NEW LOW 30 MIN: CPT | Performed by: PHYSICIAN ASSISTANT

## 2023-09-20 PROCEDURE — 70482 CT ORBIT/EAR/FOSSA W/O&W/DYE: CPT

## 2023-09-20 PROCEDURE — 3075F SYST BP GE 130 - 139MM HG: CPT | Performed by: PHYSICIAN ASSISTANT

## 2023-09-20 PROCEDURE — G8427 DOCREV CUR MEDS BY ELIG CLIN: HCPCS | Performed by: PHYSICIAN ASSISTANT

## 2023-09-20 PROCEDURE — G8417 CALC BMI ABV UP PARAM F/U: HCPCS | Performed by: PHYSICIAN ASSISTANT

## 2023-09-20 PROCEDURE — 6360000004 HC RX CONTRAST MEDICATION: Performed by: PHYSICIAN ASSISTANT

## 2023-09-20 RX ORDER — FLUTICASONE PROPIONATE 50 MCG
2 SPRAY, SUSPENSION (ML) NASAL 2 TIMES DAILY
Qty: 16 G | Refills: 1 | Status: SHIPPED | OUTPATIENT
Start: 2023-09-20

## 2023-09-20 RX ORDER — OXYMETAZOLINE HYDROCHLORIDE 0.05 G/100ML
SPRAY NASAL
Qty: 30 ML | Refills: 0 | Status: SHIPPED | OUTPATIENT
Start: 2023-09-20

## 2023-09-20 RX ADMIN — IOPAMIDOL 75 ML: 755 INJECTION, SOLUTION INTRAVENOUS at 16:23

## 2023-09-20 ASSESSMENT — ENCOUNTER SYMPTOMS
SORE THROAT: 0
VOICE CHANGE: 0
SINUS PRESSURE: 0
SINUS PAIN: 0
EYE PAIN: 0
PHOTOPHOBIA: 0
FACIAL SWELLING: 0
RHINORRHEA: 0
TROUBLE SWALLOWING: 0

## 2023-09-20 NOTE — ASSESSMENT & PLAN NOTE
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 I will call him the results with further recommendations to follow.

## 2023-09-20 NOTE — PROGRESS NOTES
[facility] OTOLARYNGOLOGY/ENT  Mr. Deepak Batista is a pleasant 27-year-old  male that was referred by Dr. Geovanna Lee due to problems with asymmetrical hearing loss. Patient reports that he has had diminished hearing for several years and feels like it is worsening. He was told by his insurance company to have a hearing screening. Audiology screening demonstrated normal to moderately severe sensorineural sloping hearing loss to the right ear. Left ear demonstrated mild to severe mixed sloping hearing loss. Tympanograms were noted be type A bilaterally. Due to the asymmetrical hearing loss to the left ear that was mixed, a medical evaluation has been requested. Patient denies any prior ear surgeries or sinus surgeries in the past.    Addendum: At the completion of the dictation, the CT had already been processed and read and demonstrated left-sided fluid in the mastoid cavity with a diagnosis of Cayey-mastoiditis. Advised the patient that we will place him on Flonase and Afrin and have him follow-up in 2 weeks for reevaluation. Overall I feel this is probably an incidental finding and is not contributing to his hearing loss that was previously noted. Allergies: Patient has no known allergies. Current Outpatient Medications   Medication Sig Dispense Refill    fluticasone (FLONASE) 50 MCG/ACT nasal spray 2 sprays by Each Nostril route in the morning and 2 sprays in the evening.  16 g 1    oxymetazoline (AFRIN) 0.05 % nasal spray 2 squirts to each nostril twice a day x 7 days 30 mL 0    atorvastatin (LIPITOR) 40 MG tablet Take 1 tablet by mouth daily 90 tablet 3    lisinopril (PRINIVIL;ZESTRIL) 10 MG tablet Take 1 tablet by mouth daily 90 tablet 3    amiodarone (CORDARONE) 200 MG tablet TAKE 1 TABLET BY MOUTH EVERY DAY 90 tablet 3    ELIQUIS 5 MG TABS tablet TAKE 1 TABLET TWICE A  tablet 3    carvedilol (COREG) 6.25 MG tablet TAKE 1 TABLET BY MOUTH TWICE A  tablet 5    aspirin 81

## 2023-10-11 ENCOUNTER — OFFICE VISIT (OUTPATIENT)
Dept: ENT CLINIC | Age: 73
End: 2023-10-11
Payer: MEDICARE

## 2023-10-11 VITALS
HEIGHT: 75 IN | SYSTOLIC BLOOD PRESSURE: 130 MMHG | WEIGHT: 251 LBS | BODY MASS INDEX: 31.21 KG/M2 | DIASTOLIC BLOOD PRESSURE: 72 MMHG

## 2023-10-11 DIAGNOSIS — H65.92 MIDDLE EAR EFFUSION, LEFT: Primary | ICD-10-CM

## 2023-10-11 PROCEDURE — 1123F ACP DISCUSS/DSCN MKR DOCD: CPT | Performed by: PHYSICIAN ASSISTANT

## 2023-10-11 PROCEDURE — G8484 FLU IMMUNIZE NO ADMIN: HCPCS | Performed by: PHYSICIAN ASSISTANT

## 2023-10-11 PROCEDURE — G8417 CALC BMI ABV UP PARAM F/U: HCPCS | Performed by: PHYSICIAN ASSISTANT

## 2023-10-11 PROCEDURE — 3075F SYST BP GE 130 - 139MM HG: CPT | Performed by: PHYSICIAN ASSISTANT

## 2023-10-11 PROCEDURE — 1036F TOBACCO NON-USER: CPT | Performed by: PHYSICIAN ASSISTANT

## 2023-10-11 PROCEDURE — 3017F COLORECTAL CA SCREEN DOC REV: CPT | Performed by: PHYSICIAN ASSISTANT

## 2023-10-11 PROCEDURE — 3078F DIAST BP <80 MM HG: CPT | Performed by: PHYSICIAN ASSISTANT

## 2023-10-11 PROCEDURE — G8427 DOCREV CUR MEDS BY ELIG CLIN: HCPCS | Performed by: PHYSICIAN ASSISTANT

## 2023-10-11 PROCEDURE — 99213 OFFICE O/P EST LOW 20 MIN: CPT | Performed by: PHYSICIAN ASSISTANT

## 2023-10-11 RX ORDER — ECHINACEA PURPUREA EXTRACT 125 MG
2 TABLET ORAL 2 TIMES DAILY
Qty: 1 EACH | Refills: 3 | Status: SHIPPED | OUTPATIENT
Start: 2023-10-11

## 2023-10-11 RX ORDER — PREDNISONE 20 MG/1
TABLET ORAL
Qty: 20 TABLET | Refills: 0 | Status: SHIPPED | OUTPATIENT
Start: 2023-10-11

## 2023-10-11 RX ORDER — CLINDAMYCIN HYDROCHLORIDE 300 MG/1
300 CAPSULE ORAL 3 TIMES DAILY
Qty: 30 CAPSULE | Refills: 0 | Status: SHIPPED | OUTPATIENT
Start: 2023-10-11 | End: 2023-10-21

## 2023-10-11 ASSESSMENT — ENCOUNTER SYMPTOMS
VOICE CHANGE: 0
FACIAL SWELLING: 0
TROUBLE SWALLOWING: 0
RHINORRHEA: 0
SORE THROAT: 0
PHOTOPHOBIA: 0
SINUS PAIN: 0
EYE PAIN: 0
SINUS PRESSURE: 0

## 2023-10-11 NOTE — PROGRESS NOTES
[facility] OTOLARYNGOLOGY/ENT  Mr. Brittany Villalobos is a pleasant 26-year-old  male that presents for 2-week follow-up after treatment for left middle ear effusion. Patient was noted initially with asymmetrical hearing loss and underwent a CT of the IAC. This demonstrated evidence of left-sided otomastoiditis. After treatment, the patient reports that he is still having some muffled hearing and does not feel like he has a lot of change. He denies any drainage from the ear canal or any otalgia. Tympanogram was reperformed today and demonstrated the left side to still be flat and consistent with a middle ear effusion. Right side demonstrated a type A. Allergies: Patient has no known allergies. Current Outpatient Medications   Medication Sig Dispense Refill    predniSONE (DELTASONE) 20 MG tablet Take 1 tab po bid x 6 days, decrease to 1 tab po q d x 4 days, then decrease to half tablet po q d x 2 days. 20 tablet 0    clindamycin (CLEOCIN) 300 MG capsule Take 1 capsule by mouth 3 times daily for 10 days 30 capsule 0    sodium chloride (OCEAN NASAL SPRAY) 0.65 % nasal spray 2 sprays by Nasal route in the morning and 2 sprays in the evening. 1 each 3    fluticasone (FLONASE) 50 MCG/ACT nasal spray 2 sprays by Each Nostril route in the morning and 2 sprays in the evening. 16 g 1    oxymetazoline (AFRIN) 0.05 % nasal spray 2 squirts to each nostril twice a day x 7 days 30 mL 0    atorvastatin (LIPITOR) 40 MG tablet Take 1 tablet by mouth daily 90 tablet 3    lisinopril (PRINIVIL;ZESTRIL) 10 MG tablet Take 1 tablet by mouth daily 90 tablet 3    amiodarone (CORDARONE) 200 MG tablet TAKE 1 TABLET BY MOUTH EVERY DAY 90 tablet 3    ELIQUIS 5 MG TABS tablet TAKE 1 TABLET TWICE A  tablet 3    carvedilol (COREG) 6.25 MG tablet TAKE 1 TABLET BY MOUTH TWICE A  tablet 5    aspirin 81 MG EC tablet Take 1 tablet by mouth daily       No current facility-administered medications for this visit.        Past

## 2023-10-11 NOTE — ASSESSMENT & PLAN NOTE
Persistent left middle ear effusion  Plan: I will place the patient on prednisone, clindamycin, and Ocean nasal spray for the current findings. He is to follow-up in 2 weeks for reevaluation.

## 2023-11-03 ENCOUNTER — OFFICE VISIT (OUTPATIENT)
Dept: ENT CLINIC | Age: 73
End: 2023-11-03
Payer: MEDICARE

## 2023-11-03 VITALS
HEIGHT: 75 IN | DIASTOLIC BLOOD PRESSURE: 70 MMHG | WEIGHT: 255 LBS | SYSTOLIC BLOOD PRESSURE: 130 MMHG | BODY MASS INDEX: 31.71 KG/M2

## 2023-11-03 DIAGNOSIS — H65.92 MIDDLE EAR EFFUSION, LEFT: Primary | ICD-10-CM

## 2023-11-03 PROCEDURE — 3017F COLORECTAL CA SCREEN DOC REV: CPT | Performed by: PHYSICIAN ASSISTANT

## 2023-11-03 PROCEDURE — G8417 CALC BMI ABV UP PARAM F/U: HCPCS | Performed by: PHYSICIAN ASSISTANT

## 2023-11-03 PROCEDURE — 3075F SYST BP GE 130 - 139MM HG: CPT | Performed by: PHYSICIAN ASSISTANT

## 2023-11-03 PROCEDURE — 99213 OFFICE O/P EST LOW 20 MIN: CPT | Performed by: PHYSICIAN ASSISTANT

## 2023-11-03 PROCEDURE — 1036F TOBACCO NON-USER: CPT | Performed by: PHYSICIAN ASSISTANT

## 2023-11-03 PROCEDURE — 3078F DIAST BP <80 MM HG: CPT | Performed by: PHYSICIAN ASSISTANT

## 2023-11-03 PROCEDURE — G8427 DOCREV CUR MEDS BY ELIG CLIN: HCPCS | Performed by: PHYSICIAN ASSISTANT

## 2023-11-03 PROCEDURE — G8484 FLU IMMUNIZE NO ADMIN: HCPCS | Performed by: PHYSICIAN ASSISTANT

## 2023-11-03 PROCEDURE — 1123F ACP DISCUSS/DSCN MKR DOCD: CPT | Performed by: PHYSICIAN ASSISTANT

## 2023-11-03 NOTE — PROGRESS NOTES
Mr. Danae Josue is a pleasant 67-year-old  male that presents for a 2-week follow-up due to initial problems with otitis media of the left ear that followed by a left middle ear effusion. Overall he continues to complain of muffled hearing and a constant pressure to the left ear. He denies any drainage or issues with fever or chills. Physical examination with the microscope revealed a bulging left TM with davis-colored fluid noted with air-fluid levels present. There was no evidence of infection. Examination of the right ear demonstrated a normal TM and canal.  Neck exam demonstrated no lymphadenopathy or thyromegaly. No tenderness noted to the station tube region. Oral exam was unrevealing. Impression: Persistent left middle ear effusion with failed medical management    Plan: I recommended the patient see Dr. Leslie Bowser for possible myringotomy of the left ear. I will have him to see Lexi for tympanogram and Dr. Leslie Bowser for overall assessment. Patient was reminded to call if he has any questions or problems.       Electronically signed by Oneil Mcgrath PA-C on 11/3/23 at 10:31 AM JOSET

## 2023-11-16 ENCOUNTER — HOSPITAL ENCOUNTER (OUTPATIENT)
Dept: GENERAL RADIOLOGY | Age: 73
Discharge: HOME | End: 2023-11-16
Payer: MEDICARE

## 2023-11-16 ENCOUNTER — OFFICE VISIT (OUTPATIENT)
Dept: CARDIOLOGY CLINIC | Age: 73
End: 2023-11-16
Payer: MEDICARE

## 2023-11-16 VITALS
HEART RATE: 54 BPM | BODY MASS INDEX: 31.33 KG/M2 | HEIGHT: 75 IN | SYSTOLIC BLOOD PRESSURE: 124 MMHG | WEIGHT: 252 LBS | OXYGEN SATURATION: 98 % | DIASTOLIC BLOOD PRESSURE: 78 MMHG

## 2023-11-16 DIAGNOSIS — I35.8 AORTIC VALVE SCLEROSIS: ICD-10-CM

## 2023-11-16 DIAGNOSIS — I25.10 CORONARY ARTERY DISEASE INVOLVING NATIVE CORONARY ARTERY OF NATIVE HEART WITHOUT ANGINA PECTORIS: ICD-10-CM

## 2023-11-16 DIAGNOSIS — Z79.899 ON AMIODARONE THERAPY: ICD-10-CM

## 2023-11-16 DIAGNOSIS — I10 ESSENTIAL HYPERTENSION: ICD-10-CM

## 2023-11-16 DIAGNOSIS — I48.0 PAROXYSMAL ATRIAL FIBRILLATION (HCC): Primary | ICD-10-CM

## 2023-11-16 LAB
ALBUMIN SERPL-MCNC: 4.2 G/DL (ref 3.5–5.2)
ALP SERPL-CCNC: 66 U/L (ref 40–130)
ALT SERPL-CCNC: 22 U/L (ref 5–41)
ANION GAP SERPL CALCULATED.3IONS-SCNC: 11 MMOL/L (ref 7–19)
AST SERPL-CCNC: 20 U/L (ref 5–40)
BILIRUB SERPL-MCNC: 0.7 MG/DL (ref 0.2–1.2)
BUN SERPL-MCNC: 22 MG/DL (ref 8–23)
CALCIUM SERPL-MCNC: 9.5 MG/DL (ref 8.8–10.2)
CHLORIDE SERPL-SCNC: 106 MMOL/L (ref 98–111)
CO2 SERPL-SCNC: 27 MMOL/L (ref 22–29)
CREAT SERPL-MCNC: 1.2 MG/DL (ref 0.5–1.2)
GLUCOSE SERPL-MCNC: 103 MG/DL (ref 74–109)
POTASSIUM SERPL-SCNC: 4.4 MMOL/L (ref 3.5–5)
PROT SERPL-MCNC: 7.1 G/DL (ref 6.6–8.7)
SODIUM SERPL-SCNC: 144 MMOL/L (ref 136–145)
T4 FREE SERPL-MCNC: 1.7 NG/DL (ref 0.93–1.7)
TSH SERPL DL<=0.005 MIU/L-ACNC: 1.24 UIU/ML (ref 0.27–4.2)

## 2023-11-16 PROCEDURE — G8484 FLU IMMUNIZE NO ADMIN: HCPCS | Performed by: CLINICAL NURSE SPECIALIST

## 2023-11-16 PROCEDURE — 71046 X-RAY EXAM CHEST 2 VIEWS: CPT

## 2023-11-16 PROCEDURE — 1123F ACP DISCUSS/DSCN MKR DOCD: CPT | Performed by: CLINICAL NURSE SPECIALIST

## 2023-11-16 PROCEDURE — 3017F COLORECTAL CA SCREEN DOC REV: CPT | Performed by: CLINICAL NURSE SPECIALIST

## 2023-11-16 PROCEDURE — 3078F DIAST BP <80 MM HG: CPT | Performed by: CLINICAL NURSE SPECIALIST

## 2023-11-16 PROCEDURE — G8427 DOCREV CUR MEDS BY ELIG CLIN: HCPCS | Performed by: CLINICAL NURSE SPECIALIST

## 2023-11-16 PROCEDURE — 3074F SYST BP LT 130 MM HG: CPT | Performed by: CLINICAL NURSE SPECIALIST

## 2023-11-16 PROCEDURE — 99214 OFFICE O/P EST MOD 30 MIN: CPT | Performed by: CLINICAL NURSE SPECIALIST

## 2023-11-16 PROCEDURE — 1036F TOBACCO NON-USER: CPT | Performed by: CLINICAL NURSE SPECIALIST

## 2023-11-16 PROCEDURE — G8417 CALC BMI ABV UP PARAM F/U: HCPCS | Performed by: CLINICAL NURSE SPECIALIST

## 2023-11-16 NOTE — PATIENT INSTRUCTIONS
Ok to hold eliquis for teeth extraction    Labs and chest x-ray soon for amiodarone work-up  Maintain good blood pressure control-goal<130/80 at rest  Maintain good cholesterol control LDL goal<70 with arterial disease  If you are diabetic work to keep/obtain hemoglobin A1c< 7    Follow up in June with Dr. Satinder Orta  Call with any questions or concerns  Follow up with Carolina Morse MD for non cardiac problems  Report any new problems  Cardiovascular Fitness-Exercise as tolerated. Strive for 30 minutes of exercise most days of the week. Cardiac / Healthy Diet- Avoid processed high fat foods, maintain low sodium/salt   Continue current medications as directed  Continue plan of treatment  It is always recommended that you bring your medications bottles with you to each visit - this is for your safety!

## 2023-11-16 NOTE — PROGRESS NOTES
Strive for 30 minutes of exercise most days of the week. Cardiac / Healthy Diet- Avoid processed high fat foods, maintain low sodium/salt   Continue current medications as directed  Continue plan of treatment  It is always recommended that you bring your medications bottles with you to each visit - this is for your safety! CHEIKH Gaspar dragon/transcription disclaimer: Much of this encounter note is electronic transcription/translation of spoken language to printed tach. Electronic translation of spoken language may be erroneous, or at times, nonsensical words or phrases may be inadvertently transcribed.  Although, I have reviewed the note for such errors, some may still exist.

## 2023-11-17 ENCOUNTER — TELEPHONE (OUTPATIENT)
Dept: CARDIOLOGY CLINIC | Age: 73
End: 2023-11-17

## 2023-11-17 NOTE — TELEPHONE ENCOUNTER
----- Message from CHEIKH Merrill sent at 11/17/2023  8:04 AM CST -----  Normal chest x-ray. Obtain due to amiodarone.

## 2024-01-22 ENCOUNTER — PROCEDURE VISIT (OUTPATIENT)
Dept: ENT CLINIC | Age: 74
End: 2024-01-22
Payer: MEDICARE

## 2024-01-22 ENCOUNTER — OFFICE VISIT (OUTPATIENT)
Dept: ENT CLINIC | Age: 74
End: 2024-01-22
Payer: MEDICARE

## 2024-01-22 VITALS
DIASTOLIC BLOOD PRESSURE: 86 MMHG | SYSTOLIC BLOOD PRESSURE: 158 MMHG | BODY MASS INDEX: 32.45 KG/M2 | HEIGHT: 75 IN | WEIGHT: 261 LBS

## 2024-01-22 DIAGNOSIS — H65.92 MIDDLE EAR EFFUSION, LEFT: ICD-10-CM

## 2024-01-22 DIAGNOSIS — H69.92 DYSFUNCTION OF LEFT EUSTACHIAN TUBE: Primary | ICD-10-CM

## 2024-01-22 DIAGNOSIS — H90.3 SENSORINEURAL HEARING LOSS (SNHL) OF BOTH EARS: ICD-10-CM

## 2024-01-22 PROCEDURE — G8427 DOCREV CUR MEDS BY ELIG CLIN: HCPCS | Performed by: OTOLARYNGOLOGY

## 2024-01-22 PROCEDURE — G8484 FLU IMMUNIZE NO ADMIN: HCPCS | Performed by: OTOLARYNGOLOGY

## 2024-01-22 PROCEDURE — 99214 OFFICE O/P EST MOD 30 MIN: CPT | Performed by: OTOLARYNGOLOGY

## 2024-01-22 PROCEDURE — 1036F TOBACCO NON-USER: CPT | Performed by: OTOLARYNGOLOGY

## 2024-01-22 PROCEDURE — 3077F SYST BP >= 140 MM HG: CPT | Performed by: OTOLARYNGOLOGY

## 2024-01-22 PROCEDURE — 3079F DIAST BP 80-89 MM HG: CPT | Performed by: OTOLARYNGOLOGY

## 2024-01-22 PROCEDURE — 1123F ACP DISCUSS/DSCN MKR DOCD: CPT | Performed by: OTOLARYNGOLOGY

## 2024-01-22 PROCEDURE — 69420 INCISION OF EARDRUM: CPT | Performed by: OTOLARYNGOLOGY

## 2024-01-22 PROCEDURE — G8417 CALC BMI ABV UP PARAM F/U: HCPCS | Performed by: OTOLARYNGOLOGY

## 2024-01-22 PROCEDURE — 92567 TYMPANOMETRY: CPT | Performed by: AUDIOLOGIST

## 2024-01-22 PROCEDURE — 3017F COLORECTAL CA SCREEN DOC REV: CPT | Performed by: OTOLARYNGOLOGY

## 2024-01-22 RX ORDER — OFLOXACIN 3 MG/ML
5 SOLUTION AURICULAR (OTIC) 2 TIMES DAILY
Qty: 5 ML | Refills: 0 | Status: SHIPPED | OUTPATIENT
Start: 2024-01-22 | End: 2024-01-27

## 2024-01-22 ASSESSMENT — ENCOUNTER SYMPTOMS
GASTROINTESTINAL NEGATIVE: 1
RESPIRATORY NEGATIVE: 1
ALLERGIC/IMMUNOLOGIC NEGATIVE: 1
EYES NEGATIVE: 1

## 2024-01-22 NOTE — PROGRESS NOTES
History   Meet Jiménez is a 73 y.o. male who presented to the clinic this date with complaints of left middle ear effusion. He reported symptoms have been ongoing for several months.     Summary   Tympanometry consistent with normal TM mobility right and middle ear effusion left.    Results   Otoscopy:   Right: Clear EAC/Normal TM  Left: Dull TM    Tympanometry:    Right: Type A  Left: Type B      Plan   Results of today's testing were discussed with Mr. Jiménez and the following recommendations were made:    Follow up with ENT as scheduled.  Recheck hearing following medical management.         Audiogram and Acoustic Immittance

## 2024-01-22 NOTE — PROGRESS NOTES
2024    Meet Jiménez (:  1950) is a 73 y.o. male, Established patient, here for evaluation of the following chief complaint(s):  New Patient (Left ear)      Vitals:    24 1031   BP: (!) 158/86   Weight: 118.4 kg (261 lb)   Height: 1.905 m (6' 3\")       Wt Readings from Last 3 Encounters:   24 118.4 kg (261 lb)   23 114.3 kg (252 lb)   23 115.7 kg (255 lb)       BP Readings from Last 3 Encounters:   24 (!) 158/86   23 124/78   23 130/70         SUBJECTIVE/OBJECTIVE:    Patient seen today for his left ear.  He suffers from hearing loss not hearing out of town and sent to see us because of the middle ear effusion on the left.  He has been on multiple medications which have not helped.  He is here and feels the left ear is worse than the right ear.  Hearing test today demonstrates type B tympanogram on the left.        Review of Systems   Constitutional: Negative.    HENT:  Positive for hearing loss.    Eyes: Negative.    Respiratory: Negative.     Cardiovascular: Negative.    Gastrointestinal: Negative.    Endocrine: Negative.    Musculoskeletal: Negative.    Skin: Negative.    Allergic/Immunologic: Negative.    Neurological: Negative.    Hematological: Negative.    Psychiatric/Behavioral: Negative.          Physical Exam  Vitals reviewed.   Constitutional:       Appearance: Normal appearance. He is normal weight.   HENT:      Head: Normocephalic and atraumatic.      Right Ear: Tympanic membrane, ear canal and external ear normal.      Left Ear: Ear canal and external ear normal. A middle ear effusion is present.      Nose: Nose normal.      Mouth/Throat:      Mouth: Mucous membranes are moist.      Pharynx: Oropharynx is clear.   Eyes:      Extraocular Movements: Extraocular movements intact.      Pupils: Pupils are equal, round, and reactive to light.   Cardiovascular:      Rate and Rhythm: Normal rate and regular rhythm.   Pulmonary:      Effort:

## 2024-03-06 ENCOUNTER — OFFICE VISIT (OUTPATIENT)
Dept: ENT CLINIC | Age: 74
End: 2024-03-06

## 2024-03-06 VITALS
SYSTOLIC BLOOD PRESSURE: 138 MMHG | BODY MASS INDEX: 32.08 KG/M2 | DIASTOLIC BLOOD PRESSURE: 88 MMHG | WEIGHT: 258 LBS | HEIGHT: 75 IN

## 2024-03-06 DIAGNOSIS — H90.3 SENSORINEURAL HEARING LOSS (SNHL) OF BOTH EARS: ICD-10-CM

## 2024-03-06 DIAGNOSIS — H92.02 EAR PAIN, LEFT: Primary | ICD-10-CM

## 2024-03-06 ASSESSMENT — ENCOUNTER SYMPTOMS
RESPIRATORY NEGATIVE: 1
ALLERGIC/IMMUNOLOGIC NEGATIVE: 1
EYES NEGATIVE: 1
GASTROINTESTINAL NEGATIVE: 1

## 2024-03-06 NOTE — PROGRESS NOTES
3/6/2024    Meet Jiménez (:  1950) is a 73 y.o. male, Established patient, here for evaluation of the following chief complaint(s):  Follow-up (Left ear)      Vitals:    24 1400   BP: 138/88   Weight: 117 kg (258 lb)   Height: 1.905 m (6' 3\")       Wt Readings from Last 3 Encounters:   24 117 kg (258 lb)   24 118.4 kg (261 lb)   23 114.3 kg (252 lb)       BP Readings from Last 3 Encounters:   24 138/88   24 (!) 158/86   23 124/78         SUBJECTIVE/OBJECTIVE:    Patient seen today for his ears.  At the last visit I performed a myringotomy on the left and he says he is doing well until Monday.  Monday morning he yawned and having sharp pain in his ear but then it resolved.  This happened last 2 mornings as well.  He is not sure if there is pressure again.        Review of Systems   Constitutional: Negative.    HENT:  Positive for ear pain and hearing loss.    Eyes: Negative.    Respiratory: Negative.     Cardiovascular: Negative.    Gastrointestinal: Negative.    Endocrine: Negative.    Musculoskeletal: Negative.    Skin: Negative.    Allergic/Immunologic: Negative.    Neurological: Negative.    Hematological: Negative.    Psychiatric/Behavioral: Negative.          Physical Exam  Vitals reviewed.   Constitutional:       Appearance: Normal appearance. He is normal weight.   HENT:      Head: Normocephalic and atraumatic.      Right Ear: Tympanic membrane, ear canal and external ear normal.      Left Ear: Tympanic membrane, ear canal and external ear normal.      Nose: Nose normal.      Mouth/Throat:      Mouth: Mucous membranes are moist.      Pharynx: Oropharynx is clear.   Eyes:      Extraocular Movements: Extraocular movements intact.      Pupils: Pupils are equal, round, and reactive to light.   Cardiovascular:      Rate and Rhythm: Normal rate and regular rhythm.   Pulmonary:      Effort: Pulmonary effort is normal.      Breath sounds: Normal breath

## 2024-03-08 ENCOUNTER — HOSPITAL ENCOUNTER (OUTPATIENT)
Dept: VASCULAR LAB | Age: 74
Discharge: HOME | End: 2024-03-08
Payer: MEDICARE

## 2024-03-08 DIAGNOSIS — I65.23 BILATERAL CAROTID ARTERY STENOSIS: ICD-10-CM

## 2024-03-08 PROCEDURE — 93880 EXTRACRANIAL BILAT STUDY: CPT

## 2024-03-11 DIAGNOSIS — I65.23 BILATERAL CAROTID ARTERY STENOSIS: Primary | ICD-10-CM

## 2024-03-12 ENCOUNTER — OFFICE VISIT (OUTPATIENT)
Dept: VASCULAR SURGERY | Age: 74
End: 2024-03-12
Payer: MEDICARE

## 2024-03-12 VITALS
TEMPERATURE: 97.1 F | HEART RATE: 63 BPM | DIASTOLIC BLOOD PRESSURE: 90 MMHG | OXYGEN SATURATION: 98 % | SYSTOLIC BLOOD PRESSURE: 182 MMHG

## 2024-03-12 DIAGNOSIS — I65.23 BILATERAL CAROTID ARTERY STENOSIS: Primary | ICD-10-CM

## 2024-03-12 PROCEDURE — G8484 FLU IMMUNIZE NO ADMIN: HCPCS | Performed by: NURSE PRACTITIONER

## 2024-03-12 PROCEDURE — 3077F SYST BP >= 140 MM HG: CPT | Performed by: NURSE PRACTITIONER

## 2024-03-12 PROCEDURE — 3017F COLORECTAL CA SCREEN DOC REV: CPT | Performed by: NURSE PRACTITIONER

## 2024-03-12 PROCEDURE — 3080F DIAST BP >= 90 MM HG: CPT | Performed by: NURSE PRACTITIONER

## 2024-03-12 PROCEDURE — G8427 DOCREV CUR MEDS BY ELIG CLIN: HCPCS | Performed by: NURSE PRACTITIONER

## 2024-03-12 PROCEDURE — 99214 OFFICE O/P EST MOD 30 MIN: CPT | Performed by: NURSE PRACTITIONER

## 2024-03-12 PROCEDURE — G8417 CALC BMI ABV UP PARAM F/U: HCPCS | Performed by: NURSE PRACTITIONER

## 2024-03-12 PROCEDURE — 1123F ACP DISCUSS/DSCN MKR DOCD: CPT | Performed by: NURSE PRACTITIONER

## 2024-03-12 PROCEDURE — 1036F TOBACCO NON-USER: CPT | Performed by: NURSE PRACTITIONER

## 2024-03-12 NOTE — PROGRESS NOTES
Meet Jiménez (:  1950) is a 73 y.o. male,Established patient, here for evaluation of the following chief complaint(s):  Follow-up (Follow up carotid. )            SUBJECTIVE/OBJECTIVE:  He presents for follow up of carotid artery stenosis.  He has a known history of carotid artery stenosis for 1 - 5 years. His current treatment includes asa, eliquis, and lipitor.He denies a history of CVA.  He reports has not had TIA's, episodes of lateralizing weakness and episodes of amaurosis fugax.    I have personally reviewed the following: problem list, current meds, allergies, PMH, PSH, family hx, and social hx  Meet Jiménez is a 73 y.o. male with the following history as recorded in [facility]:  Patient Active Problem List    Diagnosis Date Noted    Cardiomyopathy (HCC)     On amiodarone therapy 2023    Middle ear effusion, left 10/11/2023    Asymmetrical hearing loss 2023    Retinal artery occlusion 10/08/2021    Symptomatic stenosis of right carotid artery 10/08/2021    Atrial fibrillation (HCC) 10/14/2020    Essential hypertension 2018    Prostate cancer (HCC)     Mixed hyperlipidemia      Current Outpatient Medications   Medication Sig Dispense Refill    sodium chloride (OCEAN NASAL SPRAY) 0.65 % nasal spray 2 sprays by Nasal route in the morning and 2 sprays in the evening. 1 each 3    fluticasone (FLONASE) 50 MCG/ACT nasal spray 2 sprays by Each Nostril route in the morning and 2 sprays in the evening. 16 g 1    oxymetazoline (AFRIN) 0.05 % nasal spray 2 squirts to each nostril twice a day x 7 days 30 mL 0    atorvastatin (LIPITOR) 40 MG tablet Take 1 tablet by mouth daily 90 tablet 3    lisinopril (PRINIVIL;ZESTRIL) 10 MG tablet Take 1 tablet by mouth daily 90 tablet 3    amiodarone (CORDARONE) 200 MG tablet TAKE 1 TABLET BY MOUTH EVERY DAY 90 tablet 3    ELIQUIS 5 MG TABS tablet TAKE 1 TABLET TWICE A  tablet 3    carvedilol (COREG) 6.25 MG tablet TAKE 1 TABLET BY MOUTH

## 2024-03-13 ENCOUNTER — TELEPHONE (OUTPATIENT)
Dept: CARDIOLOGY CLINIC | Age: 74
End: 2024-03-13

## 2024-03-14 ENCOUNTER — OFFICE VISIT (OUTPATIENT)
Dept: CARDIOLOGY CLINIC | Age: 74
End: 2024-03-14
Payer: MEDICARE

## 2024-03-14 VITALS
DIASTOLIC BLOOD PRESSURE: 88 MMHG | HEIGHT: 74 IN | HEART RATE: 62 BPM | WEIGHT: 262 LBS | BODY MASS INDEX: 33.62 KG/M2 | OXYGEN SATURATION: 98 % | SYSTOLIC BLOOD PRESSURE: 130 MMHG

## 2024-03-14 DIAGNOSIS — I35.8 AORTIC VALVE SCLEROSIS: ICD-10-CM

## 2024-03-14 DIAGNOSIS — I48.0 PAROXYSMAL ATRIAL FIBRILLATION (HCC): ICD-10-CM

## 2024-03-14 DIAGNOSIS — I25.10 CORONARY ARTERY DISEASE INVOLVING NATIVE CORONARY ARTERY OF NATIVE HEART WITHOUT ANGINA PECTORIS: ICD-10-CM

## 2024-03-14 DIAGNOSIS — I10 ESSENTIAL HYPERTENSION: Primary | ICD-10-CM

## 2024-03-14 PROCEDURE — 1036F TOBACCO NON-USER: CPT | Performed by: CLINICAL NURSE SPECIALIST

## 2024-03-14 PROCEDURE — G8427 DOCREV CUR MEDS BY ELIG CLIN: HCPCS | Performed by: CLINICAL NURSE SPECIALIST

## 2024-03-14 PROCEDURE — G8484 FLU IMMUNIZE NO ADMIN: HCPCS | Performed by: CLINICAL NURSE SPECIALIST

## 2024-03-14 PROCEDURE — 1123F ACP DISCUSS/DSCN MKR DOCD: CPT | Performed by: CLINICAL NURSE SPECIALIST

## 2024-03-14 PROCEDURE — 99213 OFFICE O/P EST LOW 20 MIN: CPT | Performed by: CLINICAL NURSE SPECIALIST

## 2024-03-14 PROCEDURE — 3079F DIAST BP 80-89 MM HG: CPT | Performed by: CLINICAL NURSE SPECIALIST

## 2024-03-14 PROCEDURE — 3017F COLORECTAL CA SCREEN DOC REV: CPT | Performed by: CLINICAL NURSE SPECIALIST

## 2024-03-14 PROCEDURE — G8417 CALC BMI ABV UP PARAM F/U: HCPCS | Performed by: CLINICAL NURSE SPECIALIST

## 2024-03-14 PROCEDURE — 3075F SYST BP GE 130 - 139MM HG: CPT | Performed by: CLINICAL NURSE SPECIALIST

## 2024-03-14 RX ORDER — LISINOPRIL 10 MG/1
10 TABLET ORAL DAILY
Qty: 180 TABLET | Refills: 3 | Status: SHIPPED | OUTPATIENT
Start: 2024-03-14

## 2024-03-14 NOTE — PROGRESS NOTES
answered    Plan    Increase your lisinopril to twice a day    Maintain good blood pressure control-goal<130/80 at rest    Maintain good cholesterol control LDL goal<70 with arterial disease  If you are diabetic work to keep/obtain hemoglobin A1c< 7    Follow up in June with Dr. Maier  Call with any questions or concerns  Follow up with Momo Crouch MD for non cardiac problems  Report any new problems  Cardiovascular Fitness-Exercise as tolerated.  Strive for 30 minutes of exercise most days of the week.    Cardiac / Healthy Diet- Avoid processed high fat foods, maintain low sodium/salt   Continue current medications as directed  Continue plan of treatment  It is always recommended that you bring your medications bottles with you to each visit - this is for your safety!       CHEIKH Emerson    EMR dragon/transcription disclaimer: Much of this encounter note is electronic transcription/translation of spoken language to printed tach. Electronic translation of spoken language may be erroneous, or at times, nonsensical words or phrases may be inadvertently transcribed. Although, I have reviewed the note for such errors, some may still exist.

## 2024-03-14 NOTE — PATIENT INSTRUCTIONS
Increase your lisinopril to twice a day    Maintain good blood pressure control-goal<130/80 at rest    Maintain good cholesterol control LDL goal<70 with arterial disease  If you are diabetic work to keep/obtain hemoglobin A1c< 7    Follow up in June with Dr. Maier  Call with any questions or concerns  Follow up with Momo Crouch MD for non cardiac problems  Report any new problems  Cardiovascular Fitness-Exercise as tolerated.  Strive for 30 minutes of exercise most days of the week.    Cardiac / Healthy Diet- Avoid processed high fat foods, maintain low sodium/salt   Continue current medications as directed  Continue plan of treatment  It is always recommended that you bring your medications bottles with you to each visit - this is for your safety!

## 2024-04-17 ENCOUNTER — OFFICE VISIT (OUTPATIENT)
Dept: ENT CLINIC | Age: 74
End: 2024-04-17
Payer: MEDICARE

## 2024-04-17 VITALS
HEIGHT: 74 IN | SYSTOLIC BLOOD PRESSURE: 130 MMHG | WEIGHT: 256 LBS | DIASTOLIC BLOOD PRESSURE: 84 MMHG | BODY MASS INDEX: 32.85 KG/M2

## 2024-04-17 DIAGNOSIS — H61.21 RIGHT EAR IMPACTED CERUMEN: ICD-10-CM

## 2024-04-17 DIAGNOSIS — H90.3 SENSORINEURAL HEARING LOSS (SNHL) OF BOTH EARS: Primary | ICD-10-CM

## 2024-04-17 PROCEDURE — G8417 CALC BMI ABV UP PARAM F/U: HCPCS | Performed by: OTOLARYNGOLOGY

## 2024-04-17 PROCEDURE — G8427 DOCREV CUR MEDS BY ELIG CLIN: HCPCS | Performed by: OTOLARYNGOLOGY

## 2024-04-17 PROCEDURE — 1123F ACP DISCUSS/DSCN MKR DOCD: CPT | Performed by: OTOLARYNGOLOGY

## 2024-04-17 PROCEDURE — 69210 REMOVE IMPACTED EAR WAX UNI: CPT | Performed by: OTOLARYNGOLOGY

## 2024-04-17 PROCEDURE — 3075F SYST BP GE 130 - 139MM HG: CPT | Performed by: OTOLARYNGOLOGY

## 2024-04-17 PROCEDURE — 99213 OFFICE O/P EST LOW 20 MIN: CPT | Performed by: OTOLARYNGOLOGY

## 2024-04-17 PROCEDURE — 3079F DIAST BP 80-89 MM HG: CPT | Performed by: OTOLARYNGOLOGY

## 2024-04-17 PROCEDURE — 1036F TOBACCO NON-USER: CPT | Performed by: OTOLARYNGOLOGY

## 2024-04-17 PROCEDURE — 3017F COLORECTAL CA SCREEN DOC REV: CPT | Performed by: OTOLARYNGOLOGY

## 2024-04-17 ASSESSMENT — ENCOUNTER SYMPTOMS
ALLERGIC/IMMUNOLOGIC NEGATIVE: 1
RESPIRATORY NEGATIVE: 1
GASTROINTESTINAL NEGATIVE: 1
EYES NEGATIVE: 1

## 2024-04-17 NOTE — PROGRESS NOTES
2024    Meet Jiménez (:  1950) is a 74 y.o. male, Established patient, here for evaluation of the following chief complaint(s):  Follow-up (Ears)      Vitals:    24 1346   BP: 130/84   Weight: 116.1 kg (256 lb)   Height: 1.88 m (6' 2\")       Wt Readings from Last 3 Encounters:   24 116.1 kg (256 lb)   24 118.8 kg (262 lb)   24 117 kg (258 lb)       BP Readings from Last 3 Encounters:   24 130/84   24 130/88   24 (!) 182/90         SUBJECTIVE/OBJECTIVE:    Patient seen today for his years.  I been treating him for eustachian dysfunction the last visit open 1 Ciprodex and he says his ears feel normal now.  He is hearing better with no pain.        Review of Systems   Constitutional: Negative.    HENT:  Positive for hearing loss.    Eyes: Negative.    Respiratory: Negative.     Cardiovascular: Negative.    Gastrointestinal: Negative.    Endocrine: Negative.    Musculoskeletal: Negative.    Skin: Negative.    Allergic/Immunologic: Negative.    Neurological: Negative.    Hematological: Negative.    Psychiatric/Behavioral: Negative.          Physical Exam  Vitals reviewed.   Constitutional:       Appearance: Normal appearance. He is normal weight.   HENT:      Head: Normocephalic and atraumatic.      Right Ear: Tympanic membrane, ear canal and external ear normal. There is impacted cerumen.      Left Ear: Tympanic membrane, ear canal and external ear normal.      Nose: Nose normal.      Mouth/Throat:      Mouth: Mucous membranes are moist.      Pharynx: Oropharynx is clear.   Eyes:      Extraocular Movements: Extraocular movements intact.      Pupils: Pupils are equal, round, and reactive to light.   Cardiovascular:      Rate and Rhythm: Normal rate and regular rhythm.   Pulmonary:      Effort: Pulmonary effort is normal.      Breath sounds: Normal breath sounds.   Musculoskeletal:      Cervical back: Normal range of motion.   Skin:     General: Skin is

## 2024-04-24 DIAGNOSIS — I48.91 ATRIAL FIBRILLATION, UNSPECIFIED TYPE (HCC): ICD-10-CM

## 2024-04-25 RX ORDER — APIXABAN 5 MG/1
TABLET, FILM COATED ORAL
Qty: 180 TABLET | Refills: 0 | Status: SHIPPED | OUTPATIENT
Start: 2024-04-25

## 2024-05-13 ENCOUNTER — TELEPHONE (OUTPATIENT)
Dept: CARDIOLOGY CLINIC | Age: 74
End: 2024-05-13

## 2024-05-13 NOTE — TELEPHONE ENCOUNTER
PT called stating his BP was 170/92 on Friday 5-10-24, before he took his med.     This am before meds 168-89.   Pt advised to take BP meds waiti 2 hours and check BP due this x 1 week and call back with the readings.

## 2024-05-20 RX ORDER — AMIODARONE HYDROCHLORIDE 200 MG/1
TABLET ORAL
Qty: 90 TABLET | Refills: 3 | Status: SHIPPED | OUTPATIENT
Start: 2024-05-20

## 2024-05-31 RX ORDER — CARVEDILOL 6.25 MG/1
TABLET ORAL
Qty: 180 TABLET | Refills: 1 | Status: SHIPPED | OUTPATIENT
Start: 2024-05-31

## 2024-06-11 ENCOUNTER — TELEPHONE (OUTPATIENT)
Dept: CARDIOLOGY CLINIC | Age: 74
End: 2024-06-11

## 2024-06-11 NOTE — TELEPHONE ENCOUNTER
Date: TBD    Cardiologist: Darrion    Procedure: Tooth Extraction    Surgeon: Tosin    Last Office Visit: 3/14/24  Reason for office visit and medical concerns addressed at this office visit: cardiomyopathy, afib, htn, hyperlipidemia, cad,     Testing Performed and Date of Service:  5/22/23 Echo  LV is normal in size with normal systolic function. LV ejection fraction   estimated 60%.   Moderate concentric LVH with grade 2 diastolic dysfunction.   RV is normal in size with normal systolic function.   Moderate to severe left atrial enlargement.   Normal right atrial size.   Aortic valve appears trileaflet with thickened and calcified right   coronary leaflet with restricted mobility. Aortic sclerosis without   stenosis. Mild aortic regurgitation.   Mitral valve is mildly thickened with preserved leaflet mobility. No   stenosis. Mild mitral regurgitation.   Trace tricuspid regurgitation.   No significant pericardial effusion.       Does the patient have a stent? If so, what type?  none    Current Medications: lisinopril, eliquis, coreg, atorvastatin, aspirin, amiodarone    Is the patient currently taking an anticoagulant? If so, what is the diagnosis the patient has been given to warrant the need for the anticoagulant? Eliquis and aspirin for afib    Additional Notes: requesting to hold eliquis and aspirin

## 2024-06-12 ENCOUNTER — OFFICE VISIT (OUTPATIENT)
Dept: CARDIOLOGY CLINIC | Age: 74
End: 2024-06-12
Payer: MEDICARE

## 2024-06-12 VITALS
WEIGHT: 252 LBS | DIASTOLIC BLOOD PRESSURE: 84 MMHG | SYSTOLIC BLOOD PRESSURE: 160 MMHG | HEART RATE: 58 BPM | HEIGHT: 75 IN | BODY MASS INDEX: 31.33 KG/M2

## 2024-06-12 DIAGNOSIS — I10 ESSENTIAL HYPERTENSION: ICD-10-CM

## 2024-06-12 DIAGNOSIS — I48.91 ATRIAL FIBRILLATION, UNSPECIFIED TYPE (HCC): ICD-10-CM

## 2024-06-12 DIAGNOSIS — I48.0 PAROXYSMAL ATRIAL FIBRILLATION (HCC): Primary | ICD-10-CM

## 2024-06-12 PROCEDURE — G8427 DOCREV CUR MEDS BY ELIG CLIN: HCPCS | Performed by: INTERNAL MEDICINE

## 2024-06-12 PROCEDURE — 1036F TOBACCO NON-USER: CPT | Performed by: INTERNAL MEDICINE

## 2024-06-12 PROCEDURE — 3079F DIAST BP 80-89 MM HG: CPT | Performed by: INTERNAL MEDICINE

## 2024-06-12 PROCEDURE — G8417 CALC BMI ABV UP PARAM F/U: HCPCS | Performed by: INTERNAL MEDICINE

## 2024-06-12 PROCEDURE — 99214 OFFICE O/P EST MOD 30 MIN: CPT | Performed by: INTERNAL MEDICINE

## 2024-06-12 PROCEDURE — 93000 ELECTROCARDIOGRAM COMPLETE: CPT | Performed by: INTERNAL MEDICINE

## 2024-06-12 PROCEDURE — 3077F SYST BP >= 140 MM HG: CPT | Performed by: INTERNAL MEDICINE

## 2024-06-12 PROCEDURE — 3017F COLORECTAL CA SCREEN DOC REV: CPT | Performed by: INTERNAL MEDICINE

## 2024-06-12 PROCEDURE — 1123F ACP DISCUSS/DSCN MKR DOCD: CPT | Performed by: INTERNAL MEDICINE

## 2024-06-12 RX ORDER — LISINOPRIL 10 MG/1
10 TABLET ORAL 2 TIMES DAILY
Qty: 180 TABLET | Refills: 3 | Status: SHIPPED | OUTPATIENT
Start: 2024-06-12

## 2024-06-12 ASSESSMENT — ENCOUNTER SYMPTOMS
WHEEZING: 0
DIARRHEA: 0
BLOOD IN STOOL: 0
BACK PAIN: 0
COUGH: 0
SHORTNESS OF BREATH: 0
ABDOMINAL DISTENTION: 0
VOMITING: 0
ABDOMINAL PAIN: 0

## 2024-06-12 NOTE — PROGRESS NOTES
[facility]  Cardiology Office Note  [address], [place]  [address]  Phone: [phone]  Fax: [phone]                            Date:  6/12/2024  Patient: Meet Jiménez  Age:  74 y.o., 1950    Referral: No ref. provider found      PROBLEM LIST:    Patient Active Problem List    Diagnosis Date Noted    On amiodarone therapy 01/07/2023     Priority: Medium    Middle ear effusion, left 10/11/2023     Priority: Low    Asymmetrical hearing loss 09/20/2023     Priority: Low    Retinal artery occlusion 10/08/2021     Priority: Low    Symptomatic stenosis of right carotid artery 10/08/2021     Priority: Low    Atrial fibrillation (HCC) 10/14/2020     Priority: Low    Essential hypertension 03/13/2018     Priority: Low    Prostate cancer (HCC)      Priority: Low    Mixed hyperlipidemia      Priority: Low     1.  Paroxysmal atrial fibrillation, DCCV 12/3/2020, on Eliquis and amiodarone.  2.  Coronary artery disease by catheterization 12/3/2020, single-vessel with calcific mid to distal LAD 60%, asymptomatic, prior EF 35% improving to 60 to 65% (ECHO 3/2021), probable mild aortic stenosis.  3.  Acute right eye vision loss with CRAO 10/2021, right ICA 65% status post right CEA 10/22/2021.     PRESENTATION: Meet Jiménez is a 74 y.o. year old male presents for follow-up evaluation.  He has been doing quite well with no significant issues.  No chest pain or shortness of breath.  Reports he can do most activities that he wants to do.  No leg swelling.  No recurrent atrial fibrillation reported.  Is planning to have his teeth pulled.  Years of prior tobacco chewing.    REVIEW OF SYSTEMS:  Review of Systems   Constitutional:  Negative for activity change, diaphoresis and fatigue.   HENT:  Negative for hearing loss, nosebleeds and tinnitus.    Eyes:  Negative for visual disturbance.   Respiratory:  Negative for cough, shortness of breath and wheezing.

## 2024-06-12 NOTE — TELEPHONE ENCOUNTER
Yonas Maier MD  You16 hours ago (4:46 PM)       Okay to hold Eliquis 2 days prior to procedure and restart as soon as possible.  Would prefer patient continue aspirin unless absolutely contraindicated.  May hold for 4 days if needed and restart after procedure.

## 2024-07-15 DIAGNOSIS — E78.2 MIXED HYPERLIPIDEMIA: ICD-10-CM

## 2024-07-15 DIAGNOSIS — Z12.5 SCREENING PSA (PROSTATE SPECIFIC ANTIGEN): Primary | ICD-10-CM

## 2024-07-15 DIAGNOSIS — Z00.00 ANNUAL PHYSICAL EXAM: Primary | ICD-10-CM

## 2024-07-15 DIAGNOSIS — Z00.00 ANNUAL PHYSICAL EXAM: ICD-10-CM

## 2024-07-15 LAB
ALBUMIN SERPL-MCNC: 4 G/DL (ref 3.5–5.2)
ALP SERPL-CCNC: 90 U/L (ref 40–130)
ALT SERPL-CCNC: 22 U/L (ref 5–41)
ANION GAP SERPL CALCULATED.3IONS-SCNC: 10 MMOL/L (ref 7–19)
AST SERPL-CCNC: 19 U/L (ref 5–40)
BASOPHILS # BLD: 0.1 K/UL (ref 0–0.2)
BASOPHILS NFR BLD: 1 % (ref 0–1)
BILIRUB SERPL-MCNC: 0.6 MG/DL (ref 0.2–1.2)
BUN SERPL-MCNC: 24 MG/DL (ref 8–23)
CALCIUM SERPL-MCNC: 9.3 MG/DL (ref 8.8–10.2)
CHLORIDE SERPL-SCNC: 104 MMOL/L (ref 98–111)
CHOLEST SERPL-MCNC: 141 MG/DL (ref 160–199)
CO2 SERPL-SCNC: 26 MMOL/L (ref 22–29)
CREAT SERPL-MCNC: 1.3 MG/DL (ref 0.5–1.2)
EOSINOPHIL # BLD: 0.1 K/UL (ref 0–0.6)
EOSINOPHIL NFR BLD: 1.1 % (ref 0–5)
ERYTHROCYTE [DISTWIDTH] IN BLOOD BY AUTOMATED COUNT: 14.8 % (ref 11.5–14.5)
GLUCOSE P FAST SERPL-MCNC: 110 MG/DL (ref 74–109)
HCT VFR BLD AUTO: 43.1 % (ref 42–52)
HDLC SERPL-MCNC: 55 MG/DL (ref 55–121)
HGB BLD-MCNC: 13.6 G/DL (ref 14–18)
IMM GRANULOCYTES # BLD: 0 K/UL
LDLC SERPL CALC-MCNC: 68 MG/DL
LYMPHOCYTES # BLD: 1.3 K/UL (ref 1.1–4.5)
LYMPHOCYTES NFR BLD: 25.4 % (ref 20–40)
MCH RBC QN AUTO: 30.8 PG (ref 27–31)
MCHC RBC AUTO-ENTMCNC: 31.6 G/DL (ref 33–37)
MCV RBC AUTO: 97.7 FL (ref 80–94)
MONOCYTES # BLD: 0.6 K/UL (ref 0–0.9)
MONOCYTES NFR BLD: 11.1 % (ref 0–10)
NEUTROPHILS # BLD: 3.2 K/UL (ref 1.5–7.5)
NEUTS SEG NFR BLD: 60.8 % (ref 50–65)
PLATELET # BLD AUTO: 184 K/UL (ref 130–400)
PMV BLD AUTO: 11.7 FL (ref 9.4–12.4)
POTASSIUM SERPL-SCNC: 4.7 MMOL/L (ref 3.5–5)
PROT SERPL-MCNC: 7 G/DL (ref 6.6–8.7)
PSA SERPL-MCNC: 0.01 NG/ML (ref 0–4)
RBC # BLD AUTO: 4.41 M/UL (ref 4.7–6.1)
SODIUM SERPL-SCNC: 140 MMOL/L (ref 136–145)
T4 FREE SERPL-MCNC: 1.54 NG/DL (ref 0.93–1.7)
TRIGL SERPL-MCNC: 92 MG/DL (ref 0–149)
TSH SERPL DL<=0.005 MIU/L-ACNC: 1.58 UIU/ML (ref 0.27–4.2)
WBC # BLD AUTO: 5.2 K/UL (ref 4.8–10.8)

## 2024-07-18 ENCOUNTER — OFFICE VISIT (OUTPATIENT)
Dept: PRIMARY CARE CLINIC | Age: 74
End: 2024-07-18
Payer: MEDICARE

## 2024-07-18 VITALS
OXYGEN SATURATION: 97 % | HEIGHT: 75 IN | HEART RATE: 56 BPM | BODY MASS INDEX: 31.08 KG/M2 | SYSTOLIC BLOOD PRESSURE: 120 MMHG | DIASTOLIC BLOOD PRESSURE: 78 MMHG | WEIGHT: 250 LBS

## 2024-07-18 DIAGNOSIS — I10 ESSENTIAL HYPERTENSION: ICD-10-CM

## 2024-07-18 DIAGNOSIS — D68.69 SECONDARY HYPERCOAGULABLE STATE (HCC): ICD-10-CM

## 2024-07-18 DIAGNOSIS — I48.0 PAROXYSMAL ATRIAL FIBRILLATION (HCC): ICD-10-CM

## 2024-07-18 DIAGNOSIS — Z85.46 PERSONAL HISTORY OF PROSTATE CANCER: ICD-10-CM

## 2024-07-18 DIAGNOSIS — E78.2 MIXED HYPERLIPIDEMIA: ICD-10-CM

## 2024-07-18 DIAGNOSIS — Z00.00 MEDICARE ANNUAL WELLNESS VISIT, SUBSEQUENT: Primary | ICD-10-CM

## 2024-07-18 DIAGNOSIS — E61.1 IRON DEFICIENCY: ICD-10-CM

## 2024-07-18 PROBLEM — M79.674 PAIN OF TOE OF RIGHT FOOT: Status: ACTIVE | Noted: 2023-06-27

## 2024-07-18 PROBLEM — B35.1 ONYCHOMYCOSIS: Status: ACTIVE | Noted: 2023-06-27

## 2024-07-18 PROBLEM — L60.3 DYSTROPHIA UNGUIUM: Status: ACTIVE | Noted: 2023-06-27

## 2024-07-18 PROBLEM — M79.675 PAIN OF TOE OF LEFT FOOT: Status: ACTIVE | Noted: 2023-06-27

## 2024-07-18 PROCEDURE — 3017F COLORECTAL CA SCREEN DOC REV: CPT | Performed by: FAMILY MEDICINE

## 2024-07-18 PROCEDURE — 1123F ACP DISCUSS/DSCN MKR DOCD: CPT | Performed by: FAMILY MEDICINE

## 2024-07-18 PROCEDURE — 3078F DIAST BP <80 MM HG: CPT | Performed by: FAMILY MEDICINE

## 2024-07-18 PROCEDURE — G0439 PPPS, SUBSEQ VISIT: HCPCS | Performed by: FAMILY MEDICINE

## 2024-07-18 PROCEDURE — 3074F SYST BP LT 130 MM HG: CPT | Performed by: FAMILY MEDICINE

## 2024-07-18 RX ORDER — ATORVASTATIN CALCIUM 40 MG/1
40 TABLET, FILM COATED ORAL DAILY
Qty: 90 TABLET | Refills: 3 | Status: SHIPPED | OUTPATIENT
Start: 2024-07-18

## 2024-07-18 RX ORDER — FERROUS SULFATE 325(65) MG
325 TABLET ORAL
Qty: 90 TABLET | Refills: 1 | Status: SHIPPED | OUTPATIENT
Start: 2024-07-18

## 2024-07-18 ASSESSMENT — PATIENT HEALTH QUESTIONNAIRE - PHQ9
SUM OF ALL RESPONSES TO PHQ QUESTIONS 1-9: 0
1. LITTLE INTEREST OR PLEASURE IN DOING THINGS: NOT AT ALL
2. FEELING DOWN, DEPRESSED OR HOPELESS: NOT AT ALL
2. FEELING DOWN, DEPRESSED OR HOPELESS: NOT AT ALL
SUM OF ALL RESPONSES TO PHQ QUESTIONS 1-9: 0
SUM OF ALL RESPONSES TO PHQ QUESTIONS 1-9: 0
SUM OF ALL RESPONSES TO PHQ9 QUESTIONS 1 & 2: 0
SUM OF ALL RESPONSES TO PHQ QUESTIONS 1-9: 0
SUM OF ALL RESPONSES TO PHQ9 QUESTIONS 1 & 2: 0
SUM OF ALL RESPONSES TO PHQ QUESTIONS 1-9: 0
SUM OF ALL RESPONSES TO PHQ QUESTIONS 1-9: 0
1. LITTLE INTEREST OR PLEASURE IN DOING THINGS: NOT AT ALL

## 2024-07-18 ASSESSMENT — LIFESTYLE VARIABLES
HOW MANY STANDARD DRINKS CONTAINING ALCOHOL DO YOU HAVE ON A TYPICAL DAY: 3 OR 4
HAS A RELATIVE, FRIEND, DOCTOR, OR ANOTHER HEALTH PROFESSIONAL EXPRESSED CONCERN ABOUT YOUR DRINKING OR SUGGESTED YOU CUT DOWN: NO
HOW OFTEN DURING THE LAST YEAR HAVE YOU HAD A FEELING OF GUILT OR REMORSE AFTER DRINKING: NEVER
HOW OFTEN DURING THE LAST YEAR HAVE YOU FOUND THAT YOU WERE NOT ABLE TO STOP DRINKING ONCE YOU HAD STARTED: NEVER
HAVE YOU OR SOMEONE ELSE BEEN INJURED AS A RESULT OF YOUR DRINKING: NO
HOW OFTEN DURING THE LAST YEAR HAVE YOU NEEDED AN ALCOHOLIC DRINK FIRST THING IN THE MORNING TO GET YOURSELF GOING AFTER A NIGHT OF HEAVY DRINKING: NEVER
HOW OFTEN DURING THE LAST YEAR HAVE YOU FAILED TO DO WHAT WAS NORMALLY EXPECTED FROM YOU BECAUSE OF DRINKING: NEVER
HOW OFTEN DO YOU HAVE A DRINK CONTAINING ALCOHOL: 2-4 TIMES A MONTH
HOW OFTEN DURING THE LAST YEAR HAVE YOU BEEN UNABLE TO REMEMBER WHAT HAPPENED THE NIGHT BEFORE BECAUSE YOU HAD BEEN DRINKING: NEVER

## 2024-07-18 NOTE — PROGRESS NOTES
Poor Eating Habits/Diet:  Do you eat balanced/healthy meals regularly?: (!) No  Interventions:  See AVS for additional education material  Abnormal BMI (obese):  Body mass index is 31.25 kg/m². (!) Abnormal  Interventions:  See AVS for additional education material             Advanced Directives:  Do you have a Living Will?: (!) No    Intervention:                       Objective   Vitals:    07/18/24 1009   BP: 120/78   Pulse: 56   SpO2: 97%   Weight: 113.4 kg (250 lb)   Height: 1.905 m (6' 3\")      Body mass index is 31.25 kg/m².                No Known Allergies  Prior to Visit Medications    Medication Sig Taking? Authorizing Provider   atorvastatin (LIPITOR) 40 MG tablet Take 1 tablet by mouth daily Yes Momo Crouch MD   ferrous sulfate (IRON 325) 325 (65 Fe) MG tablet Take 1 tablet by mouth daily (with breakfast) Yes Momo Crocuh MD   lisinopril (PRINIVIL;ZESTRIL) 10 MG tablet Take 1 tablet by mouth in the morning and at bedtime Yes Yonas Maier MD   carvedilol (COREG) 6.25 MG tablet TAKE 1 TABLET BY MOUTH TWICE A DAY Yes Di Leonardo APRN - NP   amiodarone (CORDARONE) 200 MG tablet TAKE 1 TABLET BY MOUTH EVERY DAY Yes Magalys Rea APRN   ELIQUIS 5 MG TABS tablet TAKE 1 TABLET TWICE A DAY Yes Kassy Richardson APRN   aspirin 81 MG EC tablet Take 1 tablet by mouth daily Yes Provider, MD Mayito Flores (Including outside providers/suppliers regularly involved in providing care):   Patient Care Team:  Momo Crouch MD as PCP - General (Family Medicine)  Momo Crouch MD as PCP - Empaneled Provider  Di Leonardo APRN - NP as Nurse Practitioner (Nurse Practitioner Adult Health)  Anderson Trotter MD as Consulting Physician (Vascular Surgery)  Scott Viera DO as Consulting Physician (Neurology)      Reviewed and updated this visit:  Tobacco  Allergies  Meds  Problems  Med Hx  Surg Hx  Soc Hx  Fam Hx         7/18/24    11:02 AM CDT

## 2024-08-13 DIAGNOSIS — I48.91 ATRIAL FIBRILLATION, UNSPECIFIED TYPE (HCC): ICD-10-CM

## 2024-08-13 RX ORDER — APIXABAN 5 MG/1
TABLET, FILM COATED ORAL
Qty: 180 TABLET | Refills: 3 | Status: SHIPPED | OUTPATIENT
Start: 2024-08-13

## 2024-12-02 RX ORDER — CARVEDILOL 6.25 MG/1
TABLET ORAL
Qty: 180 TABLET | Refills: 1 | Status: SHIPPED | OUTPATIENT
Start: 2024-12-02

## 2024-12-06 NOTE — PROGRESS NOTES
[facility] Neurology Office Note      Patient:   Angella Ontiveros  MR#:    [id number]  Account Number:                         YOB: 1950  Date of Evaluation:  10/18/2021  Time of Note:                          8:51 AM  Primary/Referring Physician:  CHEIKH Aviles   Consulting Physician:  Brunilda Rosas DO    NEW PATIENT CONSULTATION    Chief Complaint   Patient presents with   [employer] ED Follow-up     ED follow stroke        HISTORY OF PRESENT ILLNESS    Angella Ontiveros is a 66y.o. year old male here for monocular visual loss. Patient noted sudden visual loss in the right eye back a few weeks ago. Patient woke up with loss of vision in the right eye. No facial drooping, slurred speech or weakness noted. No prior stroke history. Did have a fall a couple nights before. Currently on Eliquis for a fib, no missed dosing. On ASA as well. Taking statin, and anti-hypertensive as well. Seen by ophthalmology, diagnosed with CRAO. MRI with nothing acute. CTA neck 65% stenosis in the proximal right ICA. CTA head with 70% distal right ICA stenosis. ECHO from march of this year with EF 60-65%, no mass.      Past Medical History:   Diagnosis Date    Adult general medical exam     CAD (coronary artery disease)     Elevated blood pressure reading     Erectile dysfunction     Hypertension     Mixed hyperlipidemia     Osteoarthritis     Prostate cancer [facility])        Past Surgical History:   Procedure Laterality Date    CARDIAC CATHETERIZATION  12/03/2020    EF 35%, moderate diffuse disease in LAD    CARDIOVERSION      DIAGNOSTIC CARDIAC CATH LAB PROCEDURE  12/03/2020    HIP SURGERY      KNEE SURGERY      PROSTATECTOMY         Family History   Problem Relation Age of Onset    Hypertension Mother     Coronary Art Dis Mother     Coronary Art Dis Father     Mult Sclerosis Sister        Social History     Socioeconomic History    Marital status:      Spouse name: Not on file    Number of children: Not on file    Years of education: Not on file    Highest education level: Not on file   Occupational History    Not on file   Tobacco Use    Smoking status: Former Smoker     Packs/day: 0.25     Years: 10.00     Pack years: 2.50     Types: Cigars     Quit date: 1975     Years since quittin.1    Smokeless tobacco: Former User     Types: Chew   Vaping Use    Vaping Use: Never used   Substance and Sexual Activity    Alcohol use: Yes     Comment: social    Drug use: No    Sexual activity: Not Currently   Other Topics Concern    Not on file   Social History Narrative    Not on file     Social Determinants of Health     Financial Resource Strain:     Difficulty of Paying Living Expenses:    Food Insecurity:     Worried About [employer] in the Last Year:     [employer] in the Last Year:    Transportation Needs:     Lack of Transportation (Medical):  Lack of Transportation (Non-Medical):    Physical Activity:     Days of Exercise per Week:     Minutes of Exercise per Session:    Stress:     Feeling of Stress :    Social Connections:     Frequency of Communication with Friends and Family:     Frequency of Social Gatherings with Friends and Family:     Attends [employer] Services:     Active Member of Clubs or Organizations:     Attends Club or Organization Meetings:     Marital Status:    Intimate Partner Violence:     Fear of Current or Ex-Partner:     Emotionally Abused:     Physically Abused:     Sexually Abused:        Current Outpatient Medications   Medication Sig Dispense Refill    atorvastatin (LIPITOR) 40 MG tablet Take 1 tablet by mouth daily 90 tablet 3    aspirin EC 81 MG EC tablet Take 1 tablet by mouth daily 90 tablet 1    apixaban (ELIQUIS) 5 MG TABS tablet Take 1 tablet by mouth 2 times daily 14 tablet 0    amiodarone (CORDARONE) 200 MG tablet Take 1 tablet by mouth daily Take amiodarone 200 mg twice daily for 10 days and then 200 mg p.o. daily. (Patient taking differently: Take 200 mg by mouth daily ) 100 tablet 5    carvedilol (COREG) 6.25 MG tablet Take 1 tablet by mouth 2 times daily 180 tablet 5    lisinopril (PRINIVIL;ZESTRIL) 10 MG tablet TAKE 1 TABLET DAILY 90 tablet 3     No current facility-administered medications for this visit. ALLERGIES   No Known Allergies      REVIEW OF SYSTEMS    Constitutional: []Fever []Sweat []Chills [] Recent Injury [x] Denies all unless marked  HEENT:[]Headache  [] Head Injury/Hearing Loss  [] Sore Throat  [] Ear Ache/Dizziness  [x] Denies all unless marked  Spine:  [] Neck pain  [] Back pain  [] Sciaticia  [x] Denies all unless marked  Cardiovascular:[]Heart Disease []Chest Pain [] Palpitations  [x] Denies all unless marked  Pulmonary: []Shortness of Breath []Cough   [x] Denies all unless marke  Gastrointestinal: []Nausea  []Vomiting  []Abdominal Pain  []Constipation  []Diarrhea  []Dark Bloody Stools  [x] Denies all unless marked  Psychiatric/Behavioral:[x] Depression [] Anxiety [x] Denies all unless marked  Genitourinary:   [] Frequency  [] Urgency  [] Incontinence [] Pain with Urination  [x] Denies all unless marked  Extremities: []Pain  []Swelling  [x] Denies all unless marked  Musculoskeletal: [] Muscle Pain  [] Joint Pain  [] Arthritis [] Muscle Cramps [] Muscle Twitches  [x] Denies all unless marked  Sleep: [] Insomnia [] Snoring [] Restless Legs [] Sleep Apnea  [] Daytime Sleepiness  [x] Denies all unless marked  Skin:[] Rash [] Skin Discoloration [x] Denies all unless marked   Neurological: [x]Visual Disturbance/Memory Loss [x] Loss of Balance [] Slurred Speech/Weakness [] Seizures  [] Vertigo/Dizziness [x] Denies all unless marked     I have reviewed the above ROS with the patient and agree with the ROS as documented above.          PHYSICAL EXAM    Constitutional    /64   Pulse 69   Ht 6' 3\" (1.905 m)   Wt 238 lb (108 kg)   SpO2 97%   BMI 29.75 kg/m²   General appearance: No acute distress   EYES -   Conjunctiva normal  Pupillary exam as below, see CN exam in the neurologic exam  ENT-    No scars, masses, or lesions over external nose or ears  Hearing normal bilaterally to finger rub  Cardiovascular -   No clubbing, cyanosis, or edema   Pulmonary-   Good expansion, normal effort without use of accessory muscles  Musculoskeletal    No significant wasting of muscles noted  Gait as below, see gait exam in the neurologic exam  Muscle strength, tone, stability as below. No bony deformities  Skin    Warm, dry, and intact to inspection and palpation. No rash, erythema, or pallor  Psychiatric    Mood, affect, and behavior appear normal    Memory as below see mental status examination in the neurologic exam    NEUROLOGICAL EXAM    Mental status   [x]Awake, alert, oriented   [x]Affect attention and concentration appear appropriate  [x]Recent and remote memory appears unremarkable  [x]Speech normal without dysarthria or aphasia, comprehension and repetition intact. COMMENTS:    Cranial Nerves []No VF deficit to confrontation,  no papilledema on fundoscopic exam.  [x]PERRLA, EOMI, no nystagmus, conjugate eye movements, no ptosis  [x]Face symmetric  [x]Facial sensation intact  [x]Tongue midline no atrophy or fasciculations present  [x]Palate midline, hearing to finger rub normal bilaterally  [x]Shoulder shrug and SCM testing normal bilaterally  COMMENTS:  Light perception right eye, severe VF loss decreased VA    Motor   [x]5/5 strength x 4 extremities  [x]Normal bulk and tone  [x]No tremor present  [x]No rigidity or bradykinesia noted  COMMENTS:   Sensory  [x]Sensation intact to light touch, pin prick, vibration, and proprioception BLE  []Sensation intact to light touch, pin prick, vibration, and proprioception BUE  COMMENTS:   Coordination [x]FTN normal bilaterally   []HTS normal bilaterally  []AVI normal bilaterally.    COMMENTS:   Reflexes  [x]Symmetric and non-pathological  [x]Toes down going bilaterally  [x]No clonus present  COMMENTS:   Gait                  [x]Normal steady gait    []Ataxic    []Spastic     []Magnetic     []Shuffling  COMMENTS:       LABS RECORD AND IMAGING REVIEW (As below and per HPI)    Lab Results   Component Value Date    WBC 5.2 10/04/2021    HGB 14.6 10/04/2021    HCT 44.8 10/04/2021    MCV 99.3 (H) 10/04/2021     10/04/2021     Lab Results   Component Value Date     10/04/2021    K 4.7 10/04/2021     10/04/2021    CO2 27 10/04/2021    BUN 17 10/04/2021    CREATININE 0.9 10/04/2021    GLUCOSE 96 10/04/2021    CALCIUM 9.1 10/04/2021    PROT 6.9 10/04/2021    LABALBU 4.4 10/04/2021    BILITOT 0.9 10/04/2021    ALKPHOS 85 10/04/2021    AST 16 10/04/2021    ALT 15 10/04/2021    LABGLOM >60 10/04/2021    GFRAA >59 10/04/2021       CT Head WO Contrast    Result Date: 10/4/2021  EXAMINATION:  CT HEAD WO CONTRAST  10/4/2021 4:29 PM HISTORY: Right visual field loss with onset greater than 24 hours ago. TECHNIQUE: Multiple axial images were obtained through the brain without contrast infusion. Multiplanar images were reconstructed. DLP: 947 mGy-cm. Automated exposure control was utilized. COMPARISON: No comparison study. FINDINGS: There are no hemorrhage, edema or mass effect. There is mild atrophy with associated mild prominence of the lateral and third ventricles. There is low-density in the hemispheric white matter. 1. There are no hemorrhage, edema or mass effect. 2. Mild atrophy with associated mild prominence of the lateral and third ventricles. 3. Low-density in the hemispheric white matter is nonspecific and likely due to chronic small vessel disease. The full report of this exam was immediately signed and available to the emergency room. The patient is currently in the emergency room. Signed by Dr Ángela Alcazar    CTA Jhonnie Peeling    Result Date: 10/4/2021  CTA NECK W CONTRAST 10/4/2021 4:55 PM History: Hypertension and visual field loss.  Stroke symptoms. In order to have a CT radiation dose as low as reasonably achievable Automated Exposure Control was utilized for adjustment of the mA and/or KV according to patient size. DLP in mGycm= 728. CTA neck with contrast. CT angiography protocol. CT imaging with bolus IV contrast injection. Under concurrent supervision axial, sagittal, coronal, and three-dimensional data sets were constructed. Mild aortic arch calcification. Patent great vessel origins. Symmetric and normally patent common carotid arteries. LEFT Atherosclerotic calcification at the left ICA origin. Lumen diameter ratio = 5.1/5.8. 0% stenosis based on ACAS/NASCET criteria. Additional atherosclerotic calcification 15 mm beyond the left ICA origin. Lumen diameter ratio = 3.5/5.8. 40% stenosis based on ACAS/NASCET criteria. RIGHT Atherosclerotic calcification at the right ICA origin. Lumen diameter ratio = 5.9/5.6. 0% stenosis based on ACAS/NASCET criteria. Additional atherosclerotic calcification 14 mm beyond the right ICA origin. Lumen diameter ratio = 2.0/5.6. 65% stenosis based on ACAS/NASCET criteria. Each mid and distal internal carotid artery is normally patent. Bilateral patent though left dominant vertebral arteries. No complete arterial occlusion and no intraluminal thrombus is seen. There is moderate degenerative disc, endplate, and facet disease throughout the cervical spine. 1. Patchy atherosclerotic calcification within the proximal aspect of each internal carotid artery. 2. 40% stenosis within the proximal left ICA. 3. 65% stenosis within the proximal right ICA Signed by Dr Severino Ruffin Date: 10/5/2021    Addendum: The NASCET criteria was utilized for calculation of the carotid arterial stenosis. Signed by Dr Chato Herndon    Result Date: 10/5/2021  ** ORIGINAL REPORT ** Examination. CTA HEAD W CONTRAST 10/4/2021 2:57 PM History: Acute right visual field loss. DLP: 364 mGycm.  The CT angiography of the head is performed after intravenous contrast enhancement. The images are acquired in axial plane and subsequent 2-D reconstruction coronal and sagittal planes and 3-D maximum intensity projection reconstruction. There is no previous similar study for comparison. The correlation made with CT scan of the head obtained earlier today. Normal size internal carotid arteries are seen at the base of the skull. There are calcific plaques in the internal carotid arteries bilaterally in the carotid canal. No significant stenosis. Severe atheromatous changes are seen involving the internal carotid arteries in the cavernous sinus bilaterally. There is an area of significant, 70% stenosis involving the distal left internal carotid artery in the distal cavernous sinus. The suprasellar internal carotid arteries bilaterally appear normal. The right internal carotid artery divides into normal-appearing right middle cerebral artery and a relatively small anterior cerebral artery. A normal size right ophthalmic artery seen arising from the anterior margin of the internal carotid artery in the carotid canal. There is a relatively larger right posterior communicating artery. I do not see a discrete anterior communicating artery. The left internal carotid artery divides into normal-appearing left middle cerebral artery and a normal size left anterior cerebral artery. A relatively small and attenuated left ophthalmic artery seen. There is a relatively larger left posterior communicating artery. A small right and a dominant left vertebral artery enter the foramen magnum and join to make a normal-sized basilar artery which subsequently CAD very attenuated and the lunate after superior cerebellar arteries. The PCAs are predominantly made up by the posterior communicating arteries. Severe atheromatous changes of the internal carotid arteries bilaterally as detailed above.  A 70% focal stenosis of the distal left internal [Follow-Up] : a follow-up evaluation of carotid artery in the cavernous sinus. A normal size right ophthalmic artery. A small and attenuated left ophthalmic artery. The fetal type posterior circulation. Signed by Dr Ashanti Real** ADDENDUM #1 *    MRI BRAIN W WO CONTRAST    Result Date: 10/5/2021  Examination. MRI BRAIN W WO CONTRAST 10/4/2021 5:55 PM History: Acute onset the right visual field loss. History of prostate cancer. No previous history of a stroke. The multiplanar, multisequence MR imaging of the brain is performed before and after intravenous contrast enhancement. There is no previous study for comparison. The correlation is made with previous CT scan of the head and CT angiography of the head dated 9/4/2021. The diffusion-weighted imaging sequence including the ADC map show no areas of restricted diffusion. There is no evidence of a mass. No midline shift. Moderately prominent ventricles, basal cistern and the cortical sulci suggest moderate chronic volume loss. The orbits are suboptimally evaluated. No obvious intrinsic abnormality. The pituitary gland appears normal. The corpus callosum, the brainstem and cerebellum are normal. There are no areas of abnormal focal or diffuse contrast enhancement. The FLAIR sequence images show areas of chronic white matter ischemia in the periventricular and to a lesser extent deep subcortical white matter. The gradient recalled imaging sequence show normal flow/fat or in the limited visualized intracranial vessels. Paranasal sinuses appear unremarkable. There is bilateral mastoid effusion. No acute intracranial abnormality, particularly, no finding to explain patient's symptoms of unilateral vision loss. Moderate chronic ischemic and atrophic changes. Signed by Dr Ashanti Real      ASSESSMENT:    Kadie Sterling is a 66y.o. year old male here for monocular visual loss in the right eye. Seen by ophthalmology diagnosed with CRAO. Workup including MRI largely negative.   Moderate right proximal ICA stenosis noted. Recent ECHO negative. A fib history noted. PLAN:  1. Continue Eliquis 5 mg bid  2. Continue ASA 81 mg daily  3. Continue statin, anti-hypertensives, risk factor maximization. 4.  Follow up with ophthalmology as directed, request records. 5.  Plans to see vascular surgery tomorrow for right ICA stenosis. Medical management of intracranial stenosis.      Adela Buerger, DO  Board Certified Neurology

## 2024-12-12 ENCOUNTER — OFFICE VISIT (OUTPATIENT)
Dept: CARDIOLOGY CLINIC | Age: 74
End: 2024-12-12
Payer: MEDICARE

## 2024-12-12 VITALS
OXYGEN SATURATION: 96 % | BODY MASS INDEX: 32.08 KG/M2 | HEIGHT: 74 IN | HEART RATE: 62 BPM | WEIGHT: 250 LBS | DIASTOLIC BLOOD PRESSURE: 68 MMHG | SYSTOLIC BLOOD PRESSURE: 118 MMHG

## 2024-12-12 DIAGNOSIS — I48.0 PAROXYSMAL ATRIAL FIBRILLATION (HCC): Primary | ICD-10-CM

## 2024-12-12 DIAGNOSIS — I35.8 AORTIC VALVE SCLEROSIS: ICD-10-CM

## 2024-12-12 DIAGNOSIS — I10 ESSENTIAL HYPERTENSION: ICD-10-CM

## 2024-12-12 PROCEDURE — G8427 DOCREV CUR MEDS BY ELIG CLIN: HCPCS | Performed by: CLINICAL NURSE SPECIALIST

## 2024-12-12 PROCEDURE — 1160F RVW MEDS BY RX/DR IN RCRD: CPT | Performed by: CLINICAL NURSE SPECIALIST

## 2024-12-12 PROCEDURE — G8417 CALC BMI ABV UP PARAM F/U: HCPCS | Performed by: CLINICAL NURSE SPECIALIST

## 2024-12-12 PROCEDURE — 3074F SYST BP LT 130 MM HG: CPT | Performed by: CLINICAL NURSE SPECIALIST

## 2024-12-12 PROCEDURE — 1123F ACP DISCUSS/DSCN MKR DOCD: CPT | Performed by: CLINICAL NURSE SPECIALIST

## 2024-12-12 PROCEDURE — G8484 FLU IMMUNIZE NO ADMIN: HCPCS | Performed by: CLINICAL NURSE SPECIALIST

## 2024-12-12 PROCEDURE — 99213 OFFICE O/P EST LOW 20 MIN: CPT | Performed by: CLINICAL NURSE SPECIALIST

## 2024-12-12 PROCEDURE — 3017F COLORECTAL CA SCREEN DOC REV: CPT | Performed by: CLINICAL NURSE SPECIALIST

## 2024-12-12 PROCEDURE — 3078F DIAST BP <80 MM HG: CPT | Performed by: CLINICAL NURSE SPECIALIST

## 2024-12-12 PROCEDURE — 1159F MED LIST DOCD IN RCRD: CPT | Performed by: CLINICAL NURSE SPECIALIST

## 2024-12-12 PROCEDURE — 1036F TOBACCO NON-USER: CPT | Performed by: CLINICAL NURSE SPECIALIST

## 2024-12-12 NOTE — PROGRESS NOTES
Volume: 95 ml                    LV Systolic dimension: 3.07cm   LA Volume Index: 39 ml/m^2          LV PW diastolic: 1.22cm   LV dimension: 4.14 cm               LVOT diameter: 2.4 cm                                       RV Diastolic dimension: 2.89cm      Cardic Output (CO): 3.97l/min   Ascending Aorta: 4 cm     Doppler Measurements and Calculations      AV Peak Velocity:192 cm/s              MV Peak E-Wave: 95.5 cm/s   AV Mean Velocity:136 cm/s              MV Peak A-Wave: 55.3 cm/s   AV Peak Gradient: 14.75 mmHg           MV E/A Ratio: 1.73 %   AV Mean Gradient: 8 mmHg               MV Mean velocity: 56cm/s   AV Area (Continuity):1.57 cm^2         MV Peak Gradient: 3.65 mmHg   AV VTI:44.3 cm/s                       MV Mean gradient: 2 mmHg   TR Velocity:346 cm/s                   MV P1/2t: 146 msec   TR Gradient:47.89 mmHg                 MVA by PHT1.51 cm^2      MV E' septal velocity: 6.64cm/s   MV E' lateral velocity:6.96 cm/s       States taking medications as prescribed  Stable cardiovascular status. No evidence of overt heart failure, angina or dysrhythmia.   20 minutes were spent preparing, reviewing and seeing patient.  All questions answered    Plan      Maintain good blood pressure control-goal<130/80 at rest    Maintain good cholesterol control LDL goal<70 with arterial disease  If you are diabetic work to keep/obtain hemoglobin A1c< 7    Follow up in 6 mos   1 yr with Darrion   Call with any questions or concerns  Follow up with Momo Crouch MD for non cardiac problems  Report any new problems  Cardiovascular Fitness-Exercise as tolerated.  Strive for 30 minutes of exercise most days of the week.    Cardiac / Healthy Diet- Avoid processed high fat foods, maintain low sodium/salt   Continue current medications as directed  Continue plan of treatment  It is always recommended that you bring your medications bottles with you to each visit - this is for your safety!       CHEIKH Emerson    EMR

## 2024-12-12 NOTE — PATIENT INSTRUCTIONS
Maintain good blood pressure control-goal<130/80 at rest    Maintain good cholesterol control LDL goal<70 with arterial disease  If you are diabetic work to keep/obtain hemoglobin A1c< 7    Follow up in June with Dr. Maier  Call with any questions or concerns  Follow up with Momo Crouch MD for non cardiac problems  Report any new problems  Cardiovascular Fitness-Exercise as tolerated.  Strive for 30 minutes of exercise most days of the week.    Cardiac / Healthy Diet- Avoid processed high fat foods, maintain low sodium/salt   Continue current medications as directed  Continue plan of treatment  It is always recommended that you bring your medications bottles with you to each visit - this is for your safety!

## 2025-03-12 ENCOUNTER — HOSPITAL ENCOUNTER (OUTPATIENT)
Dept: VASCULAR LAB | Age: 75
Discharge: HOME | End: 2025-03-14
Payer: MEDICARE

## 2025-03-12 ENCOUNTER — OFFICE VISIT (OUTPATIENT)
Dept: VASCULAR SURGERY | Age: 75
End: 2025-03-12

## 2025-03-12 VITALS
OXYGEN SATURATION: 98 % | DIASTOLIC BLOOD PRESSURE: 68 MMHG | HEART RATE: 60 BPM | RESPIRATION RATE: 18 BRPM | SYSTOLIC BLOOD PRESSURE: 146 MMHG | WEIGHT: 250 LBS | HEIGHT: 75 IN | BODY MASS INDEX: 31.08 KG/M2

## 2025-03-12 DIAGNOSIS — I65.23 BILATERAL CAROTID ARTERY STENOSIS: Primary | ICD-10-CM

## 2025-03-12 DIAGNOSIS — I65.23 BILATERAL CAROTID ARTERY STENOSIS: ICD-10-CM

## 2025-03-12 PROCEDURE — 93880 EXTRACRANIAL BILAT STUDY: CPT

## 2025-03-12 NOTE — PROGRESS NOTES
Meet Jiménez (:  1950) is a 74 y.o. male,Established patient, here for evaluation of the following chief complaint(s):  1 Year Follow Up (Carotid)            SUBJECTIVE/OBJECTIVE:  He presents for follow up of carotid artery stenosis.  He has a known history of carotid artery stenosis for 1 - 5 years. His current treatment includes asa and lipitor.He denies a history of CVA.  He reports has not had TIA's, episodes of lateralizing weakness and episodes of amaurosis fugax.    I have personally reviewed the following: problem list, current meds, allergies, PMH, PSH, family hx, and social hx  Meet Jiménez is a 74 y.o. male with the following history as [facility] in [facility]:  Patient Active Problem List    Diagnosis Date Noted    On amiodarone therapy 2023    Secondary hypercoagulable state 2024    Iron deficiency 2024    Middle ear effusion, left 10/11/2023    Asymmetrical hearing loss 2023    Dystrophia unguium 2023    Onychomycosis 2023    Pain of toe of left foot 2023    Pain of toe of right foot 2023    Retinal artery occlusion 10/08/2021    Symptomatic stenosis of right carotid artery 10/08/2021    Atrial fibrillation (HCC) 10/14/2020    Essential hypertension 2018    Personal history of prostate cancer     Mixed hyperlipidemia      Current Outpatient Medications   Medication Sig Dispense Refill    carvedilol (COREG) 6.25 MG tablet TAKE 1 TABLET BY MOUTH TWICE A  tablet 1    ELIQUIS 5 MG TABS tablet TAKE 1 TABLET TWICE A  tablet 3    atorvastatin (LIPITOR) 40 MG tablet Take 1 tablet by mouth daily 90 tablet 3    lisinopril (PRINIVIL;ZESTRIL) 10 MG tablet Take 1 tablet by mouth in the morning and at bedtime 180 tablet 3    amiodarone (CORDARONE) 200 MG tablet TAKE 1 TABLET BY MOUTH EVERY DAY 90 tablet 3    aspirin 81 MG EC tablet Take 1 tablet by mouth daily      ferrous sulfate (IRON 325) 325 (65 Fe) MG tablet Take 1 tablet by

## 2025-03-15 LAB
ECHO BSA: 2.45 M2
VAS LEFT ARM BP DIA: 72 MMHG
VAS LEFT ARM BP: 164 MMHG
VAS LEFT CCA MID EDV: 9.02 CM/S
VAS LEFT CCA MID PSV: 53.3 CM/S
VAS LEFT CCA PROX EDV: 9 CM/S
VAS LEFT CCA PROX PSV: 58.4 CM/S
VAS LEFT ECA PSV: 88.2 CM/S
VAS LEFT ICA DIST EDV: 20.6 CM/S
VAS LEFT ICA DIST PSV: 80.1 CM/S
VAS LEFT ICA MID EDV: 19.7 CM/S
VAS LEFT ICA MID PSV: 69.8 CM/S
VAS LEFT ICA PROX EDV: 12.5 CM/S
VAS LEFT ICA PROX PSV: 63 CM/S
VAS LEFT VERTEBRAL EDV: 10.2 CM/S
VAS LEFT VERTEBRAL PSV: 41.2 CM/S
VAS RIGHT ARM BP DIA: 78 MMHG
VAS RIGHT ARM BP: 164 MMHG
VAS RIGHT CCA MID EDV: 9.81 CM/S
VAS RIGHT CCA MID PSV: 53.6 CM/S
VAS RIGHT CCA PROX EDV: 10.2 CM/S
VAS RIGHT CCA PROX PSV: 49.7 CM/S
VAS RIGHT ECA PSV: 71.3 CM/S
VAS RIGHT ICA DIST EDV: 14.7 CM/S
VAS RIGHT ICA DIST PSV: 56.4 CM/S
VAS RIGHT ICA MID EDV: 13.5 CM/S
VAS RIGHT ICA MID PSV: 47.7 CM/S
VAS RIGHT ICA PROX EDV: 9.4 CM/S
VAS RIGHT ICA PROX PSV: 46.4 CM/S
VAS RIGHT VERTEBRAL EDV: 7.21 CM/S
VAS RIGHT VERTEBRAL PSV: 37.3 CM/S

## 2025-05-19 RX ORDER — AMIODARONE HYDROCHLORIDE 200 MG/1
200 TABLET ORAL DAILY
Qty: 90 TABLET | Refills: 1 | Status: SHIPPED | OUTPATIENT
Start: 2025-05-19

## 2025-06-05 RX ORDER — CARVEDILOL 6.25 MG/1
6.25 TABLET ORAL 2 TIMES DAILY
Qty: 180 TABLET | Refills: 1 | Status: SHIPPED | OUTPATIENT
Start: 2025-06-05

## 2025-06-09 ENCOUNTER — OFFICE VISIT (OUTPATIENT)
Dept: CARDIOLOGY CLINIC | Age: 75
End: 2025-06-09
Payer: MEDICARE

## 2025-06-09 VITALS
HEART RATE: 60 BPM | BODY MASS INDEX: 30.59 KG/M2 | DIASTOLIC BLOOD PRESSURE: 70 MMHG | SYSTOLIC BLOOD PRESSURE: 130 MMHG | HEIGHT: 75 IN | WEIGHT: 246 LBS | OXYGEN SATURATION: 96 %

## 2025-06-09 DIAGNOSIS — I25.10 CORONARY ARTERY DISEASE INVOLVING NATIVE CORONARY ARTERY OF NATIVE HEART WITHOUT ANGINA PECTORIS: ICD-10-CM

## 2025-06-09 DIAGNOSIS — I48.0 PAROXYSMAL ATRIAL FIBRILLATION (HCC): Primary | ICD-10-CM

## 2025-06-09 DIAGNOSIS — I10 ESSENTIAL HYPERTENSION: ICD-10-CM

## 2025-06-09 DIAGNOSIS — Z79.899 ON AMIODARONE THERAPY: ICD-10-CM

## 2025-06-09 DIAGNOSIS — I35.0 NONRHEUMATIC AORTIC VALVE STENOSIS: ICD-10-CM

## 2025-06-09 DIAGNOSIS — I35.8 AORTIC VALVE SCLEROSIS: ICD-10-CM

## 2025-06-09 PROCEDURE — G8417 CALC BMI ABV UP PARAM F/U: HCPCS | Performed by: CLINICAL NURSE SPECIALIST

## 2025-06-09 PROCEDURE — 1036F TOBACCO NON-USER: CPT | Performed by: CLINICAL NURSE SPECIALIST

## 2025-06-09 PROCEDURE — G8427 DOCREV CUR MEDS BY ELIG CLIN: HCPCS | Performed by: CLINICAL NURSE SPECIALIST

## 2025-06-09 PROCEDURE — 1160F RVW MEDS BY RX/DR IN RCRD: CPT | Performed by: CLINICAL NURSE SPECIALIST

## 2025-06-09 PROCEDURE — 3075F SYST BP GE 130 - 139MM HG: CPT | Performed by: CLINICAL NURSE SPECIALIST

## 2025-06-09 PROCEDURE — 1159F MED LIST DOCD IN RCRD: CPT | Performed by: CLINICAL NURSE SPECIALIST

## 2025-06-09 PROCEDURE — 99214 OFFICE O/P EST MOD 30 MIN: CPT | Performed by: CLINICAL NURSE SPECIALIST

## 2025-06-09 PROCEDURE — 3078F DIAST BP <80 MM HG: CPT | Performed by: CLINICAL NURSE SPECIALIST

## 2025-06-09 PROCEDURE — 1123F ACP DISCUSS/DSCN MKR DOCD: CPT | Performed by: CLINICAL NURSE SPECIALIST

## 2025-06-09 PROCEDURE — 93000 ELECTROCARDIOGRAM COMPLETE: CPT | Performed by: CLINICAL NURSE SPECIALIST

## 2025-06-09 NOTE — PROGRESS NOTES
[facility]  [address] [address]  Phone: [phone]  Fax: [phone]    OFFICE VISIT:  2025    Meet Jiménez - : 1950    Reason For Visit:  Meet is a 75 y.o. male who is here for Follow-up and Atrial Fibrillation  1.History of paroxysmal atrial fibrillation with last cardioversion 2020.  Remains on anticoagulation with Eliquis and amiodarone.  Coronary artery disease by catheterization 12/3/2020, single-vessel with calcific mid to distal LAD 60%, asymptomatic, prior EF 35% improving to 60 to 65% (ECHO 3/2021), probable mild aortic stenosis.  3.  Acute right eye vision loss with CRAO 10/2021, right ICA 65% status post right CEA 10/22/2021.    Patient had repeat echo 2023 that showed normal LV size and function.  Aortic sclerosis without any significant stenosis.  Mild AI and mild MR    Follows with vascular surgery for carotid disease    Returns today for routine follow-up.  States overall he is doing well.      Subjective  Meet denies exertional chest pain, shortness of breath, orthopnea, paroxysmal nocturnal dyspnea, syncope, presyncope, arrhythmia, edema and fatigue.  The patient denies numbness or weakness to suggest cerebrovascular accident or transient ischemic attack.    No primary care provider on file. is PCP and follows labs.  Meet Jiménez has the following history as recorded in [facility]:    Patient Active Problem List    Diagnosis Date Noted    On amiodarone therapy 2023    Secondary hypercoagulable state 2024    Iron deficiency 2024    Middle ear effusion, left 10/11/2023    Asymmetrical hearing loss 2023    Dystrophia unguium 2023    Onychomycosis 2023    Pain of toe of left foot 2023    Pain of toe of right foot 2023    Retinal artery occlusion 10/08/2021    Symptomatic stenosis of right carotid artery 10/08/2021    Atrial fibrillation (HCC) 10/14/2020    Essential

## 2025-06-09 NOTE — PATIENT INSTRUCTIONS
Labs with other labs prior to PCP visit next month     Needs PFTs scheduled    Needs 2 D ECHO just prior to Jan apt with DR Maier   Maintain good blood pressure control-goal<130/80 at rest    Maintain good cholesterol control LDL goal<70 with arterial disease  If you are diabetic work to keep/obtain hemoglobin A1c< 7    Follow up in  Jan with Darrion   Call with any questions or concerns  Follow up with No primary care provider on file. for non cardiac problems  Report any new problems  Cardiovascular Fitness-Exercise as tolerated.  Strive for 30 minutes of exercise most days of the week.    Cardiac / Healthy Diet- Avoid processed high fat foods, maintain low sodium/salt   Continue current medications as directed  Continue plan of treatment  It is always recommended that you bring your medications bottles with you to each visit - this is for your safety!

## 2025-06-25 ENCOUNTER — OFFICE VISIT (OUTPATIENT)
Dept: PRIMARY CARE CLINIC | Age: 75
End: 2025-06-25
Payer: MEDICARE

## 2025-06-25 VITALS
OXYGEN SATURATION: 97 % | BODY MASS INDEX: 30.24 KG/M2 | HEART RATE: 57 BPM | SYSTOLIC BLOOD PRESSURE: 148 MMHG | TEMPERATURE: 97.5 F | HEIGHT: 75 IN | WEIGHT: 243.2 LBS | DIASTOLIC BLOOD PRESSURE: 78 MMHG

## 2025-06-25 DIAGNOSIS — E78.2 MIXED HYPERLIPIDEMIA: ICD-10-CM

## 2025-06-25 DIAGNOSIS — Z76.89 ENCOUNTER TO ESTABLISH CARE: Primary | ICD-10-CM

## 2025-06-25 DIAGNOSIS — I48.0 PAROXYSMAL ATRIAL FIBRILLATION (HCC): ICD-10-CM

## 2025-06-25 DIAGNOSIS — Z85.46 PERSONAL HISTORY OF PROSTATE CANCER: ICD-10-CM

## 2025-06-25 DIAGNOSIS — I10 ESSENTIAL HYPERTENSION: ICD-10-CM

## 2025-06-25 PROCEDURE — 99215 OFFICE O/P EST HI 40 MIN: CPT | Performed by: NURSE PRACTITIONER

## 2025-06-25 PROCEDURE — 3078F DIAST BP <80 MM HG: CPT | Performed by: NURSE PRACTITIONER

## 2025-06-25 PROCEDURE — 1123F ACP DISCUSS/DSCN MKR DOCD: CPT | Performed by: NURSE PRACTITIONER

## 2025-06-25 PROCEDURE — G8427 DOCREV CUR MEDS BY ELIG CLIN: HCPCS | Performed by: NURSE PRACTITIONER

## 2025-06-25 PROCEDURE — 1159F MED LIST DOCD IN RCRD: CPT | Performed by: NURSE PRACTITIONER

## 2025-06-25 PROCEDURE — 1036F TOBACCO NON-USER: CPT | Performed by: NURSE PRACTITIONER

## 2025-06-25 PROCEDURE — 3077F SYST BP >= 140 MM HG: CPT | Performed by: NURSE PRACTITIONER

## 2025-06-25 PROCEDURE — G8417 CALC BMI ABV UP PARAM F/U: HCPCS | Performed by: NURSE PRACTITIONER

## 2025-06-25 PROCEDURE — 1160F RVW MEDS BY RX/DR IN RCRD: CPT | Performed by: NURSE PRACTITIONER

## 2025-06-25 RX ORDER — LISINOPRIL 20 MG/1
20 TABLET ORAL 2 TIMES DAILY
Qty: 180 TABLET | Refills: 1 | Status: SHIPPED | OUTPATIENT
Start: 2025-06-25 | End: 2025-09-23

## 2025-06-25 SDOH — ECONOMIC STABILITY: FOOD INSECURITY: WITHIN THE PAST 12 MONTHS, YOU WORRIED THAT YOUR FOOD WOULD RUN OUT BEFORE YOU GOT MONEY TO BUY MORE.: NEVER TRUE

## 2025-06-25 SDOH — ECONOMIC STABILITY: FOOD INSECURITY: WITHIN THE PAST 12 MONTHS, THE FOOD YOU BOUGHT JUST DIDN'T LAST AND YOU DIDN'T HAVE MONEY TO GET MORE.: NEVER TRUE

## 2025-06-25 ASSESSMENT — ENCOUNTER SYMPTOMS
BACK PAIN: 0
VOICE CHANGE: 0
PHOTOPHOBIA: 0
NAUSEA: 0
VOMITING: 0
COUGH: 0
COLOR CHANGE: 0
SHORTNESS OF BREATH: 0
RHINORRHEA: 0

## 2025-06-25 ASSESSMENT — PATIENT HEALTH QUESTIONNAIRE - PHQ9
SUM OF ALL RESPONSES TO PHQ QUESTIONS 1-9: 0
1. LITTLE INTEREST OR PLEASURE IN DOING THINGS: NOT AT ALL
2. FEELING DOWN, DEPRESSED OR HOPELESS: NOT AT ALL

## 2025-06-25 NOTE — PROGRESS NOTES
[facility]  [address]  [address]  [place] [address]  Dept: [phone]  Dept Fax: [phone]  Loc: [phone]    Meet Jiménez is a 75 y.o. male who presents today for his medical conditions/complaints as noted below.  Meet Jiménez is c/o of New Patient        HPI:     History of Present Illness     The patient presents to [facility] care and for evaluation of blood pressure management, atrial fibrillation, carotid artery stenosis, and prostate cancer.    They are under the care of a cardiologist for atrial fibrillation, which is well-managed with amiodarone. They have an upcoming appointment with their cardiologist at the end of next month for an echocardiogram. They are also scheduled for a pulmonary function test on 07/21/2025, as ordered by their cardiologist. They are on Eliquis for atrial fibrillation.    They had a carotid study last year and a follow-up with vascular in March. They underwent a carotid endarterectomy in 2021 by Dr. Trotter. They have not undergone any additional carotid artery surgery.    They have a history of prostate cancer and currently do not have a prostate. They are not seeing anybody for prostate notes or follow-ups.    PAST SURGICAL HISTORY:  - Carotid endarterectomy in 2021 by Dr. Trotter  - Prostatectomy (date not specified)    Past Medical History:   Diagnosis Date    Atrial fibrillation (HCC)     sees [facility] cardiology    CAD (coronary artery disease)     Carotid occlusion, bilateral     has affected vision recently    Erectile dysfunction     Hypertension     Mixed hyperlipidemia     Osteoarthritis     Prostate cancer (HCC)       Past Surgical History:   Procedure Laterality Date    CARDIAC CATHETERIZATION  12/03/2020    EF 35%, moderate diffuse disease in LAD    CARDIOVERSION      CAROTID ENDARTERECTOMY Right 10/22/2021    RIGHT CAROTID ENDARTERECTOMY with bovine pericardial patch. Right cervical carotid

## 2025-06-26 ENCOUNTER — RESULTS FOLLOW-UP (OUTPATIENT)
Dept: PRIMARY CARE CLINIC | Age: 75
End: 2025-06-26

## 2025-06-26 DIAGNOSIS — I10 ESSENTIAL HYPERTENSION: ICD-10-CM

## 2025-06-26 DIAGNOSIS — Z85.46 PERSONAL HISTORY OF PROSTATE CANCER: ICD-10-CM

## 2025-06-26 DIAGNOSIS — Z76.89 ENCOUNTER TO ESTABLISH CARE: ICD-10-CM

## 2025-06-26 DIAGNOSIS — E78.2 MIXED HYPERLIPIDEMIA: ICD-10-CM

## 2025-06-26 DIAGNOSIS — I48.0 PAROXYSMAL ATRIAL FIBRILLATION (HCC): ICD-10-CM

## 2025-06-26 LAB
25(OH)D3 SERPL-MCNC: 25.2 NG/ML
ALBUMIN SERPL-MCNC: 4 G/DL (ref 3.5–5.2)
ALP SERPL-CCNC: 72 U/L (ref 40–129)
ALT SERPL-CCNC: 19 U/L (ref 10–50)
ANION GAP SERPL CALCULATED.3IONS-SCNC: 10 MMOL/L (ref 8–16)
AST SERPL-CCNC: 21 U/L (ref 10–50)
BASOPHILS # BLD: 0 K/UL (ref 0–0.2)
BASOPHILS NFR BLD: 0.8 % (ref 0–1)
BILIRUB SERPL-MCNC: 0.9 MG/DL (ref 0.2–1.2)
BUN SERPL-MCNC: 23 MG/DL (ref 8–23)
CALCIUM SERPL-MCNC: 9.3 MG/DL (ref 8.8–10.2)
CHLORIDE SERPL-SCNC: 106 MMOL/L (ref 98–107)
CHOLEST SERPL-MCNC: 122 MG/DL (ref 0–199)
CO2 SERPL-SCNC: 26 MMOL/L (ref 22–29)
CREAT SERPL-MCNC: 1.1 MG/DL (ref 0.7–1.2)
EOSINOPHIL # BLD: 0 K/UL (ref 0–0.6)
EOSINOPHIL NFR BLD: 0.8 % (ref 0–5)
ERYTHROCYTE [DISTWIDTH] IN BLOOD BY AUTOMATED COUNT: 14.3 % (ref 11.5–14.5)
GLUCOSE SERPL-MCNC: 102 MG/DL (ref 70–99)
HBA1C MFR BLD: 5.6 % (ref 4–5.6)
HCT VFR BLD AUTO: 42.6 % (ref 42–52)
HDLC SERPL-MCNC: 47 MG/DL (ref 40–60)
HGB BLD-MCNC: 13.9 G/DL (ref 14–18)
IMM GRANULOCYTES # BLD: 0 K/UL
LDLC SERPL CALC-MCNC: 58 MG/DL
LYMPHOCYTES # BLD: 1.2 K/UL (ref 1.1–4.5)
LYMPHOCYTES NFR BLD: 22.3 % (ref 20–40)
MCH RBC QN AUTO: 31.2 PG (ref 27–31)
MCHC RBC AUTO-ENTMCNC: 32.6 G/DL (ref 33–37)
MCV RBC AUTO: 95.7 FL (ref 80–94)
MONOCYTES # BLD: 0.5 K/UL (ref 0–0.9)
MONOCYTES NFR BLD: 9.1 % (ref 0–10)
NEUTROPHILS # BLD: 3.5 K/UL (ref 1.5–7.5)
NEUTS SEG NFR BLD: 66.8 % (ref 50–65)
PLATELET # BLD AUTO: 174 K/UL (ref 130–400)
PMV BLD AUTO: 12.5 FL (ref 9.4–12.4)
POTASSIUM SERPL-SCNC: 4 MMOL/L (ref 3.5–5.1)
PROT SERPL-MCNC: 6.7 G/DL (ref 6.4–8.3)
PSA SERPL-MCNC: <0.02 NG/ML (ref 0–4)
RBC # BLD AUTO: 4.45 M/UL (ref 4.7–6.1)
SODIUM SERPL-SCNC: 142 MMOL/L (ref 136–145)
TRIGL SERPL-MCNC: 83 MG/DL (ref 0–149)
TSH SERPL DL<=0.005 MIU/L-ACNC: 1.44 UIU/ML (ref 0.27–4.2)
WBC # BLD AUTO: 5.3 K/UL (ref 4.8–10.8)

## 2025-07-02 RX ORDER — ERGOCALCIFEROL 1.25 MG/1
50000 CAPSULE, LIQUID FILLED ORAL WEEKLY
Qty: 12 CAPSULE | Refills: 0 | Status: SHIPPED | OUTPATIENT
Start: 2025-07-02

## 2025-07-21 ENCOUNTER — HOSPITAL ENCOUNTER (OUTPATIENT)
Dept: PULMONOLOGY | Age: 75
Discharge: HOME | End: 2025-07-21
Payer: MEDICARE

## 2025-07-21 DIAGNOSIS — Z79.899 ON AMIODARONE THERAPY: ICD-10-CM

## 2025-07-21 PROCEDURE — 94729 DIFFUSING CAPACITY: CPT

## 2025-07-21 PROCEDURE — 94060 EVALUATION OF WHEEZING: CPT

## 2025-07-21 PROCEDURE — 94726 PLETHYSMOGRAPHY LUNG VOLUMES: CPT

## 2025-07-21 RX ORDER — ALBUTEROL SULFATE 0.83 MG/ML
2.5 SOLUTION RESPIRATORY (INHALATION) ONCE
Status: DISCONTINUED | OUTPATIENT
Start: 2025-07-21 | End: 2025-07-23 | Stop reason: HOSPADM

## 2025-07-21 NOTE — PROCEDURES
Media Information      Document Information    Other Order: Pulmonary Function Result   Pulmonary Function Result   07/21/2025 10:57   Attached To:   Full PFT Study With Bronchodilator [[id number]]   Hospital Encounter on 7/21/25 with [facility]FT [facility] - [facility]   Source Information    Carito Kinsey, RCP  [facility] Pft   Document History

## 2025-08-05 DIAGNOSIS — I10 ESSENTIAL HYPERTENSION: ICD-10-CM

## 2025-08-05 RX ORDER — LISINOPRIL 20 MG/1
20 TABLET ORAL 2 TIMES DAILY
Qty: 20 TABLET | Refills: 0 | Status: SHIPPED | OUTPATIENT
Start: 2025-08-05

## 2025-08-12 ENCOUNTER — TELEPHONE (OUTPATIENT)
Dept: PRIMARY CARE CLINIC | Age: 75
End: 2025-08-12

## 2025-08-15 DIAGNOSIS — I10 ESSENTIAL HYPERTENSION: ICD-10-CM

## 2025-08-15 RX ORDER — LISINOPRIL 20 MG/1
20 TABLET ORAL 2 TIMES DAILY
Qty: 180 TABLET | Refills: 1 | Status: SHIPPED | OUTPATIENT
Start: 2025-08-15

## 2025-08-15 RX ORDER — LISINOPRIL 20 MG/1
20 TABLET ORAL 2 TIMES DAILY
Qty: 20 TABLET | Refills: 0 | Status: SHIPPED | OUTPATIENT
Start: 2025-08-15 | End: 2025-08-25

## 2025-08-19 DIAGNOSIS — I48.91 ATRIAL FIBRILLATION, UNSPECIFIED TYPE (HCC): ICD-10-CM

## 2025-08-20 DIAGNOSIS — I48.91 ATRIAL FIBRILLATION, UNSPECIFIED TYPE (HCC): ICD-10-CM

## 2025-08-20 RX ORDER — APIXABAN 5 MG/1
5 TABLET, FILM COATED ORAL 2 TIMES DAILY
Qty: 180 TABLET | Refills: 3 | Status: SHIPPED | OUTPATIENT
Start: 2025-08-20

## (undated) DEVICE — SUTURE ABSORBABLE BRAIDED 3-0 12X18 IN COAT UD VICRYL + VCP110G

## (undated) DEVICE — SUTURE VCRL SZ 3-0 L18IN ABSRB UD W/O NDL POLYGLACTIN 910 J110T

## (undated) DEVICE — TBG SMPL FLTR LINE NASL 02/C02 A/ BX/100

## (undated) DEVICE — 3M™ IOBAN™ 2 ANTIMICROBIAL INCISE DRAPE 6650EZ: Brand: IOBAN™ 2

## (undated) DEVICE — SUTURE 3-0 PDS 18 PLUS

## (undated) DEVICE — SUTURE 3-0 L18IN NONABSORBABLE SH BLK NDL BRAID SILK M13T

## (undated) DEVICE — CLIP LIG M BLU TI HRT SHP WIRE HORZ 180 PER BX

## (undated) DEVICE — SOLUTION IV 100ML 0.9% SOD CHL PLAS CONT USP VIAFLX 1 PER

## (undated) DEVICE — SUTURE VCRL SZ 2-0 L18IN ABSRB UD POLYGLACTIN 910 BRAID TIE J111T

## (undated) DEVICE — TOWEL,OR,DSP,ST,BLUE,DLX,4/PK,20PK/CS: Brand: MEDLINE

## (undated) DEVICE — SURGICAL PROCEDURE PACK VASC LOURDES HOSP

## (undated) DEVICE — GLOVE SURG SZ 65 L12IN FNGR THK79MIL GRN LTX FREE

## (undated) DEVICE — SHUNT CV SIL EXT LOOP L30CM DIA3X4MM FULL SPR REINF SUNDT

## (undated) DEVICE — SOLUTION IV 500ML 0.9% SOD CHL PH 5 INJ USP VIAFLX PLAS

## (undated) DEVICE — TUBE ET 8MM NSL ORAL BASIC CUF INTMED MURPHY EYE RADPQ MRK

## (undated) DEVICE — SYRINGE MED 10ML LUERLOCK TIP W/O SFTY DISP

## (undated) DEVICE — SUTURE MCRYL SZ 4-0 L18IN ABSRB UD L19MM PS-2 3/8 CIR PRIM Y496G

## (undated) DEVICE — BLANKET WRM W40.2XL55.9IN IORT LO BODY + MISTRAL AIR

## (undated) DEVICE — C-ARM: Brand: UNBRANDED

## (undated) DEVICE — 3M™ STERI-DRAPE™ INSTRUMENT POUCH 1018: Brand: STERI-DRAPE™

## (undated) DEVICE — ENDOGATOR AUXILIARY WATER JET CONNECTOR: Brand: ENDOGATOR

## (undated) DEVICE — SUTURE NONABSORBABLE MONOFILAMENT 4-0 RB-1 36 IN BLU PROLENE 8557H

## (undated) DEVICE — SUTURE PERMA-HAND SZ 2-0 L30IN NONABSORBABLE BLK L26MM SH K833H

## (undated) DEVICE — SYRINGE MED 30ML STD CLR PLAS LUERLOCK TIP N CTRL DISP

## (undated) DEVICE — GLOVE SURG SZ 7 L12IN FNGR THK94MIL TRNSLUC YEL LTX HYDRGEL

## (undated) DEVICE — STYLET INTUB 14FR MAL ALUM CVR PLAS SHTH EXT LUBRICATED

## (undated) DEVICE — NEEDLE HYPO 18GA L1.5IN PNK POLYPR HUB S STL REG BVL STR

## (undated) DEVICE — DRAPE: INST MAG 16X20 20/CS: Brand: MEDICAL ACTION INDUSTRIES

## (undated) DEVICE — TOTAL TRAY, 16FR 10ML SIL FOLEY, URN: Brand: MEDLINE

## (undated) DEVICE — SUTURE PROL SZ 6-0 L30IN NONABSORBABLE BLU L9.3MM BV-1 3/8 M8709

## (undated) DEVICE — DRESSING FOAM W10XL10CM ABSRB ANTIMIC SAFETAC TECHNOLOGY

## (undated) DEVICE — SOLUTION INJ VISIPAQUE 50ML

## (undated) DEVICE — SET EXTN TBNG IV STD BOR 30IN NO STPCOCK

## (undated) DEVICE — SUTURE PROL SZ 7-0 L24IN NONABSORBABLE BLU L9.3MM BV-1 3/8 M8702

## (undated) DEVICE — Device: Brand: DEFENDO AIR/WATER/SUCTION AND BIOPSY VALVE

## (undated) DEVICE — SUTURE PERMAHAND SZ 2-0 L18IN NONABSORBABLE BLK L26MM FS 685G

## (undated) DEVICE — Device: Brand: JELCO

## (undated) DEVICE — BLADE LARYNSCP SZ 4 TI DISP SPECTRM LOPRO GLIDESCOPE

## (undated) DEVICE — YANKAUER,BULB TIP WITH VENT: Brand: ARGYLE

## (undated) DEVICE — SOLUTION IV IRRIG POUR BRL 0.9% SODIUM CHL 2F7124

## (undated) DEVICE — MEDIA CONTRAST INJ VISAPAQUE 50ML 320MG

## (undated) DEVICE — DRAIN WND SIL FLAT RADIOPAQUE 10MM FULL FLUTED

## (undated) DEVICE — JACKSON-PRATT 100CC BULB RESERVOIR: Brand: CARDINAL HEALTH

## (undated) DEVICE — SUTURE VCRL SZ 4-0 L18IN ABSRB UD VCRL POLYGLACTIN 910 COAT J109T

## (undated) DEVICE — THE CHANNEL CLEANING BRUSH IS A NYLON FLEXI BRUSH ATTACHED TO A FLEXIBLE PLASTIC SHEATH DESIGNED TO SAFELY REMOVE DEBRIS FROM FLEXIBLE ENDOSCOPES.

## (undated) DEVICE — SUTURE ABSORBABLE MONOFILAMENT 3-0 SH 27 IN UD PDS + PDP416H

## (undated) DEVICE — STOPCOCK IV PRIMING 0.26ML 3 W W/ TWO FEM LUERLOK PRT 1

## (undated) DEVICE — ADHESIVE SKIN CLSR 0.7ML TOP DERMBND ADV

## (undated) DEVICE — SENSR O2 OXIMAX FNGR A/ 18IN NONSTR

## (undated) DEVICE — SUTURE ABSORBABLE MONOFILAMENT 4-0 PS1 12 IN UD MONOCRYL + SXMP1B114

## (undated) DEVICE — GLOVE SURG SZ 7 L12IN FNGR THK79MIL GRN LTX FREE

## (undated) DEVICE — MASK,OXYGEN,MED CONC,ADLT,7' TUB, UC: Brand: PENDING